# Patient Record
Sex: FEMALE | Race: BLACK OR AFRICAN AMERICAN
[De-identification: names, ages, dates, MRNs, and addresses within clinical notes are randomized per-mention and may not be internally consistent; named-entity substitution may affect disease eponyms.]

---

## 2014-12-24 VITALS — DIASTOLIC BLOOD PRESSURE: 91 MMHG | SYSTOLIC BLOOD PRESSURE: 185 MMHG

## 2017-04-14 ENCOUNTER — HOSPITAL ENCOUNTER (OUTPATIENT)
Dept: HOSPITAL 61 - PNCL | Age: 72
Discharge: HOME | End: 2017-04-14
Attending: ANESTHESIOLOGY
Payer: MEDICARE

## 2017-04-14 DIAGNOSIS — I25.10: ICD-10-CM

## 2017-04-14 DIAGNOSIS — M50.123: Primary | ICD-10-CM

## 2017-04-14 DIAGNOSIS — I10: ICD-10-CM

## 2017-04-14 DIAGNOSIS — M19.90: ICD-10-CM

## 2017-04-14 DIAGNOSIS — M96.1: ICD-10-CM

## 2017-04-14 PROCEDURE — 62321 NJX INTERLAMINAR CRV/THRC: CPT
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
## 2017-04-20 ENCOUNTER — HOSPITAL ENCOUNTER (EMERGENCY)
Dept: HOSPITAL 61 - ER | Age: 72
Discharge: HOME | End: 2017-04-20
Payer: MEDICARE

## 2017-04-20 VITALS — DIASTOLIC BLOOD PRESSURE: 90 MMHG | SYSTOLIC BLOOD PRESSURE: 145 MMHG

## 2017-04-20 DIAGNOSIS — R10.31: ICD-10-CM

## 2017-04-20 DIAGNOSIS — I10: ICD-10-CM

## 2017-04-20 DIAGNOSIS — G89.29: ICD-10-CM

## 2017-04-20 DIAGNOSIS — Z88.6: ICD-10-CM

## 2017-04-20 DIAGNOSIS — R10.9: Primary | ICD-10-CM

## 2017-04-20 DIAGNOSIS — Z88.5: ICD-10-CM

## 2017-04-20 DIAGNOSIS — Z88.8: ICD-10-CM

## 2017-04-20 DIAGNOSIS — Z90.49: ICD-10-CM

## 2017-04-20 LAB
ANION GAP SERPL CALC-SCNC: 13 MMOL/L (ref 6–14)
ANISOCYTOSIS BLD QL SMEAR: (no result)
BACTERIA #/AREA URNS HPF: 0 /HPF
BASOPHILS # BLD AUTO: 0.1 X10^3/UL (ref 0–0.2)
BASOPHILS NFR BLD: 1 % (ref 0–3)
BILIRUB UR QL STRIP: NEGATIVE
BUN SERPL-MCNC: 14 MG/DL (ref 7–20)
CALCIUM SERPL-MCNC: 9.2 MG/DL (ref 8.5–10.1)
CHLORIDE SERPL-SCNC: 104 MMOL/L (ref 98–107)
CO2 SERPL-SCNC: 26 MMOL/L (ref 21–32)
CREAT SERPL-MCNC: 1.2 MG/DL (ref 0.6–1)
EOSINOPHIL NFR BLD: 1 % (ref 0–3)
ERYTHROCYTE [DISTWIDTH] IN BLOOD BY AUTOMATED COUNT: 27.1 % (ref 11.5–14.5)
GFR SERPLBLD BASED ON 1.73 SQ M-ARVRAT: 53.9 ML/MIN
GLUCOSE SERPL-MCNC: 104 MG/DL (ref 70–99)
GLUCOSE UR STRIP-MCNC: NEGATIVE MG/DL
HCT VFR BLD CALC: 41.7 % (ref 36–47)
HGB BLD-MCNC: 13.2 G/DL (ref 12–15.5)
LYMPHOCYTES # BLD: 2.4 X10^3/UL (ref 1–4.8)
LYMPHOCYTES NFR BLD AUTO: 28 % (ref 24–48)
MCH RBC QN AUTO: 26 PG (ref 25–35)
MCHC RBC AUTO-ENTMCNC: 32 G/DL (ref 31–37)
MCV RBC AUTO: 83 FL (ref 79–100)
MONOCYTES NFR BLD: 9 % (ref 0–9)
NEUTROPHILS NFR BLD AUTO: 61 % (ref 31–73)
NITRITE UR QL STRIP: NEGATIVE
OVALOCYTES BLD QL SMEAR: (no result)
PH UR STRIP: 6 [PH]
PLATELET # BLD AUTO: 396 X10^3/UL (ref 140–400)
PLATELET # BLD EST: ADEQUATE 10*3/UL
POIKILOCYTOSIS BLD QL SMEAR: SLIGHT
POTASSIUM SERPL-SCNC: 4.1 MMOL/L (ref 3.5–5.1)
PROT UR STRIP-MCNC: NEGATIVE MG/DL
RBC # BLD AUTO: 5.05 X10^6/UL (ref 3.5–5.4)
RBC #/AREA URNS HPF: (no result) /HPF (ref 0–2)
SODIUM SERPL-SCNC: 143 MMOL/L (ref 136–145)
SP GR UR STRIP: 1.01
SQUAMOUS #/AREA URNS LPF: (no result) /LPF
TARGETS BLD QL SMEAR: PRESENT
UROBILINOGEN UR-MCNC: 0.2 MG/DL
WBC # BLD AUTO: 8.6 X10^3/UL (ref 4–11)
WBC #/AREA URNS HPF: 0 /HPF (ref 0–4)

## 2017-04-20 PROCEDURE — 93005 ELECTROCARDIOGRAM TRACING: CPT

## 2017-04-20 PROCEDURE — 80048 BASIC METABOLIC PNL TOTAL CA: CPT

## 2017-04-20 PROCEDURE — 85027 COMPLETE CBC AUTOMATED: CPT

## 2017-04-20 PROCEDURE — 96374 THER/PROPH/DIAG INJ IV PUSH: CPT

## 2017-04-20 PROCEDURE — 36415 COLL VENOUS BLD VENIPUNCTURE: CPT

## 2017-04-20 PROCEDURE — 85007 BL SMEAR W/DIFF WBC COUNT: CPT

## 2017-04-20 PROCEDURE — 99285 EMERGENCY DEPT VISIT HI MDM: CPT

## 2017-04-20 PROCEDURE — 81001 URINALYSIS AUTO W/SCOPE: CPT

## 2017-04-20 PROCEDURE — 84484 ASSAY OF TROPONIN QUANT: CPT

## 2017-04-20 PROCEDURE — 74176 CT ABD & PELVIS W/O CONTRAST: CPT

## 2017-04-20 NOTE — PHYS DOC
Past Medical History


Past Medical History:  Hypertension, Other


Additional Past Medical Histor:  chronic lower back pain


Past Surgical History:  Cholecystectomy, Other


Additional Past Surgical Histo:  right mastectomy; cervical neck; left 

bunionectomy,CARDIAC STENT


Alcohol Use:  None


Drug Use:  None





Adult General


Chief Complaint


Chief Complaint:  FLANK PAIN





[facility]


HPI


This is a 71-year-old female who is presenting with some right-sided abdominal 

pain that does radiate somewhat into her flank area as well. She denies any 

dysuria or hematuria. She states it developed rather suddenly several hours 

ago. She's never had symptoms like this before. 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 

laparatomy and still has her appendix and gallbladder. She denies any history 

of stones. She has one cardiac stent and hypertension. She rates her pain a 7/

10 on the pain scale. She denies any fever or chills. She denies any nausea or 

vomiting. She denies any chest pain or shortness of breath. States she's had 

normal bowel movements.





Review of Systems


Review of Systems





Constitutional: Denies fever or chills []


Eyes: Denies change in visual acuity, redness, or eye pain []


HENT: Denies nasal congestion or sore throat []


Respiratory: Denies cough or shortness of breath []


Cardiovascular: No additional information not addressed in HPI []


GI: Has abdominal pain, denies nausea, denies vomiting, denies bloody stools or 

diarrhea []


: Denies dysuria or hematuria []


Musculoskeletal: Denies back pain or joint pain []


Integument: Denies rash or skin lesions []


Neurologic: Denies headache, focal weakness or sensory changes []


Endocrine: Denies polyuria or polydipsia []





Current Medications


Current Medications








 Current Medications








 Medications


  (Trade)  Dose


 Ordered  [facility]  Start Time


 Stop Time Status Last Admin


Dose Admin


 


 Fentanyl Citrate


  (Fentanyl 2ml


 Vial)  50 mcg  1X  ONCE  4/20/17 21:45


 4/20/17 21:46 DC 4/20/17 22:02


50 MCG














Allergies


Allergies





 Allergies








Coded Allergies Type Severity Reaction Last Updated Verified


 


  aspirin Allergy Unknown  5/5/14 Yes


 


  codeine Allergy Unknown  5/5/14 Yes


 


  diazepam Allergy Unknown  5/5/14 Yes


 


  hydrocodone Allergy Unknown  5/5/14 Yes


 


  ibuprofen Allergy Unknown  5/5/14 Yes


 


  morphine Allergy Unknown  5/5/14 Yes


 


  promethazine Allergy Unknown  5/5/14 Yes


 


Uncoded Allergies Type Severity Reaction Last Updated Verified


 


  IM SHOTS Allergy Unknown  5/5/14 











Physical Exam


Physical Exam





Constitutional: Well developed, well nourished, no acute distress, non-toxic 

appearance. []


HENT: Normocephalic, atraumatic, bilateral external ears normal, oropharynx 

moist, no oral exudates, nose normal. []


Eyes: PERRLA, EOMI, conjunctiva normal, no discharge. [] 


Neck: Normal range of motion, no tenderness, supple, no stridor. [] 


Cardiovascular:Heart rate regular rhythm, no murmur []


Lungs & Thorax:  Bilateral breath sounds clear to auscultation []


Abdomen: Bowel sounds normal, soft, mild RLQ tenderness, no masses, no 

pulsatile masses. [] 


Skin: Warm, dry, no erythema, no rash. [] 


Back: No tenderness, right CVA tenderness. [] 


Extremities: No tenderness, no cyanosis, no clubbing, ROM intact, no edema. [] 


Neurologic: Alert and oriented X 3, normal motor function, normal sensory 

function, no focal deficits noted. []


Psychologic: Affect normal, judgement normal, mood normal. []





Current Patient Data


Vital Signs





 Vital Signs








  Date Time  Temp Pulse Resp B/P Pulse Ox O2 Delivery O2 Flow Rate FiO2


 


4/20/17 22:02   16   Nasal Cannula  


 


4/20/17 20:58 97.7 101  199/94 97   





 97.7       








Lab Values





 Laboratory Tests








Test


  4/20/17


21:18


 


White Blood Count


  8.6x10^3/uL


(4.0-11.0)


 


Red Blood Count


  5.05x10^6/uL


(3.50-5.40)


 


Hemoglobin


  13.2g/dL


(12.0-15.5)


 


Hematocrit


  41.7%


(36.0-47.0)


 


Mean Corpuscular Volume 83fL ()  


 


Mean Corpuscular Hemoglobin 26pg (25-35)  


 


Mean Corpuscular Hemoglobin


Concent 32g/dL (31-37)


 


 


Red Cell Distribution Width


  27.1%


(11.5-14.5)  H


 


Platelet Count


  396x10^3/uL


(140-400)


 


Neutrophils (%) (Auto) 61% (31-73)  


 


Lymphocytes (%) (Auto) 28% (24-48)  


 


Monocytes (%) (Auto) 9% (0-9)  


 


Eosinophils (%) (Auto) 1% (0-3)  


 


Basophils (%) (Auto) 1% (0-3)  


 


Neutrophils # (Auto)


  5.3x10^3uL


(1.8-7.7)


 


Lymphocytes # (Auto)


  2.4x10^3/uL


(1.0-4.8)


 


Monocytes # (Auto)


  0.8x10^3/uL


(0.0-1.1)


 


Eosinophils # (Auto)


  0.1x10^3/uL


(0.0-0.7)


 


Basophils # (Auto)


  0.1x10^3/uL


(0.0-0.2)


 


Platelet Estimate Pending  


 


Urine Collection Type Unknown  


 


Urine Color Yellow  


 


Urine Clarity Clear  


 


Urine pH 6.0  


 


Urine Specific Gravity 1.015  


 


Urine Protein


  Negativemg/dL


(NEG-TRACE)


 


Urine Glucose (UA)


  Negativemg/dL


(NEG)


 


Urine Ketones (Stick)


  Negativemg/dL


(NEG)


 


Urine Blood


  Negative (NEG)


 


 


Urine Nitrite


  Negative (NEG)


 


 


Urine Bilirubin


  Negative (NEG)


 


 


Urine Urobilinogen Dipstick


  0.2mg/dL (0.2


mg/dL)


 


Urine Leukocyte Esterase


  Negative (NEG)


 


 


Urine RBC Occ/HPF (0-2)  


 


Urine WBC 0/HPF (0-4)  


 


Urine Squamous Epithelial


Cells Occ/LPF  


 


 


Urine Bacteria 0/HPF (0-FEW)  


 


Urine Mucus Slight/LPF  


 


Sodium Level


  143mmol/L


(136-145)


 


Potassium Level


  4.1mmol/L


(3.5-5.1)


 


Chloride Level


  104mmol/L


()


 


Carbon Dioxide Level


  26mmol/L


(21-32)


 


Anion Gap 13 (6-14)  


 


Blood Urea Nitrogen


  14mg/dL (7-20)


 


 


Creatinine


  1.2mg/dL


(0.6-1.0)  H


 


Estimated GFR


(Cockcroft-Gault) 53.9  


 


 


Glucose Level


  104mg/dL


(70-99)  H


 


Calcium Level


  9.2mg/dL


(8.5-10.1)


 


Troponin I Quantitative


  < 0.017ng/mL


(0.000-0.055)





 Laboratory Tests


4/20/17 21:18








 Laboratory Tests


4/20/17 21:18














EKG


EKG


EKG as interpreted by me shows a sinus rhythm with a rate of 96 bpm. There are 

some lateral ST findings in V5 and V6 that are borderline but there is no STEMI 

criteria on this EKG. Intervals are normal.





Radiology/Procedures


Radiology/Procedures


IMPRESSION 


1. 2 mm left renal stone and possible punctate bilateral renal stones. 


There is no evidence of obstructive uropathy. 


2. 2.0 cm lesion within the right kidney, the stability of which compared 


to the prior studies favors a benign cyst. 


3. 2 mm and 3 mm right middle and lower lobe nodules, the larger of which 


is new compared to the prior study. Followup can be performed according to


Fleischner society criteria. 


4. Chronic mild compression deformities of T11 and L2, possibly pathologic


given lytic and sclerotic changes in these vertebral segments. There is 


also mixed lytic and sclerotic change within the sacrum. The long-term 


stability favors in etiology such as Paget's disease rather than 


metastatic disease. Correlate with clinical history.





Course & Med Decision Making


Course & Med Decision Making


Pertinent Labs and Imaging studies reviewed. (See chart for details)





This 71-year-old female has some right-sided lateral abdominal pain and right-

sided flank pain and will have full laboratory workup and a CT of her abdomen/

pelvis. At this time her CBC is unremarkable. Her urine does not demonstrate 

any signs of infection or blood. She does not appear to be in any acute 

distress.





My reassessment, the patient feels improved after pain medications. Her 

laboratory workup is unremarkable. There are no signs of infection. Her CT 

demonstrates some chronic findings in her T11 and L2 vertebrae that are likely 

lytic and sclerotic changes that are chronic. She also has some right middle 

and right lower lobe nodules which are new compared to a prior study. I 

indicated these findings to the patient and she will follow up with her primary 

care doctor. I see no indication at this time to admit the patient a hospital 

in light of her nonacute findings and normal laboratory workup. She will follow 

up closely with Dr. Harkins in the next 2-3 days and I will provide her pain 

medication until that time.





Dragon Disclaimer


Dragon Disclaimer


This electronic medical record was generated, in whole or in part, using a 

voice recognition dictation system.





Departure


Departure


Impression:  


 Primary Impression:  


 Right flank pain


Disposition:  01 HOME, SELF-CARE


Admitting Physician:  Other


Condition:  STABLE


Referrals:  


MIN HARKINS MD (PCP)


Patient Instructions:  Flank Pain, Easy-to-Read





Additional Instructions:


Please take your pain medication as prescribed. Return to the ER if you develop 

any worsening of your symptoms. Follow up with Dr. Harkins in the next 2-3 days 

for your lung findings and your flank pain.


Scripts


Oxycodone/Apap 5-325 (Percocet 5-325 Mg Tablet)1 Each Tablet1 Tab PO PRN Q6HRS 

PRN PAIN #14 TAB  Ref 0


   Prov:RAFAEL FUENTES DO         4/20/17








RAFAEL FUENTES DO Apr 20, 2017 21:46

## 2017-04-20 NOTE — RAD
PROCEDURE 

Abdomen and pelvis CT without intravenous contrast. 

 

HISTORY 

Right flank pain. 

 

TECHNIQUE 

Computed tomographic images the and pelvis were obtained without contrast.

One or more of the following individualized dose reduction techniques were

utilized for this examination: 1. Automated exposure control; 2. 

Adjustment of the mA and/or kV according to patient size; 3. Use of 

iterative reconstruction technique. 

 

COMPARISON 

None. 

 

FINDINGS 

Evaluation of the lower thorax demonstrates a 2 mm nodule within the right

middle lobe and 3 mm nodule within the right lower lobe. There is lower 

lobe atelectasis or scarring. There is a moderate hiatal hernia. There is 

coronary artery atherosclerosis. No hepatic lesion is seen. The 

gallbladder is surgically absent. The pancreas, spleen and adrenal glands 

are unremarkable. There is a 1-2 mm nonobstructing stone within the lower 

midzone of the left kidney. There may be punctate stones within the upper 

poles of both kidneys. There is no evidence of obstructive uropathy. There

is a 2.0 cm hypodense lesion within the anterior lower midzone of the 

right kidney, likely a cyst. No abnormally thickened or dilated loop of 

bowel is seen. There is no pathologically enlarged lymph node. The uterus 

and ovaries are unremarkable. There are chronic appearing mild compression

fractures of T11 and L2, both of which appear to be pathologic fractures 

associated with mixed lytic and sclerotic lesions. There are also mixed 

lytic and sclerotic changes involving the sacrum. 

 

IMPRESSION 

1. 2 mm left renal stone and possible punctate bilateral renal stones. 

There is no evidence of obstructive uropathy. 

2. 2.0 cm lesion within the right kidney, the stability of which compared 

to the prior studies favors a benign cyst. 

3. 2 mm and 3 mm right middle and lower lobe nodules, the larger of which 

is new compared to the prior study. Followup can be performed according to

Fleischner society criteria. 

4. Chronic mild compression deformities of T11 and L2, possibly pathologic

given lytic and sclerotic changes in these vertebral segments. There is 

also mixed lytic and sclerotic change within the sacrum. The long-term 

stability favors in etiology such as Paget's disease rather than 

metastatic disease. Correlate with clinical history. 

 

Electronically signed by: Jazmín Buck (Apr 20, 2017 22:01:27)

## 2017-04-21 NOTE — EKG
[facility]

               [address] [place] [address]

Test Date:    2017               Test Time:    21:14:34

Pat Name:     BRANDON GUERRERO            Department:   

Patient ID:   [id number]           Room:          

Gender:       F                        Technician:   

:          [id number]               Requested By: RAFAEL FUENTES

Order Number: [id number]            Reading MD:   Yuliet White

                                 Measurements

Intervals                              Axis          

Rate:         96                       P:            33

TX:           160                      QRS:          21

QRSD:         82                       T:            95

QT:           330                                    

QTc:          418                                    

                           Interpretive Statements

SINUS RHYTHM

LEFT ATRIAL ABNORMALITY

LVH WITH REPOLARIZATION ABNORMALITY

ABNORMAL ECG



Electronically Signed On 2017 17:12:03 CDT by Yuliet White

## 2017-04-24 ENCOUNTER — HOSPITAL ENCOUNTER (INPATIENT)
Dept: HOSPITAL 61 - ER | Age: 72
LOS: 2 days | Discharge: HOME | DRG: 552 | End: 2017-04-26
Attending: FAMILY MEDICINE | Admitting: FAMILY MEDICINE
Payer: MEDICARE

## 2017-04-24 VITALS — BODY MASS INDEX: 30.66 KG/M2 | WEIGHT: 184 LBS | HEIGHT: 65 IN

## 2017-04-24 VITALS — DIASTOLIC BLOOD PRESSURE: 95 MMHG | SYSTOLIC BLOOD PRESSURE: 172 MMHG

## 2017-04-24 DIAGNOSIS — Z95.5: ICD-10-CM

## 2017-04-24 DIAGNOSIS — Z85.3: ICD-10-CM

## 2017-04-24 DIAGNOSIS — N20.0: ICD-10-CM

## 2017-04-24 DIAGNOSIS — M47.26: ICD-10-CM

## 2017-04-24 DIAGNOSIS — M51.16: ICD-10-CM

## 2017-04-24 DIAGNOSIS — M77.9: ICD-10-CM

## 2017-04-24 DIAGNOSIS — M54.9: ICD-10-CM

## 2017-04-24 DIAGNOSIS — Z88.8: ICD-10-CM

## 2017-04-24 DIAGNOSIS — G89.29: ICD-10-CM

## 2017-04-24 DIAGNOSIS — Z88.5: ICD-10-CM

## 2017-04-24 DIAGNOSIS — M88.9: ICD-10-CM

## 2017-04-24 DIAGNOSIS — Z90.11: ICD-10-CM

## 2017-04-24 DIAGNOSIS — Z90.49: ICD-10-CM

## 2017-04-24 DIAGNOSIS — M48.06: ICD-10-CM

## 2017-04-24 DIAGNOSIS — Z88.6: ICD-10-CM

## 2017-04-24 DIAGNOSIS — M48.02: ICD-10-CM

## 2017-04-24 DIAGNOSIS — M43.16: Primary | ICD-10-CM

## 2017-04-24 DIAGNOSIS — I25.10: ICD-10-CM

## 2017-04-24 DIAGNOSIS — M17.0: ICD-10-CM

## 2017-04-24 DIAGNOSIS — I10: ICD-10-CM

## 2017-04-24 LAB
ALBUMIN SERPL-MCNC: 4.3 G/DL (ref 3.4–5)
ALBUMIN/GLOB SERPL: 1.2 {RATIO} (ref 1–1.7)
ALP SERPL-CCNC: 343 U/L (ref 46–116)
ALT SERPL-CCNC: 21 U/L (ref 14–59)
ANION GAP SERPL CALC-SCNC: 12 MMOL/L (ref 6–14)
ANISOCYTOSIS BLD QL SMEAR: (no result)
AST SERPL-CCNC: 15 U/L (ref 15–37)
BASOPHILS # BLD AUTO: 0 X10^3/UL (ref 0–0.2)
BASOPHILS NFR BLD: 1 % (ref 0–3)
BILIRUB SERPL-MCNC: 0.3 MG/DL (ref 0.2–1)
BUN SERPL-MCNC: 23 MG/DL (ref 7–20)
BUN/CREAT SERPL: 23 (ref 6–20)
CALCIUM SERPL-MCNC: 9.6 MG/DL (ref 8.5–10.1)
CHLORIDE SERPL-SCNC: 102 MMOL/L (ref 98–107)
CO2 SERPL-SCNC: 25 MMOL/L (ref 21–32)
CREAT SERPL-MCNC: 1 MG/DL (ref 0.6–1)
EOSINOPHIL NFR BLD: 1 % (ref 0–3)
ERYTHROCYTE [DISTWIDTH] IN BLOOD BY AUTOMATED COUNT: 26.3 % (ref 11.5–14.5)
GFR SERPLBLD BASED ON 1.73 SQ M-ARVRAT: 66.5 ML/MIN
GLOBULIN SER-MCNC: 3.7 G/DL (ref 2.2–3.8)
GLUCOSE SERPL-MCNC: 115 MG/DL (ref 70–99)
HCT VFR BLD CALC: 40.5 % (ref 36–47)
HGB BLD-MCNC: 13.1 G/DL (ref 12–15.5)
LYMPHOCYTES # BLD: 2.1 X10^3/UL (ref 1–4.8)
LYMPHOCYTES NFR BLD AUTO: 24 % (ref 24–48)
MCH RBC QN AUTO: 27 PG (ref 25–35)
MCHC RBC AUTO-ENTMCNC: 32 G/DL (ref 31–37)
MCV RBC AUTO: 82 FL (ref 79–100)
MONOCYTES NFR BLD: 6 % (ref 0–9)
NEUTROPHILS NFR BLD AUTO: 69 % (ref 31–73)
PLATELET # BLD AUTO: 367 X10^3/UL (ref 140–400)
PLATELET # BLD EST: ADEQUATE 10*3/UL
POIKILOCYTOSIS BLD QL SMEAR: SLIGHT
POTASSIUM SERPL-SCNC: 3.9 MMOL/L (ref 3.5–5.1)
PROT SERPL-MCNC: 8 G/DL (ref 6.4–8.2)
RBC # BLD AUTO: 4.93 X10^6/UL (ref 3.5–5.4)
SODIUM SERPL-SCNC: 139 MMOL/L (ref 136–145)
WBC # BLD AUTO: 8.7 X10^3/UL (ref 4–11)

## 2017-04-24 PROCEDURE — 85007 BL SMEAR W/DIFF WBC COUNT: CPT

## 2017-04-24 PROCEDURE — 36415 COLL VENOUS BLD VENIPUNCTURE: CPT

## 2017-04-24 PROCEDURE — 72141 MRI NECK SPINE W/O DYE: CPT

## 2017-04-24 PROCEDURE — 80053 COMPREHEN METABOLIC PANEL: CPT

## 2017-04-24 PROCEDURE — 96372 THER/PROPH/DIAG INJ SC/IM: CPT

## 2017-04-24 PROCEDURE — 85027 COMPLETE CBC AUTOMATED: CPT

## 2017-04-24 PROCEDURE — 80048 BASIC METABOLIC PNL TOTAL CA: CPT

## 2017-04-24 PROCEDURE — 72148 MRI LUMBAR SPINE W/O DYE: CPT

## 2017-04-24 PROCEDURE — 62323 NJX INTERLAMINAR LMBR/SAC: CPT

## 2017-04-24 PROCEDURE — 73723 MRI JOINT LWR EXTR W/O&W/DYE: CPT

## 2017-04-24 PROCEDURE — A9585 GADOBUTROL INJECTION: HCPCS

## 2017-04-24 RX ADMIN — HYDROMORPHONE HYDROCHLORIDE PRN MG: 2 INJECTION INTRAMUSCULAR; INTRAVENOUS; SUBCUTANEOUS at 22:38

## 2017-04-24 NOTE — ACF
Admission Forms Criteria


                                                                               

   


                             BACK PAIN





Clinical Indications for Admission to Inpatient Care





                                                            (Place 'X' for any 

and all applicable criteria):





Admission is indicated for ANY ONE of the following (1)(2)(3)(4)(5)(6):


[X]I.    Inpatient admission required rather than observation care (Also use 

Back Pain: Observation Care as appropriate) because 


         of ANY ONE of the following


         [ ]a)       Severe pain requiring acute inpatient management


         [ ]b)       Immediate inpatient surgery


         [X]c)      Other condition, treatment or monitoring requiring 

inpatient admission


[ ]II.    Spine fracture with significant damage or threat of damage to 

vertebral column or spinal cord 


[ ]III.   Progressive or severe neurologic deficit


[ ]IV.  Suspected spinal infection (e.g., epidural abscess, vertebral 

osteomyelitis)(10)


[ ]V.   Suspected cause requires inpatient treatment (eg, aortic dissection)


[ ]VI.  Cauda equina syndrome as indicated by ANY ONE of the following (9):


         [ ]a)    Bowel dysfunction                 


         [ ]b)    Bladder dysfunction 


         [ ]c)    Saddle anesthesia


         [ ]d)    Neurologic abnormality suggesting cauda equina impingement











Extended stay beyond goal length of stay may be needed for (3)(25):           


[ ]a)   Spinal cord compression from stenosis, disk, or tumor (8)(9) 


[ ]b)   Traumatic or pathologic vertebral fracture (33)


[ ]c)   Vertebral infection(10)


[ ]d)   Severe pain that is difficult to control   


[ ]e)   Older patients(65 years or older)











The original [employer] content created by [employer] has been revised. 


The portions of the content which have been revised are identified through the 

use of italic text or in bold, and [facility] 


has neither reviewed nor approved the modified material. All other unmodified 

content is copyright  [employer].





Please see references footnoted in the original [email or web address] edition 

2016





Admission Criteria Met?:  Yes








DEBBIE SPRING Apr 24, 2017 22:00

## 2017-04-24 NOTE — PHYS DOC
Past Medical History


Past Medical History:  Hypertension, Other


Additional Past Medical Histor:  chronic lower back pain


Past Surgical History:  Cholecystectomy, Other


Additional Past Surgical Histo:  right mastectomy; cervical neck; left 

bunionectomy,CARDIAC STENT


Alcohol Use:  None


Drug Use:  None





Adult General


Chief Complaint


Chief Complaint:  HIP PAIN





HPI


HPI


Patient is a 71  year old female presents emergency department stating that she 

has having right lower back pain down into her right hip. Patient states that 

she was seen here a few days ago for the same pain and discomfort. Plenty 

looking into the chart she was seen here for right flank pain with a kidney 

stone noted. She was discharged that time with Percocet. Patient states that 

she has been taken Flexeril and Percocet for the pain and discomfort with no 

relief. Patient denies any numbness or tingling down to the lower extremity. 

She states that she has called her primary care physician Friday and again 

today without any return call. She denies any new injury. She denies any loss 

of bowel or bladder. Patient at this time does not have any complaints of cough 

and shortness of breath or difficulty breathing.





Review of Systems


Review of Systems





Constitutional: Denies fever or chills []


Eyes: Denies change in visual acuity, redness, or eye pain []


HENT: Denies nasal congestion or sore throat []


Respiratory: Denies cough or shortness of breath []


Cardiovascular: No additional information not addressed in HPI []


GI: Denies abdominal pain, nausea, vomiting, bloody stools or diarrhea []


: Denies dysuria or hematuria []


Musculoskeletal: chronic low back pain denies joint pain []


Integument: Denies rash or skin lesions []


Neurologic: Denies headache, focal weakness or sensory changes []





Current Medications


Current Medications








 Current Medications








 Medications


  (Trade)  Dose


 Ordered  Sig/Mayte  Start Time


 Stop Time Status Last Admin


Dose Admin


 


 Fentanyl Citrate


  (Fentanyl 2ml


 Vial)  50 mcg  1X  ONCE  4/24/17 20:15


 4/24/17 20:16 DC 4/24/17 20:31


50 MCG


 


 Methylprednisolone


 Sodium Succinate


  (Solu-Medrol


 125mg Vial)  125 mg  1X  ONCE  4/24/17 20:15


 4/24/17 20:16 DC 4/24/17 20:31


125 MG














Allergies


Allergies





 Allergies








Coded Allergies Type Severity Reaction Last Updated Verified


 


  aspirin Allergy Unknown  5/5/14 Yes


 


  codeine Allergy Unknown  5/5/14 Yes


 


  diazepam Allergy Unknown  5/5/14 Yes


 


  hydrocodone Allergy Unknown  5/5/14 Yes


 


  ibuprofen Allergy Unknown  5/5/14 Yes


 


  morphine Allergy Unknown  5/5/14 Yes


 


  promethazine Allergy Unknown  5/5/14 Yes


 


Uncoded Allergies Type Severity Reaction Last Updated Verified


 


  IM SHOTS Allergy Unknown  5/5/14 











Physical Exam


Physical Exam





Constitutional: Well developed, well nourished, no acute distress, non-toxic 

appearance. []


HENT: Normocephalic, atraumatic, bilateral external ears normal, oropharynx 

moist, no oral exudates, nose normal. []


Eyes: PERRLA, EOMI, conjunctiva normal, no discharge. [] 


Neck: Normal range of motion, no tenderness, supple, no stridor. [] 


Cardiovascular:Heart rate regular rhythm, no murmur []


Lungs & Thorax:  Bilateral breath sounds clear to auscultation []


Skin: Warm, dry, no erythema, no rash. [] 


Back: right lower back tenderness


Extremities: No tenderness, no cyanosis, no clubbing, ROM intact, no edema. 

Peripheral pulses 2+ cap refill brisk less than 2 seconds. Patient with good 

sensation noted to the lower extremities.


Neurologic: Alert and oriented X 3, normal motor function, normal sensory 

function, no focal deficits noted. []


Psychologic: Affect normal, judgement normal, mood normal. []





Current Patient Data


Vital Signs





 Vital Signs








  Date Time  Temp Pulse Resp B/P Pulse Ox O2 Delivery O2 Flow Rate FiO2


 


4/24/17 20:31      Room Air  


 


4/24/17 19:18 98.1 92 24 214/100 100   





 98.1       











EKG


EKG


[]





Radiology/Procedures


Radiology/Procedures


[]





Course & Med Decision Making


Course & Med Decision Making


Pertinent Labs and Imaging studies reviewed. (See chart for details)


2115 spoke with Dr. Hastings in regards to patient's inability to get back pain 

under control. Patient had used Percocet and Flexeril at home. She was provided 

with 50 mics of fentanyl here in the emergency department as well as Solu-

Medrol with no relief. He didn't into the hospital. Patient did have a CT scan 

done on the 20th of abdomen and pelvis. Questionable Paget's disease of the 

lower back versus metastatic issues. I suspect further workup will need to be 

completed with admission. Patient agrees with admission at this time. Orders 

written.


[]





Dragon Disclaimer


Dragon Disclaimer


This electronic medical record was generated, in whole or in part, using a 

voice recognition dictation system.





Departure


Departure


Impression:  


 Primary Impression:  


 Back pain


Disposition:  09 ADMITTED AS INPATIENT


Admitting Physician:  KYLER Hastings


Condition:  STABLE


Referrals:  


MIN DUMONT MD (PCP)








TOMMIE LIZ Apr 24, 2017 20:11

## 2017-04-25 VITALS — DIASTOLIC BLOOD PRESSURE: 77 MMHG | SYSTOLIC BLOOD PRESSURE: 151 MMHG

## 2017-04-25 VITALS — SYSTOLIC BLOOD PRESSURE: 153 MMHG | DIASTOLIC BLOOD PRESSURE: 77 MMHG

## 2017-04-25 VITALS — DIASTOLIC BLOOD PRESSURE: 78 MMHG | SYSTOLIC BLOOD PRESSURE: 146 MMHG

## 2017-04-25 VITALS — DIASTOLIC BLOOD PRESSURE: 60 MMHG | SYSTOLIC BLOOD PRESSURE: 117 MMHG

## 2017-04-25 VITALS — SYSTOLIC BLOOD PRESSURE: 120 MMHG | DIASTOLIC BLOOD PRESSURE: 59 MMHG

## 2017-04-25 VITALS — SYSTOLIC BLOOD PRESSURE: 137 MMHG | DIASTOLIC BLOOD PRESSURE: 74 MMHG

## 2017-04-25 LAB
ANION GAP SERPL CALC-SCNC: 13 MMOL/L (ref 6–14)
ANISOCYTOSIS BLD QL SMEAR: (no result)
BASOPHILS # BLD AUTO: 0 X10^3/UL (ref 0–0.2)
BASOPHILS NFR BLD: 0 % (ref 0–3)
BUN SERPL-MCNC: 21 MG/DL (ref 7–20)
CALCIUM SERPL-MCNC: 9.5 MG/DL (ref 8.5–10.1)
CHLORIDE SERPL-SCNC: 101 MMOL/L (ref 98–107)
CO2 SERPL-SCNC: 23 MMOL/L (ref 21–32)
CREAT SERPL-MCNC: 1.1 MG/DL (ref 0.6–1)
EOSINOPHIL NFR BLD: 0 % (ref 0–3)
ERYTHROCYTE [DISTWIDTH] IN BLOOD BY AUTOMATED COUNT: 26.2 % (ref 11.5–14.5)
GFR SERPLBLD BASED ON 1.73 SQ M-ARVRAT: 59.6 ML/MIN
GLUCOSE SERPL-MCNC: 152 MG/DL (ref 70–99)
HCT VFR BLD CALC: 38.9 % (ref 36–47)
HGB BLD-MCNC: 12.3 G/DL (ref 12–15.5)
LYMPHOCYTES # BLD: 0.6 X10^3/UL (ref 1–4.8)
LYMPHOCYTES NFR BLD AUTO: 8 % (ref 24–48)
MCH RBC QN AUTO: 26 PG (ref 25–35)
MCHC RBC AUTO-ENTMCNC: 32 G/DL (ref 31–37)
MCV RBC AUTO: 83 FL (ref 79–100)
MONOCYTES NFR BLD: 1 % (ref 0–9)
NEUTROPHILS NFR BLD AUTO: 91 % (ref 31–73)
PLATELET # BLD AUTO: 352 X10^3/UL (ref 140–400)
PLATELET # BLD EST: ADEQUATE 10*3/UL
POTASSIUM SERPL-SCNC: 4.6 MMOL/L (ref 3.5–5.1)
RBC # BLD AUTO: 4.68 X10^6/UL (ref 3.5–5.4)
SODIUM SERPL-SCNC: 137 MMOL/L (ref 136–145)
WBC # BLD AUTO: 7.4 X10^3/UL (ref 4–11)

## 2017-04-25 RX ADMIN — OXYCODONE HYDROCHLORIDE AND ACETAMINOPHEN PRN TAB: 5; 325 TABLET ORAL at 12:22

## 2017-04-25 RX ADMIN — OXYCODONE HYDROCHLORIDE AND ACETAMINOPHEN PRN TAB: 5; 325 TABLET ORAL at 20:51

## 2017-04-25 RX ADMIN — PANTOPRAZOLE SODIUM SCH MG: 40 TABLET, DELAYED RELEASE ORAL at 08:32

## 2017-04-25 RX ADMIN — LISINOPRIL SCH MG: 10 TABLET ORAL at 08:31

## 2017-04-25 RX ADMIN — Medication SCH MG: at 08:31

## 2017-04-25 RX ADMIN — HYDROMORPHONE HYDROCHLORIDE PRN MG: 2 INJECTION INTRAMUSCULAR; INTRAVENOUS; SUBCUTANEOUS at 06:40

## 2017-04-25 RX ADMIN — HYDROMORPHONE HYDROCHLORIDE PRN MG: 2 INJECTION INTRAMUSCULAR; INTRAVENOUS; SUBCUTANEOUS at 04:11

## 2017-04-25 RX ADMIN — TRAMADOL HYDROCHLORIDE PRN MG: 50 TABLET, COATED ORAL at 08:32

## 2017-04-25 RX ADMIN — TRAMADOL HYDROCHLORIDE PRN MG: 50 TABLET, COATED ORAL at 17:31

## 2017-04-25 RX ADMIN — ASPIRIN SCH MG: 81 TABLET, COATED ORAL at 08:31

## 2017-04-25 RX ADMIN — HYDROMORPHONE HYDROCHLORIDE PRN MG: 2 INJECTION INTRAMUSCULAR; INTRAVENOUS; SUBCUTANEOUS at 01:57

## 2017-04-25 NOTE — RAD
PROCEDURE 

Lumbar spine MRI without contrast. 

 

HISTORY 

Pain. 

 

TECHNIQUE 

Multiplanar and multi sequence magnetic resonance imaging of the lumbar 

spine was performed without contrast. 

 

COMPARISON 

None. 

 

FINDINGS 

There is grade 1 anterolisthesis of L4 on L5, measuring 5 mm. There is 

minimal grade 1 anterolisthesis of L5 on S1, measuring 2 mm. There is 

grade 1 anterolisthesis of L1 on L2 measuring 4 mm, a component of which 

is due to a mild chronic L2 compression fracture with slight retropulsion 

of the L2 cortex into the central canal. There is heterogeneous signal 

within T11 with associated mild decreased T1 vertebral body height, also 

likely due to a chronic fracture. There is also diffusely heterogeneous 

signal throughout the remainder of the vertebral and sacral marrow, likely

due to fatty marrow placement. The conus terminates at L1. There is a 

filum lipoma, measuring 2 mm in caliber. There is no evidence of a 

tethered cord. There are small simple appearing renal cysts. There is 

suspected slight congenital narrowing of the central canal at the lumbar 

levels. There is slight epidural lipomatosis. 

 

At L1-L2, there is a disc bulge. There is mild facet arthropathy. There is

slight hypertrophy of the ligamentum flavum. There is mild left foraminal 

stenosis. 

At L2-L3, there is a disc bulge and endplate remodeling. There is mild 

facet arthropathy. There is hypertrophy of the ligamentum flavum. There is

mild left greater than right foraminal stenosis. There is minimal central 

canal stenosis. 

At L3-L4, there is a disc bulge and endplate remodeling. There is mild 

facet arthropathy. There is hypertrophy of the ligamentum flavum. There is

no stenosis. 

At L4-L5, there is a disc bulge and endplate remodeling. There is severe 

facet arthropathy. There is hypertrophy of the ligamentum flavum. There is

grade 1 anterolisthesis. There is mild bilateral foraminal stenosis. There

is severe central canal stenosis. 

At L5-S1, there is a diffuse disc bulge with posterior lateral endplate 

osteophytosis. There is mild facet arthropathy. There is mild to moderate 

bilateral foraminal stenosis. There is mild central canal stenosis. 

 

IMPRESSION 

1. Multilevel degenerative change throughout the lumbar spine, resulting 

in stenosis as described in detail above. Findings are most significant at

L4-L5, resulting in severe central canal stenosis. These findings are 

similar compared to the prior study. 

2. Stable mild chronic compression deformities of T11 and L2. There is 

heterogeneous marrow signal at these levels which may be due to 

heterogeneous marrow replacement, Paget's disease or pathologic fractures.

The stability compared to the prior study favors benignity. 

3. Suspected slight congenital narrowing of the central canal and mild 

epidural lipomatosis. 

4. Small filum lipoma. 

5. Grade 1 anterolisthesis of L4 on L5. 

 

Electronically signed by: Jazmín Buck (Apr 25, 2017 12:34:02)

## 2017-04-25 NOTE — PDOC
SUBJECTIVE


Subjective


Pt seen examined. Full consult to follow. Exam suggests potential right hip 

pathology. Will check MRI right hip.





OBJECTIVE


Vital Signs





Vital Signs








  Date Time  Temp Pulse Resp B/P Pulse Ox O2 Delivery O2 Flow Rate FiO2


 


4/25/17 12:22   18  99 Room Air  


 


4/25/17 11:00 98.0 87 16 151/77 99 Room Air  





 98.0       


 


4/25/17 09:32   19  97 Room Air  


 


4/25/17 08:32   18  97 Room Air  


 


4/25/17 08:32  73  146/78    


 


4/25/17 08:31  73  146/78    


 


4/25/17 08:00      Room Air  


 


4/25/17 07:10   18  97 Room Air  


 


4/25/17 07:00 97.7 73 18 146/78 97 Room Air  





 97.7       


 


4/25/17 06:40     98 Room Air  


 


4/25/17 04:11   18  98 Room Air  


 


4/25/17 03:10 97.7 72 20 153/77 98 Room Air  





 97.7       


 


4/25/17 01:57     96 Room Air  


 


4/24/17 23:08 97.7 78 16 172/95 96 Room Air  





 97.7       


 


4/24/17 22:38   18  97   


 


4/24/17 22:30      Room Air  


 


4/24/17 21:55  89 18 169/81 97 Room Air  


 


4/24/17 20:31      Room Air  


 


4/24/17 19:18 98.1 92 24 214/100 100 Room Air  





 98.1       








I & O











 Intake and Output 


 


 4/25/17





 06:59


 


Intake Total 600 ml


 


Balance 600 ml


 


 


 


Intake Oral 600 ml


 


# Voids 3











COMMENT


Lab





Laboratory Tests








Test


  4/24/17


21:50 4/25/17


04:27


 


White Blood Count


  8.7x10^3/uL


(4.0-11.0) 7.4x10^3/uL


(4.0-11.0)


 


Red Blood Count


  4.93x10^6/uL


(3.50-5.40) 4.68x10^6/uL


(3.50-5.40)


 


Hemoglobin


  13.1g/dL


(12.0-15.5) 12.3g/dL


(12.0-15.5)


 


Hematocrit


  40.5%


(36.0-47.0) 38.9%


(36.0-47.0)


 


Mean Corpuscular Volume 82fL ()  83fL () 


 


Mean Corpuscular Hemoglobin 27pg (25-35)  26pg (25-35) 


 


Mean Corpuscular Hemoglobin


Concent 32g/dL (31-37) 


  32g/dL (31-37) 


 


 


Red Cell Distribution Width


  26.3%


(11.5-14.5) 26.2%


(11.5-14.5)


 


Platelet Count


  367x10^3/uL


(140-400) 352x10^3/uL


(140-400)


 


Neutrophils (%) (Auto) 69% (31-73)  91% (31-73) 


 


Lymphocytes (%) (Auto) 24% (24-48)  8% (24-48) 


 


Monocytes (%) (Auto) 6% (0-9)  1% (0-9) 


 


Eosinophils (%) (Auto) 1% (0-3)  0% (0-3) 


 


Basophils (%) (Auto) 1% (0-3)  0% (0-3) 


 


Neutrophils # (Auto)


  6.0x10^3uL


(1.8-7.7) 6.7x10^3uL


(1.8-7.7)


 


Lymphocytes # (Auto)


  2.1x10^3/uL


(1.0-4.8) 0.6x10^3/uL


(1.0-4.8)


 


Monocytes # (Auto)


  0.5x10^3/uL


(0.0-1.1) 0.1x10^3/uL


(0.0-1.1)


 


Eosinophils # (Auto)


  0.1x10^3/uL


(0.0-0.7) 0.0x10^3/uL


(0.0-0.7)


 


Basophils # (Auto)


  0.0x10^3/uL


(0.0-0.2) 0.0x10^3/uL


(0.0-0.2)


 


Platelet Estimate


  Adequate


(ADEQUATE) Adequate


(ADEQUATE)


 


Poikilocytosis Slight  


 


Anisocytosis Mod  Marked 


 


Sodium Level


  139mmol/L


(136-145) 137mmol/L


(136-145)


 


Potassium Level


  3.9mmol/L


(3.5-5.1) 4.6mmol/L


(3.5-5.1)


 


Chloride Level


  102mmol/L


() 101mmol/L


()


 


Carbon Dioxide Level


  25mmol/L


(21-32) 23mmol/L


(21-32)


 


Anion Gap 12 (6-14)  13 (6-14) 


 


Blood Urea Nitrogen 23mg/dL (7-20)  21mg/dL (7-20) 


 


Creatinine


  1.0mg/dL


(0.6-1.0) 1.1mg/dL


(0.6-1.0)


 


Estimated GFR


(Cockcroft-Gault) 66.5 


  59.6 


 


 


BUN/Creatinine Ratio 23 (6-20)  


 


Glucose Level


  115mg/dL


(70-99) 152mg/dL


(70-99)


 


Calcium Level


  9.6mg/dL


(8.5-10.1) 9.5mg/dL


(8.5-10.1)


 


Total Bilirubin


  0.3mg/dL


(0.2-1.0) 


 


 


Aspartate Amino Transf


(AST/SGOT) 15U/L (15-37) 


  


 


 


Alanine Aminotransferase


(ALT/SGPT) 21U/L (14-59) 


  


 


 


Alkaline Phosphatase


  343U/L


() 


 


 


Total Protein


  8.0g/dL


(6.4-8.2) 


 


 


Albumin


  4.3g/dL


(3.4-5.0) 


 


 


Albumin/Globulin Ratio 1.2 (1.0-1.7)  


 


Segmented Neutrophils %  85% (35-66) 


 


Lymphocytes %  11% (24-48) 


 


Monocytes %  4% (0-10) 














CM HOLLINS MD Apr 25, 2017 14:11

## 2017-04-25 NOTE — RAD
PROCEDURE 

MRI study of the right hip with and without contrast 

 

HISTORY 

Right hip pain for 2 months. No known injury. 

 

TECHNIQUE 

Pre and post contrast enhanced MRI sequences of the right hip were 

performed. A total of 8 milliliters of Gadavist was given intravenously. 

 

COMPARISON 

None available. 

 

FINDINGS 

No bone marrow edema or fracture or marrow infiltrative process or 

avascular necrosis is seen. No hip joint effusion is seen. No abnormal 

enhancement of the hip joint is seen and therefore no synovitis is seen. 

The acetabular labrum is intact. No paralabral ganglion cyst is seen. The 

conjoined hamstring tendon is intact and no ischial tuberosity bursitis is

seen. The iliopsoas tendon is intact and no iliopsoas bursitis is seen. 

Mild tendinosis of the gluteus maria l tendon is seen at the level of the 

greater trochanter. No greater trochanteric bursitis is seen. No muscle 

edema is seen. No soft tissue mass or fluid collection or abscess is 

evident. No abnormal soft tissue enhancement is seen. 

 

IMPRESSION 

Mild tendinosis of the gluteus maria l tendon. Otherwise, unremarkable MRI

study of the right hip. 

 

Electronically signed by: Siddhartha Perry MD (Apr 25, 2017 16:59:38)

## 2017-04-25 NOTE — PDOC
Provider Note


Provider Note


dictated, back pain, acute decompensation, better now but was to have MRI and 

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








MARCO CHERY MD Apr 25, 2017 08:41

## 2017-04-26 VITALS — DIASTOLIC BLOOD PRESSURE: 68 MMHG | SYSTOLIC BLOOD PRESSURE: 155 MMHG

## 2017-04-26 VITALS — SYSTOLIC BLOOD PRESSURE: 121 MMHG | DIASTOLIC BLOOD PRESSURE: 68 MMHG

## 2017-04-26 VITALS — DIASTOLIC BLOOD PRESSURE: 73 MMHG | SYSTOLIC BLOOD PRESSURE: 123 MMHG

## 2017-04-26 RX ADMIN — TRAMADOL HYDROCHLORIDE PRN MG: 50 TABLET, COATED ORAL at 01:11

## 2017-04-26 RX ADMIN — OXYCODONE HYDROCHLORIDE AND ACETAMINOPHEN PRN TAB: 5; 325 TABLET ORAL at 11:23

## 2017-04-26 RX ADMIN — ASPIRIN SCH MG: 81 TABLET, COATED ORAL at 07:47

## 2017-04-26 RX ADMIN — PANTOPRAZOLE SODIUM SCH MG: 40 TABLET, DELAYED RELEASE ORAL at 07:46

## 2017-04-26 RX ADMIN — TRAMADOL HYDROCHLORIDE PRN MG: 50 TABLET, COATED ORAL at 07:47

## 2017-04-26 RX ADMIN — Medication SCH MG: at 07:47

## 2017-04-26 RX ADMIN — LISINOPRIL SCH MG: 10 TABLET ORAL at 07:46

## 2017-04-26 NOTE — PDOC
SUBJECTIVE


Subjective


Denies acute complaints at this time. Hip and back pain improved. Had injection 

with pain management today.





OBJECTIVE


Objective


MRI hip essentially unremarkable


Vital Signs





Vital Signs








  Date Time  Temp Pulse Resp B/P Pulse Ox O2 Delivery O2 Flow Rate FiO2


 


4/26/17 11:23   19  100 Room Air  


 


4/26/17 11:00 98.0 81 16 155/68 100 Room Air  





 98.0       


 


4/26/17 07:50      Room Air  


 


4/26/17 07:47   18  99 Room Air  


 


4/26/17 07:47  80  123/73    


 


4/26/17 07:46  80  123/73    


 


4/26/17 07:00 97.9 80 16 123/73 97 Room Air  





 97.9       


 


4/26/17 03:00 98.4 73 18 121/68 99 Room Air  





 98.4       


 


4/26/17 02:11     100 Room Air  


 


4/26/17 01:11     100 Room Air  


 


4/25/17 23:26 98.2 79 18 117/60 100 Room Air  





 98.2       


 


4/25/17 21:52   17  98 Room Air  


 


4/25/17 20:51   17     


 


4/25/17 20:00      Room Air  


 


4/25/17 19:25  105 18 137/74 98 Room Air  


 


4/25/17 17:31   18  96 Room Air  


 


4/25/17 15:00 98.5 91 16 120/59 96 Room Air  





 98.5       








I & O











 Intake and Output 


 


 4/26/17





 07:00


 


Intake Total 1220 ml


 


Output Total 1000 ml


 


Balance 220 ml


 


 


 


Intake Oral 1220 ml


 


Output Urine Total 1000 ml


 


# Voids 2











PHYSICAL EXAM


Physical Exam


AA, NAD, KIMBALL stable, sensation intact LT





ASSESSMENT/PLAN


Assessment/Plan


71F with low back and right hip pain with lumbar stenosis, spondylolisthesis, 

spondylosis


-reports significant improvement today


-continue therapy and non-surgical management at present time if remains 

neurologically stable


-monitor for changes otherwise


Problems:  








CM HOLLINS MD Apr 26, 2017 13:30

## 2017-04-26 NOTE — PDOC3
Discharge Summary


Visit Information


Date of Admission:  Apr 24, 2017


Date of Discharge:  Apr 26, 2017


Final Diagnosis


 Problems


Medical Problems:


(1) Back pain


Status: Acute  





(2) Intractable back pain


Status: Acute  





(3) Spinal stenosis in cervical region


Status: Acute  





(4) Spinal stenosis of lumbar region


Status: Acute  











Brief Hospital Course


Allergies





 Allergies








Coded Allergies Type Severity Reaction Last Updated Verified


 


  aspirin Allergy Intermediate  4/25/17 Yes


 


  codeine Allergy Intermediate  4/25/17 Yes


 


  diazepam Allergy Intermediate  4/25/17 Yes


 


  hydrocodone Allergy Intermediate  4/25/17 Yes


 


  ibuprofen Allergy Intermediate  4/25/17 Yes


 


  morphine Allergy Intermediate  4/25/17 Yes


 


  promethazine Allergy Intermediate  4/25/17 Yes


 


Uncoded Allergies Type Severity Reaction Last Updated Verified


 


  IM SHOTS Allergy Unknown  5/5/14 








Vital Signs





 Vital Signs








  Date Time  Temp Pulse Resp B/P Pulse Ox O2 Delivery O2 Flow Rate FiO2


 


4/26/17 12:23   18  100 Room Air  


 


4/26/17 11:00 98.0 81  155/68    





 98.0       








Lab Results





Laboratory Tests








Test


  4/24/17


21:50 4/25/17


04:27


 


White Blood Count


  8.7x10^3/uL


(4.0-11.0) 7.4x10^3/uL


(4.0-11.0)


 


Red Blood Count


  4.93x10^6/uL


(3.50-5.40) 4.68x10^6/uL


(3.50-5.40)


 


Hemoglobin


  13.1g/dL


(12.0-15.5) 12.3g/dL


(12.0-15.5)


 


Hematocrit


  40.5%


(36.0-47.0) 38.9%


(36.0-47.0)


 


Mean Corpuscular Volume 82fL ()  83fL () 


 


Mean Corpuscular Hemoglobin 27pg (25-35)  26pg (25-35) 


 


Mean Corpuscular Hemoglobin


Concent 32g/dL (31-37) 


  32g/dL (31-37) 


 


 


Red Cell Distribution Width


  26.3%


(11.5-14.5) 26.2%


(11.5-14.5)


 


Platelet Count


  367x10^3/uL


(140-400) 352x10^3/uL


(140-400)


 


Neutrophils (%) (Auto) 69% (31-73)  91% (31-73) 


 


Lymphocytes (%) (Auto) 24% (24-48)  8% (24-48) 


 


Monocytes (%) (Auto) 6% (0-9)  1% (0-9) 


 


Eosinophils (%) (Auto) 1% (0-3)  0% (0-3) 


 


Basophils (%) (Auto) 1% (0-3)  0% (0-3) 


 


Neutrophils # (Auto)


  6.0x10^3uL


(1.8-7.7) 6.7x10^3uL


(1.8-7.7)


 


Lymphocytes # (Auto)


  2.1x10^3/uL


(1.0-4.8) 0.6x10^3/uL


(1.0-4.8)


 


Monocytes # (Auto)


  0.5x10^3/uL


(0.0-1.1) 0.1x10^3/uL


(0.0-1.1)


 


Eosinophils # (Auto)


  0.1x10^3/uL


(0.0-0.7) 0.0x10^3/uL


(0.0-0.7)


 


Basophils # (Auto)


  0.0x10^3/uL


(0.0-0.2) 0.0x10^3/uL


(0.0-0.2)


 


Platelet Estimate


  Adequate


(ADEQUATE) Adequate


(ADEQUATE)


 


Poikilocytosis Slight  


 


Anisocytosis Mod  Marked 


 


Sodium Level


  139mmol/L


(136-145) 137mmol/L


(136-145)


 


Potassium Level


  3.9mmol/L


(3.5-5.1) 4.6mmol/L


(3.5-5.1)


 


Chloride Level


  102mmol/L


() 101mmol/L


()


 


Carbon Dioxide Level


  25mmol/L


(21-32) 23mmol/L


(21-32)


 


Anion Gap 12 (6-14)  13 (6-14) 


 


Blood Urea Nitrogen 23mg/dL (7-20)  21mg/dL (7-20) 


 


Creatinine


  1.0mg/dL


(0.6-1.0) 1.1mg/dL


(0.6-1.0)


 


Estimated GFR


(Cockcroft-Gault) 66.5 


  59.6 


 


 


BUN/Creatinine Ratio 23 (6-20)  


 


Glucose Level


  115mg/dL


(70-99) 152mg/dL


(70-99)


 


Calcium Level


  9.6mg/dL


(8.5-10.1) 9.5mg/dL


(8.5-10.1)


 


Total Bilirubin


  0.3mg/dL


(0.2-1.0) 


 


 


Aspartate Amino Transf


(AST/SGOT) 15U/L (15-37) 


  


 


 


Alanine Aminotransferase


(ALT/SGPT) 21U/L (14-59) 


  


 


 


Alkaline Phosphatase


  343U/L


() 


 


 


Total Protein


  8.0g/dL


(6.4-8.2) 


 


 


Albumin


  4.3g/dL


(3.4-5.0) 


 


 


Albumin/Globulin Ratio 1.2 (1.0-1.7)  


 


Segmented Neutrophils %  85% (35-66) 


 


Lymphocytes %  11% (24-48) 


 


Monocytes %  4% (0-10) 








Brief Hospital Course


Ms. Can  is a 71 old who presented with intractable back pain and admitted 

for pain control and further eval, she had an MRI of L-spine, C-spine and hip 

and consults with Neurosurgery, PM&R and anesthesia pain clinic and had an 

epidural steroid and is feeling much better and now capablwe of managing as an 

outpatient and wanting to be discharged.  She will not require surgery at this 

time and she has f/u arranged.  She had no medical problems while here and will 

continue her routine meds





Discharge Information


Condition at Discharge:  Improved, Stable


Follow Up:  Weeks (1-2)


Disposition/Orders:  D/C to Home


Scheduled


Amlodipine Besylate (Amlodipine Besylate) 5 MG PO DAILY (Reported) 


Aspirin (Aspir 81) 1 TAB PO DAILY (Reported) 


Ferrous Sulfate (Ferrous Sulfate) 1 TAB PO DAILY (Reported) 


Lisinopril (Lisinopril) 1 TAB PO DAILY (Reported) 


Oxycodone/Apap 5-325 (Percocet 5-325 Mg Tablet) 1-2 TAB PO Q4-6HRS (Reported) 


Pantoprazole Sodium (Pantoprazole Sodium) 40 MG PO DAILY (Reported) 





Scheduled PRN


Oxycodone/Apap 5-325 (Percocet 5-325 Mg Tablet) 1 TAB PO PRN Q6HRS PRN PRN PAIN 


Tramadol Hcl (Tramadol Hcl) 50 MG PO Q6H PRN PRN PAIN (Reported) 





Miscellaneous Medications


([Flexeril]) (Reported) 








MARCO CHERY MD Apr 26, 2017 14:20

## 2017-04-27 NOTE — CONS
DATE OF CONSULTATION:  



LOCATION:  She is in room 670.



ATTENDING PHYSICIAN:  Dr. Hastings.



The patient was seen at the request of Dr. Hastings for rehab evaluation.



HISTORY OF PRESENT ILLNESS:  This is a 71-year-old female admitted through the

Emergency Room on 04/25/2017 with intractable lower back pain with radiation to

her right hip with cramps in both lower extremities.  The patient has been using

Percocet, Flexeril and tramadol without much help.  She had history of Paget's

disease.  She had MRI scan of her lumbar vertebrae done which revealed

multilevel degenerative disk disease and degenerative joint disease with severe

central canal spinal stenosis at L4-L5 and stable mild chronic compression

fractures of T11 and L2 and grade 1 anterolisthesis of L4 and L5 and DrTish ____

problem at the hip and he asked for an MRI scan of her right hip, which revealed

mild tendinosis at the gluteus maria l tendon at the level of the greater

trochanter, no trochanteric bursitis was seen.  The patient lives with her

 and son in the [place] [place] [facility], had three steps to enter the

house with railing plus washer and dryer are in the basement.



PAST MEDICAL HISTORY:  Significant for hypertension, chronic lower back pain,

carcinoma of breast, coronary artery disease status post stenting, left

bunionectomy, surgery on her cervical neck, right mastectomy and

cholecystectomy.



ALLERGIES:  SHE IS KNOWN ALLERGIC TO ASPIRIN, CODEINE, DIAZEPAM, HYDROCODONE,

IBUPROFEN, MORPHINE AND PROMETHAZINE.  MOST OF THESE APPEAR TO BE INTOLERANCES

RATHER THAN TRUE ALLERGIES.



The patient also admits some neck pain with radiation to her right upper

extremity with tingling and numbness especially in the early morning.  The

patient also drops things out of her right hand.  The patient denies any recent

injury or fall.  The patient admits she has some difficulty to empty the bladder

on occasion.  Denies any dysuria.



PHYSICAL EXAMINATION:  Today revealed an elderly female.  She is alert, oriented

to time, place, person and circumstance and follows commands appropriately. 

Moves all 4 extremities voluntarily where she had 4+/5 grade muscle strength and

deep tendon reflexes are 2+ and symmetrical and maybe slightly exaggerated at

both knees with absent right ankle jerk.  She had equal perception of touch and

pinprick sensation bilaterally except over palmar aspect of right hand where she

has slightly decreased touch and pinprick sensation when compared to left side. 

Negative Tinel sign over median nerve at the wrist and ulnar nerve at the wrist

and elbow and negative Phalen sign at both wrists.  The patient has tenderness

to palpation over right cervical paraspinal and upper trapezius muscle and also

over right lumbar paraspinal muscles, sacroiliac joint area bilaterally and over

right gluteal muscles.  No significant tenderness to palpation over trochanteric

bursa area.  She also had tenderness to palpation over medial aspect of both

knees.  She had pain free range of motion of both hip joint.  Crepitus on range

of motion of both knee joints.  She is independent with rolling from side to

side.  Complains of pain.  She just had a lumbar epidural steroid injection done

by Dr. Anselmo Castellon, so I did not get her up and see how she is getting up and

walking around.  Other than epidural injection site her skin is intact.  She had

a Band-Aid to the epidural injection site in her lower back.  Straight leg

raising test is negative bilaterally.



ASSESSMENT:  An elderly female with chronic lower back pain with radiological

evidence of degenerative disk disease and degenerative joint disease of lumbar

vertebrae with some degree of lumbar spinal stenosis at L4-L5 with lumbar

radiculitis, also degenerative joint disease of both knees with some tendinitis

and patient is status post previous cervical spine surgery with neck pain with

right cervical radiculitis.  To also rule out associated compression neuropathy

involving right median nerve at the wrist.



RECOMMENDATIONS:  To try her with a lumbar corset, to get her up with physical

therapy.  She might need a roller walker to take home.  I have reviewed with her

proper body mechanics, to consider painful sacroiliac joint injection if her

pain persists in the next day or so.  I did not do any injections today as she

just had lumbar epidural steroid injections.



Dr. Hastings, I appreciate asking me to participate in the care of this interesting

patient.  I will be glad to follow her with you as needed for her

rehabilitation.

 



______________________________

TITO LAGUERRE MD



DR:  ROBERTA/dedrick  JOB#:  [id number] / [id number]

DD:  04/26/2017 10:37  DT:  04/27/2017 01:39

## 2017-04-27 NOTE — PAIN
DATE OF SERVICE:  



DIAGNOSES:

1.  Cervical radiculopathy with cervical degenerative disk disease.

2.  Lumbar radiculopathy with lumbar degenerative disk disease, lumbar spinal

stenosis.



HISTORY OF PRESENT ILLNESS:  This is a 71-year-old female who presents as an

inpatient, seen about 12 days ago for her cervical epidural steroid injection,

doing very well, about 80% improvement with the cervical injection, reports now

that she is having some significant pain in her low back and right hip and lower

extremity.  The patient reports this started about 3 days ago.  It has been

there for about 6 months, but much worse over the last 3 days.  She presented to

the Hospital Emergency Department because of the pain and was subsequently

admitted for further workup.  The patient reports it is hard to weightbear as

she is having pain into the posterior gluteus, lateral thigh, anterior thigh,

medial thigh into the medial knee and lower leg at times posteriorly as well, on

the right side only.  The patient reports no significant pain on the left side,

is beginning to decrease her ability to ambulate, so she went to the hospital

and was admitted.  The patient did have an MRI scan yesterday showing severe

central stenosis at the L4-L5 level with degenerative disk disease throughout

the lumbar spine without any acute findings, also had MRI scan of the right hip

showing unremarkable MRI study of the right hip with some mild tendinosis of the

gluteus maria l tendon on that side.  The patient reports otherwise no new motor

or sensory deficits or other complaints.  She has significant pain, becoming

more difficult to weightbear on the right side with her low back.



PHYSICAL EXAMINATION:

VITAL SIGNS:  The patient's blood culture shows 137/68, pulse 72, respirations

are 18, and temperature is 98.0 degrees Fahrenheit.  Height is 5 feet 5 inches.

GENERAL:  The patient is awake, alert, oriented, appropriate, very pleasant

demeanor.

HEENT:  Head shows normocephalic and atraumatic.  Extraocular movements are

intact and symmetrical.  Oral cavity shows mucous membranes moist and pink. 

Dentition is intact.

NECK:  Shows anterior throat supple without palpable lymphadenopathy noted. 

Swallow reflex is symmetrical.

CHEST:  Shows normal on inspection.  Breath sounds are clear to auscultation

bilaterally.

HEART:  Shows S1 and S2 clear.

ABDOMEN:  Soft, nontender, and nondistended.  No palpable organomegaly is noted.

 No rebound or guarding demonstrated.

BACK:  Shows spine grossly midline.  The patient's neck shows good rotational

motion of cervical spine, both laterally as well as extension and flexion

without difficulty.  Normal appearing thoracic kyphosis and lumbar lordotic

curvature.  No previous bruises, lesions or rashes are noted.  The patient's

lumbar paraspinous musculature is symmetrical on inspection with palpation,

shows some moderate tenderness bilaterally, but without significant radiation. 

The patient has good rotation and motion of lumbar spine, both laterally greater

than 10 degrees right and left as well as extension greater than 10 degrees,

forward flexion of 45 degrees without difficulty.

EXTREMITIES:  The patient's lower extremities showed deep tendon reflexes at 1+

in the biceps and the patellar and tendo calcaneus tendons.  Motor exam is

approximately 4 on a scale of 5 with right dorsiflexion, extension, quadriceps

and hamstring flexion 5/5 on the left.



Options were discussed with the patient.  The patient's old chart was reviewed

and her current medication regimen was reviewed well and her review of systems

updated today.  We will proceed with a lumbar epidural steroid injection with

fluoroscopic guidance.  Risks were again discussed including, but not limited to

bleeding, infection, possibility of epidural hematoma, subsequent neurologic

compromise, dural puncture, headaches, spinal cord and/or nerve damage, side

effects of steroid medication and poor results regarding pain control.  The

patient understands and wishes to proceed.  The patient will return to clinic in

approximately 2 weeks for followup, was counseled on return appointment,

activity level and side effects to be aware of.



DIAGNOSES:  Lumbar radiculopathy with lumbar degenerative disk disease, lumbar

spinal stenosis.



PROCEDURE:  Lumbar epidural steroid injection in translaminar approach at the

L4-L5 level using C-arm fluoroscopic guidance under sterile prep and drape of

local anesthetic.



INDICATIONS:  This is 120 mg Depo-Medrol plus disease preservative-free normal

saline and 2 mL of Isovue for contrast.



CONDITION AT DISCHARGE:  Stable.  The patient tolerated procedure well, had no

complications.

 



______________________________

JUSTIN STILL MD



DR:  CHRISTIANO/dedrick  JOB#:  [id number] / [id number]

DD:  04/26/2017 09:11  DT:  04/27/2017 01:05

## 2017-05-14 ENCOUNTER — HOSPITAL ENCOUNTER (EMERGENCY)
Dept: HOSPITAL 61 - ER | Age: 72
Discharge: HOME | End: 2017-05-14
Payer: MEDICARE

## 2017-05-14 VITALS — BODY MASS INDEX: 31.16 KG/M2 | WEIGHT: 187 LBS | HEIGHT: 65 IN

## 2017-05-14 VITALS — SYSTOLIC BLOOD PRESSURE: 152 MMHG | DIASTOLIC BLOOD PRESSURE: 71 MMHG

## 2017-05-14 DIAGNOSIS — Z90.49: ICD-10-CM

## 2017-05-14 DIAGNOSIS — Z88.8: ICD-10-CM

## 2017-05-14 DIAGNOSIS — Z88.5: ICD-10-CM

## 2017-05-14 DIAGNOSIS — Z88.6: ICD-10-CM

## 2017-05-14 DIAGNOSIS — M54.5: ICD-10-CM

## 2017-05-14 DIAGNOSIS — I10: ICD-10-CM

## 2017-05-14 DIAGNOSIS — G89.29: Primary | ICD-10-CM

## 2017-05-14 LAB
BACTERIA #/AREA URNS HPF: 0 /HPF
BILIRUB UR QL STRIP: NEGATIVE
GLUCOSE UR STRIP-MCNC: NEGATIVE MG/DL
NITRITE UR QL STRIP: NEGATIVE
PH UR STRIP: 7 [PH]
PROT UR STRIP-MCNC: NEGATIVE MG/DL
RBC #/AREA URNS HPF: 0 /HPF (ref 0–2)
SP GR UR STRIP: 1.01
SQUAMOUS #/AREA URNS LPF: (no result) /LPF
UROBILINOGEN UR-MCNC: 0.2 MG/DL
WBC #/AREA URNS HPF: (no result) /HPF (ref 0–4)

## 2017-05-14 PROCEDURE — 81001 URINALYSIS AUTO W/SCOPE: CPT

## 2017-05-14 PROCEDURE — 99285 EMERGENCY DEPT VISIT HI MDM: CPT

## 2017-05-14 PROCEDURE — 96372 THER/PROPH/DIAG INJ SC/IM: CPT

## 2017-05-14 PROCEDURE — 72100 X-RAY EXAM L-S SPINE 2/3 VWS: CPT

## 2017-05-14 NOTE — RAD
Examination: 2 views of the lumbar spine



History: History of fall, pain



Comparison: MRI from 4/25/2017



Findings:



Moderate multilevel degenerative changes identified in the visualized

thoracolumbar spine. There is mild compression change of T12, L2 vertebral

bodies grossly similar to prior exam. There is minimal anterolisthesis of L4

on L5. Moderate multilevel degenerative disease identified in the lumbar

spine. Tubing projects over the left mid abdomen probably external to the

patient. Correlate clinically.



Impression:



1. Moderate degenerative changes lumbar spine.



2. Mild compression changes of T12, L2 vertebral bodies are similar to prior

exam.

## 2017-05-14 NOTE — ED.ADGEN
Past Medical History


Past Medical History:  Hypertension, Other


Additional Past Medical Histor:  chronic lower back pain


Past Surgical History:  Cholecystectomy, Other


Additional Past Surgical Histo:  right mastectomy; cervical neck; left 

bunionectomy,CARDIAC STENT


Alcohol Use:  None


Drug Use:  None





Adult General


Chief Complaint


Chief Complaint:  MECHANICAL FALL





HPI


HPI





Patient is a 71  year old and, with a history of hypertension, CAD, chronic 

back pain for which she uses a brace, and follows with Dr. Ravinder ibrahim pain 

management, who presents emergency department complaining of low back pain 

after a mechanical fall. Patient states that she was in the restroom of her 

[employer], she states that she slipped on the wet floor and fell, striking her 

lower back against the porcelain toilet bowl. She states she landed on her 

buttocks. She denies striking her head or neck. She states that she was able to 

get back up and ambulated was experiencing worsening pain in the right side of 

her lower back. She does that she has several "fractures" there which were 

diagnosed during a previous hospitalization for back pain several months ago. 

Patient states she wears a back brace for support, doesn't this time, states it 

has not been helping. She states she took a Flexeril and 10:00 this morning 

after the incident occurred but is not helped with her pain. She denies any 

weakness numbness or tingling, any chest pain or shortness of breath, any 

nausea or vomiting, any preceding symptoms before her fall, states it was 

purely mechanical due to the wet floor. She does take a daily baby aspirin, no 

other blood thinners. She states she is compliant with her medications for 

blood pressure, she does have a history of a cardiac stent.





Review of Systems


Review of Systems


Constitutional:  Denies fever or chills. []


Eyes:  Denies change in visual acuity. []


HENT:  Denies nasal congestion or sore throat. [] 


Respiratory:  Denies cough or shortness of breath. [] 


Cardiovascular:  Denies chest pain or edema. [] 


GI:  Denies abdominal pain, nausea, vomiting, bloody stools or diarrhea. [] 


:  Denies dysuria. [] 


Musculoskeletal:  Denies back pain or joint pain. [] 


Integument:  Denies rash. [] 


Neurologic:  Denies headache, focal weakness or sensory changes. [] 


Endocrine:  Denies polyuria or polydipsia. [] 


Lymphatic:  Denies swollen glands. [] 


Psychiatric:  Denies depression or anxiety. []





Current Medications


Current Medications





Current Medications








 Medications


  (Trade)  Dose


 Ordered  Sig/Mayte  Start Time


 Stop Time Status Last Admin


Dose Admin


 


 Cyclobenzaprine


 HCl


  (Flexeril)  5 mg  1X  ONCE  17 16:15


 17 16:16 DC 17 16:25


5 MG


 


 Fentanyl Citrate


  (Fentanyl 2ml


 Vial)  50 mcg  1X  ONCE  17 17:00


 17 17:01 DC 17 16:59


50 MCG


 


 Oxycodone HCl


  (Roxicodone)  5 mg  1X  ONCE  17 16:15


 17 16:16 DC 17 16:25


5 MG











Allergies


Allergies





Allergies








Coded Allergies Type Severity Reaction Last Updated Verified


 


  aspirin Allergy Intermediate  17 Yes


 


  codeine Allergy Intermediate  17 Yes


 


  diazepam Allergy Intermediate  17 Yes


 


  hydrocodone Allergy Intermediate  17 Yes


 


  ibuprofen Allergy Intermediate  17 Yes


 


  morphine Allergy Intermediate  17 Yes


 


  promethazine Allergy Intermediate  17 Yes


 


Uncoded Allergies Type Severity Reaction Last Updated Verified


 


  IM SHOTS Allergy Unknown  14 











Physical Exam


Physical Exam





Constitutional: Well developed, well nourished, no acute distress, non-toxic 

appearance. []


HENT: Normocephalic, atraumatic, bilateral external ears normal, oropharynx 

moist, no oral exudates, nose normal. []


Eyes: PERRLA, EOMI, conjunctiva normal, no discharge. [] 


Neck: Normal range of motion, no tenderness, supple, no stridor. [] 


Cardiovascular:Heart rate regular rhythm, 3-5 systolic murmur, S1, S2, no rubs 

or gallops. []


Lungs & Thorax:  Bilateral breath sounds clear to auscultation, no wheezing, 

rhonchi, rales. No chest wall crepitus or tenderness.


Abdomen: Bowel sounds normal, soft, no tenderness, no rebound, rigidity, no 

guarding, no masses, no pulsatile masses. [] 


Skin: Warm, dry, no erythema, no rash. [] 


Back: Patient with tenderness palpation along the right paraspinal muscles of 

the lumbar spine, no step-offs or deformities appreciated, no CVA tenderness. [

] 


Extremities: No tenderness, no cyanosis, no clubbing, ROM intact, no edema. [] 


Neurologic: Alert and oriented X 3, normal motor function, normal sensory 

function, no focal deficits noted. []


Psychologic: Affect normal, judgement normal, mood normal. []





Current Patient Data


Vital Signs





 Vital Signs








  Date Time  Temp Pulse Resp B/P (MAP) Pulse Ox O2 Delivery O2 Flow Rate FiO2


 


17 17:25  93 20 152/71 (98) 99 Room Air  


 


17 16:07 99.7       





 99.7       








Lab Values





 Laboratory Tests








Test


  17


16:05


 


Urine Collection Type Unknown  


 


Urine Color Yellow  


 


Urine Clarity Clear  


 


Urine pH 7.0  


 


Urine Specific Gravity 1.015  


 


Urine Protein


  Negative mg/dL


(NEG-TRACE)


 


Urine Glucose (UA)


  Negative mg/dL


(NEG)


 


Urine Ketones (Stick)


  Negative mg/dL


(NEG)


 


Urine Blood


  Negative (NEG)


 


 


Urine Nitrite


  Negative (NEG)


 


 


Urine Bilirubin


  Negative (NEG)


 


 


Urine Urobilinogen Dipstick


  0.2 mg/dL (0.2


mg/dL)


 


Urine Leukocyte Esterase


  Moderate (NEG)


 


 


Urine RBC 0 /HPF (0-2)  


 


Urine WBC


  5-10 /HPF


(0-4)


 


Urine Squamous Epithelial


Cells Few /LPF  


 


 


Urine Bacteria


  0 /HPF (0-FEW)


 


 


Urine Mucus Slight /LPF  











EKG


EKG


Not indicated. []





Radiology/Procedures


Radiology/Procedures


[] [facility]


 [address] [address]


 [phone]


 


 IMAGING REPORT





 Signed





PATIENT: BRANDON GUERRERO ACCOUNT: [id number] MRN#: [id number]


: 1945 LOCATION: ER AGE: 71


SEX: F EXAM DT: 17 ACCESSION#: [id number]


STATUS: PRE ER ORD. PHYSICIAN: GASPER REYNA DO 


REASON: fall/pain


PROCEDURE: LUMBAR SPINE 2-3V





Examination: 2 views of the lumbar spine





History: History of fall, pain





Comparison: MRI from 2017





Findings:





Moderate multilevel degenerative changes identified in the visualized


thoracolumbar spine. There is mild compression change of T12, L2 vertebral


bodies grossly similar to prior exam. There is minimal anterolisthesis of L4


on L5. Moderate multilevel degenerative disease identified in the lumbar


spine. Tubing projects over the left mid abdomen probably external to the


patient. Correlate clinically.





Impression:





1. Moderate degenerative changes lumbar spine.





2. Mild compression changes of T12, L2 vertebral bodies are similar to prior


exam.




















DICTATED and SIGNED BY:     CORA MUHAMMAD MD


DATE:     17 1630





CC: GASPER REYNA DO; MIN DUMONT MD ~





Course & Med Decision Making


Course & Med Decision Making


Pertinent Labs and Imaging studies reviewed. (See chart for details)





Patient with normal neurologic examination, did ambulate into the ED without 

issue. Initially declined IM injection, received hydrocodone and Flexeril 

emergency department, 5 mg each, and reevaluation states that she is still 

having some pain, she is pacing the room pressing her hands answer back. At 

that time for discussion she was agreeable to receiving an injection, did 

receive an IM 50 mg injection of fentanyl. On reevaluation, patient states she 

is feeling much better, is now seated comfortably in the bed, and is able to 

ambulate without pain. She states that she is ready to be discharged home at 

this time, states that she will contact Dr. Castellon, her pain management 

physician tomorrow to schedule an appointment, she states that about every 3 

months or so for injections. We did discuss concerning symptoms that prompt 

return to the emergency department, patient voiced understanding and agreement. 

She does have her Flexeril home, therefore I did give her 12 tablets of Toradol 

50 mg, be taken once every 6 hours as needed for pain which she has used for 

pain exacerbation in the past with good effect. Patient discharged home 

ambulated without difficulty in stable condition, with family with plan as 

above.





Dragon Disclaimer


Dragon Disclaimer


This electronic medical record was generated, in whole or in part, using a 

voice recognition dictation system.





Departure


Impression:  


 Primary Impression:  


 Back pain


Disposition:   HOME, SELF-CARE


Condition:  IMPROVED


Scripts


Tramadol Hcl (TRAMADOL HCL) 50 Mg Tablet


1 TAB PO PRN Q6HRS, #12 TAB


   Prov: GASPER REYNA DO         17











GASPER REYNA DO May 14, 2017 16:14

## 2017-05-18 ENCOUNTER — HOSPITAL ENCOUNTER (EMERGENCY)
Dept: HOSPITAL 61 - ER | Age: 72
Discharge: HOME | End: 2017-05-18
Payer: MEDICARE

## 2017-05-18 VITALS
SYSTOLIC BLOOD PRESSURE: 147 MMHG | SYSTOLIC BLOOD PRESSURE: 147 MMHG | DIASTOLIC BLOOD PRESSURE: 75 MMHG | DIASTOLIC BLOOD PRESSURE: 75 MMHG

## 2017-05-18 VITALS — WEIGHT: 187 LBS | BODY MASS INDEX: 31.16 KG/M2 | HEIGHT: 65 IN

## 2017-05-18 DIAGNOSIS — Z88.6: ICD-10-CM

## 2017-05-18 DIAGNOSIS — Z88.8: ICD-10-CM

## 2017-05-18 DIAGNOSIS — I10: ICD-10-CM

## 2017-05-18 DIAGNOSIS — M54.5: ICD-10-CM

## 2017-05-18 DIAGNOSIS — G89.29: Primary | ICD-10-CM

## 2017-05-18 DIAGNOSIS — Z88.5: ICD-10-CM

## 2017-05-18 PROCEDURE — 96372 THER/PROPH/DIAG INJ SC/IM: CPT

## 2017-05-18 PROCEDURE — 99284 EMERGENCY DEPT VISIT MOD MDM: CPT

## 2017-05-18 NOTE — PHYS DOC
Past Medical History


Past Medical History:  Hypertension, Other


Additional Past Medical Histor:  chronic lower back pain


Past Surgical History:  Cholecystectomy, Other


Additional Past Surgical Histo:  cervical neck; left bunionectomy,CARDIAC STENT


Alcohol Use:  None


Drug Use:  None





Adult General


Chief Complaint


Chief Complaint:  BACK PAIN - NO INJURY





HPI


HPI





Patient is a 71  year old female who presents with complaint of low back pain. 

Patient states that she has history of chronic low back pain and has had 

frequent flareups recently. Patient was seen last 4 days ago in the emergency 

department and treated for an acute exacerbation. Patient states initially her 

symptoms had improved, however starting tonight at approximately 8:00 she 

started having worsening symptoms. Patient states that the pain radiates from 

her low back down into her right hip which she states is typical. Patient 

denies any loss of bowel or bladder control, foot drop, or saddle anesthesia 

associated with her symptoms. Patient took Flexeril orally approximately 2 

hours prior to arrival which she states has not provided any relief. Patient 

also took oral pain medication 5 hours ago. Patient states that she has an 

appointment later today at 01:15 p.m. with Dr. Lawler of neurosurgery. Patient 

rates her pain as 10 out of 10 and states that she could not wait until her 

appointment to be seen.





Review of Systems


Review of Systems





Constitutional: Denies fever or chills []


Eyes: Denies change in visual acuity, redness, or eye pain []


HENT: Denies nasal congestion or sore throat []


Respiratory: Denies cough or shortness of breath []


Cardiovascular: Denies chest pain or edema []


GI: Denies abdominal pain, nausea, vomiting, bloody stools or diarrhea []


: Denies dysuria or hematuria []


Musculoskeletal: Back pain []


Integument: Denies rash or skin lesions []


Neurologic: Denies headache, focal weakness or sensory changes []





Current Medications


Current Medications





Current Medications








 Medications


  (Trade)  Dose


 Ordered  Sig/Mayte  Start Time


 Stop Time Status Last Admin


Dose Admin


 


 Fentanyl Citrate


  (Fentanyl 2ml


 Vial)  50 mcg  1X  ONCE  5/18/17 03:15


 5/18/17 03:16 DC 5/18/17 03:14


50 MCG


 


 Ondansetron HCl


  (Zofran Odt)  4 mg  1X  ONCE  5/18/17 03:15


 5/18/17 03:16 DC 5/18/17 03:11


4 MG


 


 Prednisone


  (Prednisone)  40 mg  1X  ONCE  5/18/17 03:15


 5/18/17 03:16 DC 5/18/17 03:11


40 MG


 


 Tramadol HCl


  (Ultram)  50 mg  1X  ONCE  5/18/17 04:30


 5/18/17 04:31 UNV  


 











Allergies


Allergies





Allergies








Coded Allergies Type Severity Reaction Last Updated Verified


 


  aspirin Allergy Intermediate  4/25/17 Yes


 


  codeine Allergy Intermediate  4/25/17 Yes


 


  diazepam Allergy Intermediate  4/25/17 Yes


 


  hydrocodone Allergy Intermediate  4/25/17 Yes


 


  ibuprofen Allergy Intermediate  4/25/17 Yes


 


  morphine Allergy Intermediate  4/25/17 Yes


 


  promethazine Allergy Intermediate  4/25/17 Yes


 


Uncoded Allergies Type Severity Reaction Last Updated Verified


 


  IM SHOTS Allergy Unknown  5/5/14 











Physical Exam


Physical Exam





Constitutional: Alert, afebrile, appears in mild to moderate discomfort. []


HENT: Normocephalic, atraumatic, bilateral external ears normal, oropharynx 

moist, no oral exudates, nose normal. []


Eyes: PERRLA, EOMI, conjunctiva normal, no discharge. [] 


Neck: Normal range of motion, no tenderness, supple, no stridor. [] 


Cardiovascular:Heart rate regular rhythm, no murmur []


Lungs & Thorax:  Bilateral breath sounds clear to auscultation []


Abdomen: Bowel sounds normal, soft, no tenderness, no masses, no pulsatile 

masses. [] 


Skin: Warm, dry, no erythema, no rash. [] 


Back: No midline tenderness, bilateral lower lumbar paraspinous muscle 

tenderness to palpation, straight leg test positive in right lower extremity [] 


Extremities: No tenderness, no cyanosis, no clubbing, ROM intact, no edema. [] 


Neurologic: Alert and oriented X 3, normal motor function, normal sensory 

function, no focal deficits noted. []





Current Patient Data


Vital Signs





 Vital Signs








  Date Time  Temp Pulse Resp B/P (MAP) Pulse Ox O2 Delivery O2 Flow Rate FiO2


 


5/18/17 03:14   16  97   


 


5/18/17 02:44 98.7 97  175/86 (115)  Room Air  





 98.7       











EKG


EKG


Not performed []





Radiology/Procedures


Radiology/Procedures


Not performed []





Course & Med Decision Making


Course & Med Decision Making


Pertinent Labs and Imaging studies reviewed. (See chart for details)





Patient was given IM fentanyl,  oral prednisone, and oral Zofran in the 

emergency department. Patient noted improvement in her pain after treatment. 

Patient was given oral tramadol for continued treatment of acute on chronic 

back pain. Advised patient to follow-up with Dr. Gan as scheduled later 

today. Advised return emergency department for any worsening symptoms. Patient 

voiced understanding and in agreement with treatment plan.





Dragon Disclaimer


Dragon Disclaimer


This electronic medical record was generated, in whole or in part, using a 

voice recognition dictation system.





Departure


Departure


Impression:  


 Primary Impression:  


 Acute exacerbation of chronic low back pain


Disposition:  01 HOME, SELF-CARE


Condition:  IMPROVED


Referrals:  


MIN DUMONT MD (PCP)


Patient Instructions:  Chronic Back Pain





Additional Instructions:  


Follow-up with Dr. Lawler later today as scheduled. Return to the emergency 

department for any worsening symptoms.


Scripts


Tramadol Hcl (TRAMADOL HCL) 50 Mg Tablet


50 MG PO Q6H Y for PAIN, #20 TAB


   Prov: CORONA DE LA TORRE MD         5/18/17 


Prednisone (PREDNISONE) 20 Mg Tablet


1 TAB PO BID, #10 TAB


   Prov: CORONA DE LA TORRE MD         5/18/17











CORONA DE LA TORRE MD May 18, 2017 03:06

## 2017-06-06 ENCOUNTER — HOSPITAL ENCOUNTER (OUTPATIENT)
Dept: HOSPITAL 61 - PNCL | Age: 72
Discharge: HOME | End: 2017-06-06
Attending: ANESTHESIOLOGY
Payer: MEDICARE

## 2017-06-06 DIAGNOSIS — I10: ICD-10-CM

## 2017-06-06 DIAGNOSIS — Z90.49: ICD-10-CM

## 2017-06-06 DIAGNOSIS — Z87.39: ICD-10-CM

## 2017-06-06 DIAGNOSIS — M51.16: Primary | ICD-10-CM

## 2017-06-06 DIAGNOSIS — I25.10: ICD-10-CM

## 2017-06-06 DIAGNOSIS — M48.06: ICD-10-CM

## 2017-06-06 DIAGNOSIS — Z87.442: ICD-10-CM

## 2017-06-06 PROCEDURE — 62323 NJX INTERLAMINAR LMBR/SAC: CPT

## 2017-06-07 NOTE — PAIN
DATE OF SERVICE:  06/06/2017



DIAGNOSES:

1.  Lumbar radiculopathy with lumbar degenerative disk disease, lumbar spinal

stenosis.

2.  Cervical radiculopathy with cervical degenerative disk disease.



HISTORY OF PRESENT ILLNESS:  The patient is a 71-year-old female who returns for

followup status post cervical epidural steroid injection x 1 and a lumbar

epidural steroid injection x 1.  The patient reports still having significant

pain, though she is better, but only by about 50% overall of the neck and low

back pain.  Low back is still significantly painful.  The patient reports no new

motor or sensory deficits, but still significant pain across the low back and

the right lower extremity.  It is a constant aching pain rated as an 8 on a

scale of 10.  The patient reports that shot helped, but only for a short period

of time about 1 week or so.  The patient reports no new motor or sensory

deficits, no new bowel or bladder incontinence.



PHYSICAL EXAMINATION:

VITAL SIGNS:  The patient's blood pressure is 146/84, pulse 84, respirations 18,

temperature 98.3 degrees Fahrenheit.  Weight is 188 pounds.

GENERAL:  The patient is awake, alert, oriented, appropriate, very pleasant

demeanor.

HEENT:  Head shows normocephalic, atraumatic.  Extraocular movements are intact

and symmetrical.  Oral cavity, mucous membranes are moist and pink.  Dentition

is intact.

NECK:  Shows anterior throat supple without palpable lymphadenopathy noted. 

Swallow reflex is symmetrical.

CHEST:  Shows normal on inspection.  Breath sounds clear to auscultation

bilaterally.

HEART:  Shows S1 and S2 clear.  No murmurs auscultated.

ABDOMEN:  Soft, nontender, nondistended.  No palpable organomegaly is noted.  No

rebound or guarding demonstrated.

BACK:  Shows spine grossly midline.  Some cervical paraspinous musculature shows

tenderness with palpation only in the base of the cervical paraspinous muscles

and in the superior medial trapezius, but is symmetrical with firm without

trigger points, without radiation.  Low back shows a normal appearing lordotic

curvature.  The paraspinous musculature is symmetrical on inspection with

palpation shows some moderate tenderness throughout the upper, middle and lower

distribution of paraspinous muscles only diffusely without radiation.  No

tenderness over the spinous processes, the sacrum or sacroiliac regions.  The

patient does show good rotational motion of lumbar spine, both laterally as well

as extension and flexion without exacerbation of pain.  Neck shows full

rotational motion as well without significant exacerbation except with extension

past about 20 degrees.

EXTREMITIES:  Lower extremities showed deep tendon reflexes 1+ in the patellar

and tendo calcaneus tendons.  Motor exam is strong with dorsiflexion, extension,

quadriceps and hamstring flexion ____ 4/5 on the right and 5/5 on left.



Options were discussed with the patient at this time.  The patient's old chart

was reviewed as her current medication regimen updated.  Current review of

systems updated today as well.  We will proceed with a second lumbar epidural

steroid injection today with fluoroscopic guidance.  Risks were again discussed

including, but not limited to bleeding, infection, possibility of epidural

hematoma and subsequent neurologic compromise, dural puncture, headaches, spinal

cord and/or nerve damage, side effects of steroid medication and poor results

regarding pain control.  The patient understands and wishes to proceed.  The

patient will return to clinic in approximately 2 weeks for followup.  She was

counseled on return appointment, activity level and side effects to be aware of.

 Also, possibility of following up with the patient's neurosurgeon as she

reports that the surgery was recommended for her back and her neck, although she

is wishing to try more conservative measures.  We discussed physical therapy as

well.  We will initiate physical therapy with pool therapy for her low back, as

well as physical therapy for her neck and low back also.  The patient will

follow up once this is completed as well.



DIAGNOSES:  Lumbar radiculopathy with lumbar degenerative disk disease, lumbar

spinal stenosis.



PROCEDURE:  Lumbar epidural steroid injection in translaminar approach at the

L5-S1 level using C-arm fluoroscopic guidance under sterile prep and drape using

local anesthetic.



MEDICATIONS INJECTED:  120 mg Depo-Medrol plus 10 mL of preservative-free normal

saline and 2 mL of Isovue contrast.



CONDITION AT DISCHARGE:  Stable.  The patient tolerated procedure well and had

no complications.

 



______________________________

JUSTIN STILL MD



DR:  CHRISTIANO/dedrick  JOB#:  [id number] / [id number]

DD:  06/06/2017 11:01  DT:  06/07/2017 01:56

## 2017-07-22 ENCOUNTER — HOSPITAL ENCOUNTER (EMERGENCY)
Dept: HOSPITAL 61 - ER | Age: 72
Discharge: HOME | End: 2017-07-22
Payer: MEDICARE

## 2017-07-22 VITALS — SYSTOLIC BLOOD PRESSURE: 136 MMHG | DIASTOLIC BLOOD PRESSURE: 82 MMHG

## 2017-07-22 VITALS — BODY MASS INDEX: 31.16 KG/M2 | WEIGHT: 187 LBS | HEIGHT: 65 IN

## 2017-07-22 DIAGNOSIS — Z88.5: ICD-10-CM
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
## 2017-07-22 DIAGNOSIS — Z95.5: ICD-10-CM

## 2017-07-22 DIAGNOSIS — Z88.8: ICD-10-CM
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
## 2017-07-22 DIAGNOSIS — I10: ICD-10-CM

## 2017-07-22 DIAGNOSIS — Z90.49: ICD-10-CM

## 2017-07-22 DIAGNOSIS — G89.29: ICD-10-CM

## 2017-07-22 DIAGNOSIS — Z88.6: ICD-10-CM

## 2017-07-22 DIAGNOSIS — S86.912A: Primary | ICD-10-CM
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
## 2017-07-22 PROCEDURE — 93971 EXTREMITY STUDY: CPT

## 2017-07-22 NOTE — RAD
Indication left leg pain and swelling.



Grayscale color Doppler and spectral analysis was performed. The examination

was targeted to the veins of the left lower extremity.



The common femoral, femoral and popliteal vessels demonstrate normal flow

compressibility and augmentation. No thrombus is seen. Visualized calf veins

appeared unremarkable. The right common femoral vein also appeared normal.



IMPRESSION: Negative left lower extremity venous analysis for DVT

## 2017-07-22 NOTE — PHYS DOC
Past Medical History


Past Medical History:  Hypertension, Other


Additional Past Medical Histor:  chronic lower back pain


Past Surgical History:  Cholecystectomy, Other


Additional Past Surgical Histo:  cervical neck; left bunionectomy,CARDIAC STENT


Alcohol Use:  None


Drug Use:  None





Adult General


Chief Complaint


Chief Complaint:  LOWER EXT PAIN





[facility]


HPI





Patient is a 72  year old female presents to the emergency department with a two

-day history of left lower extremity pain. Patient states that she was walking 

down the steps, when she stepped down she developed anterior left lower 

extremity pain. Patient reports she has not fall. States the pain has 

persisted. She is seeking further evaluation. Patient is concerned she may have 

a blood clot because "I had them before".





Review of Systems


Review of Systems





Constitutional: Denies fever or chills []


Eyes: Denies change in visual acuity, redness, or eye pain []


HENT: Denies nasal congestion or sore throat []


Respiratory: Denies cough or shortness of breath []


Cardiovascular: No additional information not addressed in HPI []


GI: Denies abdominal pain, nausea, vomiting, bloody stools or diarrhea []


: Denies dysuria or hematuria []


Musculoskeletal: left lower extremity pain


Integument: Denies rash or skin lesions []


Neurologic: Denies headache, focal weakness or sensory changes []


Endocrine: Denies polyuria or polydipsia []





Allergies


Allergies





Allergies








Coded Allergies Type Severity Reaction Last Updated Verified


 


  aspirin Allergy Intermediate  4/25/17 Yes


 


  codeine Allergy Intermediate  4/25/17 Yes


 


  diazepam Allergy Intermediate  4/25/17 Yes


 


  hydrocodone Allergy Intermediate  4/25/17 Yes


 


  ibuprofen Allergy Intermediate  4/25/17 Yes


 


  morphine Allergy Intermediate  4/25/17 Yes


 


  promethazine Allergy Intermediate  4/25/17 Yes


 


Uncoded Allergies Type Severity Reaction Last Updated Verified


 


  IM SHOTS Allergy Unknown  5/5/14 











Physical Exam


Physical Exam





Constitutional: Well developed, well nourished, no acute distress, non-toxic 

appearance. []


Cardiovascular:Heart rate regular rhythm, no murmur []


Lungs & Thorax:  Bilateral breath sounds clear to auscultation []


Abdomen: Bowel sounds normal, soft, no tenderness, no masses, no pulsatile 

masses. [] 


Skin: Warm, dry, no erythema, no rash. [] 


Back: No tenderness, no CVA tenderness. [] 


Extremities: Bilateral lower extremity exam, no swelling, no erythema. Patient 

is mildly tender to palpate over the tibialis anterior without bony tenderness 

on the left. Bilateral calves are supple. Negative Homans sign bilaterally. 

Neurovascular is intact distally.


Neurologic: Alert and oriented X 3, normal motor function, normal sensory 

function, no focal deficits noted. []


Psychologic: Affect normal, judgement normal, mood normal. []





Current Patient Data


Vital Signs





 Vital Signs








  Date Time  Temp Pulse Resp B/P (MAP) Pulse Ox O2 Delivery O2 Flow Rate FiO2


 


7/22/17 13:15 98.1 76 16  98 Room Air  





 98.1       











EKG


EKG


[]





Radiology/Procedures


Radiology/Procedures


Left lower extremity venous Doppler exam reviewed by the radiologist, negative 

for DVT. []





Course & Med Decision Making


Course & Med Decision Making


Patient's well score -1. I believe an alternative diagnosis to be at least as 

likely as DVT. Therefore, following the well's criteria for DVT, mechanically 

score should be reduced by 2 points. This is reflected in the Wells DVT score I'

ve assigned. 





Pertinent Labs and Imaging studies reviewed. (See chart for details)





[]





Dragon Disclaimer


Dragon Disclaimer


This electronic medical record was generated, in whole or in part, using a 

voice recognition dictation system.





Departure


Departure


Impression:  


 Primary Impression:  


 Muscle strain of left lower extremity


Disposition:  01 HOME, SELF-CARE


Condition:  STABLE


Referrals:  


MIN DUMONT MD (PCP)


Patient Instructions:  Muscle Strain





Additional Instructions:  


Moist heat to affected area. Ibuprofen over-the-counter as labeled and is 

indicated for symptom management





Problem Qualifiers








 Primary Impression:  


 Muscle strain of left lower extremity


 Encounter type:  initial encounter  Qualified Codes:  S86.912A - Strain of 

unspecified muscle(s) and tendon(s) at lower leg level, left leg, initial 

encounter








UTE GARCIAS APRN Jul 22, 2017 13:37

## 2017-08-07 ENCOUNTER — HOSPITAL ENCOUNTER (EMERGENCY)
Dept: HOSPITAL 61 - ER | Age: 72
Discharge: HOME | End: 2017-08-07
Payer: MEDICARE

## 2017-08-07 VITALS — BODY MASS INDEX: 31.16 KG/M2 | WEIGHT: 187 LBS | HEIGHT: 65 IN

## 2017-08-07 VITALS — SYSTOLIC BLOOD PRESSURE: 177 MMHG | DIASTOLIC BLOOD PRESSURE: 84 MMHG

## 2017-08-07 DIAGNOSIS — Z88.5: ICD-10-CM

## 2017-08-07 DIAGNOSIS — Z95.5: ICD-10-CM

## 2017-08-07 DIAGNOSIS — I10: ICD-10-CM

## 2017-08-07 DIAGNOSIS — G89.29: Primary | ICD-10-CM

## 2017-08-07 DIAGNOSIS — M54.5: ICD-10-CM

## 2017-08-07 DIAGNOSIS — Z90.49: ICD-10-CM

## 2017-08-07 DIAGNOSIS — Z88.8: ICD-10-CM

## 2017-08-07 DIAGNOSIS — Z86.718: ICD-10-CM

## 2017-08-07 DIAGNOSIS — Z88.6: ICD-10-CM

## 2017-08-07 PROCEDURE — 96372 THER/PROPH/DIAG INJ SC/IM: CPT

## 2017-08-07 PROCEDURE — 99284 EMERGENCY DEPT VISIT MOD MDM: CPT

## 2017-08-07 NOTE — PHYS DOC
Past Medical History


Past Medical History:  DVT, Hypertension, Other


Additional Past Medical Histor:  chronic lower back pain


Past Surgical History:  Cholecystectomy, Other


Additional Past Surgical Histo:  cervical neck; left bunionectomy,CARDIAC STENT


Alcohol Use:  None


Drug Use:  None





Adult General


Chief Complaint


Chief Complaint:  BACK PAIN - NO INJURY





HPI


HPI





Patient is a 72  year old female who presents with complaint of low back pain. 

Patient states that she awoke this morning around 3:00 AM due to worsening 

symptoms. Patient states that her pain started last night. Patient has history 

of chronic pain in her low back due to degenerative disc disease. Patient 

states that she has been following with Dr. Justin Castellon of the pain management 

clinic. Patient states that her pain currently is radiating into her left 

thigh. Patient rates pain as 10 out of 10. Patient denies any recent trauma or 

injury. Patient denies any loss of bowel or bladder control, foot drop, or 

saddle anesthesia. Patient took gabapentin and Flexeril at 3:00 AM but states 

that this has not helped her symptoms. Patient denies any other symptoms 

including dysuria, fever, nausea, or vomiting.





Review of Systems


Review of Systems





Constitutional: Denies fever or chills []


Eyes: Denies change in visual acuity, redness, or eye pain []


HENT: Denies nasal congestion or sore throat []


Respiratory: Denies cough or shortness of breath []


Cardiovascular: Denies chest pain or edema []


GI: Denies abdominal pain, nausea, vomiting, bloody stools or diarrhea []


: Denies dysuria or hematuria []


Musculoskeletal: Back pain radiating into left leg []


Integument: Denies rash or skin lesions []


Neurologic: Denies headache, focal weakness or sensory changes []


Endocrine: Denies polyuria or polydipsia []





Current Medications


Current Medications





Current Medications








 Medications


  (Trade)  Dose


 Ordered  Sig/Mayte  Start Time


 Stop Time Status Last Admin


Dose Admin


 


 Dexamethasone


 Sodium Phosphate


  (Decadron)  12 mg  1X  ONCE  8/7/17 07:15


 8/7/17 07:16 DC 8/7/17 07:15


12 MG


 


 Hydromorphone HCl


  (Dilaudid)  1 mg  1X  ONCE  8/7/17 07:15


 8/7/17 07:16 DC 8/7/17 07:17


1 MG











Allergies


Allergies





Allergies








Coded Allergies Type Severity Reaction Last Updated Verified


 


  aspirin Allergy Intermediate  4/25/17 Yes


 


  codeine Allergy Intermediate  4/25/17 Yes


 


  diazepam Allergy Intermediate  4/25/17 Yes


 


  hydrocodone Allergy Intermediate takes PERCOCET at home 8/7/17 Yes


 


  ibuprofen Allergy Intermediate  4/25/17 Yes


 


  morphine Allergy Intermediate takes ULTRAM at home 8/7/17 Yes


 


  promethazine Allergy Intermediate  4/25/17 Yes


 


Uncoded Allergies Type Severity Reaction Last Updated Verified


 


  IM SHOTS Allergy Unknown  5/5/14 











Physical Exam


Physical Exam





Constitutional: Alert, afebrile, writhing in pain. []


HENT: Normocephalic, atraumatic, bilateral external ears normal, oropharynx 

moist, no oral exudates, nose normal. []


Eyes: PERRLA, EOMI, conjunctiva normal, no discharge. [] 


Neck: Normal range of motion, no tenderness, supple, no stridor. [] 


Cardiovascular:Heart rate regular rhythm, no murmur []


Lungs & Thorax:  Bilateral breath sounds clear to auscultation []


Abdomen: Bowel sounds normal, soft, no tenderness, no masses, no pulsatile 

masses. [] 


Skin: Warm, dry, no erythema, no rash. [] 


Back: Left paraspinous muscle tenderness to palpation in the lower lumbar region

, positive straight leg test in left lower extremity, no CVA tenderness. [] 


Extremities: No tenderness, no cyanosis, no clubbing, ROM intact, no edema. [] 


Neurologic: Alert and oriented X 3, normal motor function, normal sensory 

function, no focal deficits noted. []





Current Patient Data


Vital Signs





 Vital Signs








  Date Time  Temp Pulse Resp B/P (MAP) Pulse Ox O2 Delivery O2 Flow Rate FiO2


 


8/7/17 07:17   19  97 Room Air  


 


8/7/17 06:43 99.1 79      





 99.1       











EKG


EKG


Not performed []





Radiology/Procedures


Radiology/Procedures


Not performed []





Course & Med Decision Making


Course & Med Decision Making


Pertinent Labs and Imaging studies reviewed. (See chart for details)





Patient was given IM Dilaudid and Decadron in the emergency department with 

significant control of symptoms. Patient states that she feels better like to 

go home. The patient was written for Medrol Dosepak and tramadol for continued 

outpatient treatment. Advised patient follow-up with Dr. Castellon in the next 1-2 

days for reevaluation. Advised return emergency department for any worsening 

symptoms. Patient voiced understanding and in agreement with treatment plan.





Dragon Disclaimer


Dragon Disclaimer


This electronic medical record was generated, in whole or in part, using a 

voice recognition dictation system.





Departure


Departure


Impression:  


 Primary Impression:  


 Acute exacerbation of chronic low back pain


Disposition:  01 HOME, SELF-CARE


Condition:  IMPROVED


Referrals:  


MIN DUMONT MD (PCP)








JUSTIN CASTELLON MD


Patient Instructions:  Chronic Back Pain





Additional Instructions:  


Follow-up with Dr. Castellon of the pain management clinic in the next 2 days for 

reevaluation. Return to the emergency department for any worsening symptoms.


Scripts


Methylprednisolone (MEDROL) 4 Mg Tab.ds.pk


1 PKG PO UD, #1 PKG


   Prov: CORONA DE LA TORRE MD         8/7/17 


Tramadol Hcl (TRAMADOL HCL) 50 Mg Tablet


1 TAB PO PRN Q6HRS Y for PAIN, #30 TAB


   Prov: CORONA DE LA TORRE MD         8/7/17











CORONA DE LA TORRE MD Aug 7, 2017 07:08

## 2017-08-12 ENCOUNTER — HOSPITAL ENCOUNTER (EMERGENCY)
Dept: HOSPITAL 61 - ER | Age: 72
Discharge: HOME | End: 2017-08-12
Payer: MEDICARE

## 2017-08-12 VITALS — WEIGHT: 186 LBS | HEIGHT: 65 IN | BODY MASS INDEX: 30.99 KG/M2

## 2017-08-12 VITALS — DIASTOLIC BLOOD PRESSURE: 82 MMHG | SYSTOLIC BLOOD PRESSURE: 172 MMHG

## 2017-08-12 DIAGNOSIS — G89.29: Primary | ICD-10-CM

## 2017-08-12 DIAGNOSIS — Z87.442: ICD-10-CM

## 2017-08-12 DIAGNOSIS — Z88.8: ICD-10-CM

## 2017-08-12 DIAGNOSIS — Z88.5: ICD-10-CM

## 2017-08-12 DIAGNOSIS — Z88.6: ICD-10-CM

## 2017-08-12 DIAGNOSIS — M54.5: ICD-10-CM

## 2017-08-12 DIAGNOSIS — E66.9: ICD-10-CM

## 2017-08-12 DIAGNOSIS — I10: ICD-10-CM

## 2017-08-12 DIAGNOSIS — I25.10: ICD-10-CM

## 2017-08-12 DIAGNOSIS — I25.2: ICD-10-CM

## 2017-08-12 DIAGNOSIS — Z90.49: ICD-10-CM

## 2017-08-12 PROCEDURE — 99283 EMERGENCY DEPT VISIT LOW MDM: CPT

## 2017-08-12 PROCEDURE — 96372 THER/PROPH/DIAG INJ SC/IM: CPT

## 2017-08-12 NOTE — PHYS DOC
Past Medical History


Past Medical History:  CAD, Hypertension, Kidney Infection, Kidney Stone, [place]


Additional Past Medical Histor:  chronic lower back pain


Past Surgical History:  Appendectomy


Additional Past Surgical Histo:  disk in neck,abd surgery


Alcohol Use:  None


Drug Use:  None





Adult General


Chief Complaint


Chief Complaint:  BACK PAIN - NO INJURY





HPI


HPI





Patient is a 72  year old female who presents with acute on chronic lower back 

pain.  Patient states she has had 2 day history of lower back pain which is 

worse with movement, pain radiates down left leg.  She denies any trauma or fall

, no recollection of specific event causing onset of pain.  She denies fevers/

chills, abdominal pain, extremity numbness/weakness, bowel/bladder incontinence/

retention, saddle anesthesia.  Denies dysuria or hematuria.  She states this is 

similar to previous exacerbation of pain.  She has previous MRI documented 

below showing degenerative changes, spinal stenosis, compression fractures.  

She took tramadol & flexeril at home without relief of symptoms.





Review of Systems


Review of Systems


Constitutional: Denies fever or chills 


HENT: Denies nasal congestion or sore throat 


Respiratory: Denies cough or shortness of breath 


Cardiovascular:  Denies chest pain or edema


GI: Denies abdominal pain, nausea, vomiting, or diarrhea


: Denies dysuria or hematuria 


Musculoskeletal: Reports back pain


Integument: Denies rash or skin lesions 


Neurologic: Denies headache, focal weakness or sensory changes





Current Medications


Current Medications





Current Medications








 Medications


  (Trade)  Dose


 Ordered  Sig/[facility]  Start Time


 Stop Time Status Last Admin


Dose Admin


 


 Fentanyl Citrate


  (Fentanyl 2ml


 Vial)  50 mcg  1X  ONCE  8/12/17 22:30


 8/12/17 22:31 DC 8/12/17 22:48


50 MCG


 


 Oxycodone/


 Acetaminophen


  (Percocet 5/325)  1 tab  1X  ONCE  8/12/17 22:30


 8/12/17 22:30 DC  


 











Allergies


Allergies





Allergies








Coded Allergies Type Severity Reaction Last Updated Verified


 


  aspirin Allergy Intermediate  4/25/17 Yes


 


  codeine Allergy Intermediate  4/25/17 Yes


 


  diazepam Allergy Intermediate  4/25/17 Yes


 


  hydrocodone Allergy Intermediate takes PERCOCET at home 8/7/17 Yes


 


  ibuprofen Allergy Intermediate  4/25/17 Yes


 


  morphine Allergy Intermediate takes ULTRAM at home 8/7/17 Yes


 


  promethazine Allergy Intermediate  4/25/17 Yes


 


Uncoded Allergies Type Severity Reaction Last Updated Verified


 


  IM SHOTS Allergy Unknown  5/5/14 











Physical Exam


Physical Exam


Constitutional: obese, no acute distress, non-toxic appearance. 


HENT: Normocephalic, atraumatic, bilateral external ears normal, oropharynx 

moist, nose normal.


Eyes: conjunctiva normal, no discharge.


Neck: supple, no stridor.


Cardiovascular:  RRR, no murmurs, no edema. 


Lungs & Thorax:  LCTAB, no wheezing, no respiratory distress.


Abdomen: soft, nontender, nondistended.  no pulsatile abdominal mass.


Skin: Warm, dry, no erythema, no rash.


Back: diffuse lumbar spine tenderness without focal spinal tenderness, no step 

offs, mild bilateral paraspinous muscle tenderness.


Extremities: No tenderness, no edema. distal pulses palpable to bilateral LE, 

symmetric strength/sensation to LE.


Neurologic: Alert and oriented X 3, no focal deficits noted. 


Psychologic: Affect normal, judgement normal, mood normal.





Current Patient Data


Vital Signs





 Vital Signs








  Date Time  Temp Pulse Resp B/P (MAP) Pulse Ox O2 Delivery O2 Flow Rate FiO2


 


8/12/17 21:24 98.1 68 20  99 Room Air  





 98.1       











EKG


EKG


[]





Radiology/Procedures


Radiology/Procedures


MRI 4/2017





PROCEDURE: LUMBAR SPINE WO CONTRAST











PROCEDURE 


Lumbar spine MRI without contrast. 


 


HISTORY 


Pain. 


 


TECHNIQUE 


Multiplanar and multi sequence magnetic resonance imaging of the lumbar 


spine was performed without contrast. 


 


COMPARISON 


None. 


 


FINDINGS 


There is grade 1 anterolisthesis of L4 on L5, measuring 5 mm. There is 


minimal grade 1 anterolisthesis of L5 on S1, measuring 2 mm. There is 


grade 1 anterolisthesis of L1 on L2 measuring 4 mm, a component of which 


is due to a mild chronic L2 compression fracture with slight retropulsion 


of the L2 cortex into the central canal. There is heterogeneous signal 


within T11 with associated mild decreased T1 vertebral body height, also 


likely due to a chronic fracture. There is also diffusely heterogeneous 


signal throughout the remainder of the vertebral and sacral marrow, likely


due to fatty marrow placement. The conus terminates at L1. There is a 


filum lipoma, measuring 2 mm in caliber. There is no evidence of a 


tethered cord. There are small simple appearing renal cysts. There is 


suspected slight congenital narrowing of the central canal at the lumbar 


levels. There is slight epidural lipomatosis. 


 


At L1-L2, there is a disc bulge. There is mild facet arthropathy. There is


slight hypertrophy of the ligamentum flavum. There is mild left foraminal 


stenosis. 


At L2-L3, there is a disc bulge and endplate remodeling. There is mild 


facet arthropathy. There is hypertrophy of the ligamentum flavum. There is


mild left greater than right foraminal stenosis. There is minimal central 


canal stenosis. 


At L3-L4, there is a disc bulge and endplate remodeling. There is mild 


facet arthropathy. There is hypertrophy of the ligamentum flavum. There is


no stenosis. 


At L4-L5, there is a disc bulge and endplate remodeling. There is severe 


facet arthropathy. There is hypertrophy of the ligamentum flavum. There is


grade 1 anterolisthesis. There is mild bilateral foraminal stenosis. There


is severe central canal stenosis. 


At L5-S1, there is a diffuse disc bulge with posterior lateral endplate 


osteophytosis. There is mild facet arthropathy. There is mild to moderate 


bilateral foraminal stenosis. There is mild central canal stenosis. 


 


IMPRESSION 


1. Multilevel degenerative change throughout the lumbar spine, resulting 


in stenosis as described in detail above. Findings are most significant at


L4-L5, resulting in severe central canal stenosis. These findings are 


similar compared to the prior study. 


2. Stable mild chronic compression deformities of T11 and L2. There is 


heterogeneous marrow signal at these levels which may be due to 


heterogeneous marrow replacement, Paget's disease or pathologic fractures.


The stability compared to the prior study favors benignity. 


3. Suspected slight congenital narrowing of the central canal and mild 


epidural lipomatosis. 


4. Small filum lipoma. 


5. Grade 1 anterolisthesis of L4 on L5. 


 


Electronically signed by: Jazmín Buck (Apr 25, 2017 12:34:02)














DICTATED and SIGNED BY:     JAZMÍN BUCK MD


DATE:     04/25/17 1234[]





Course & Med Decision Making


Course & Med Decision Making


Pertinent Labs and Imaging studies reviewed. (See chart for details)


The patient presents with acute on chronic back pain.  She states similar to 

previous pain, declines imaging which is acceptable as no trauma & no acute 

neuro deficits or other concerning new symptoms.  Gave IM morphine in the ED, 

gave prescription for percocet for pain.  Recommend rest, ice/heat, continue 

tramadol & flexeril, gentle stretches, follow up with primary care in 2-3 days 

if not improving.  Return to the emergency department for symptoms of cauda 

equina syndrome or otherwise worsening condition.  Discharged home in stable 

condition.


[]





Dragon Disclaimer


Dragon Disclaimer


This electronic medical record was generated, in whole or in part, using a 

voice recognition dictation system.





Departure


Departure


Impression:  


 Primary Impression:  


 Acute exacerbation of chronic low back pain


Disposition:  01 HOME, SELF-CARE


Condition:  STABLE


Referrals:  


MIN DUMONT MD (PCP)


Patient Instructions:  Chronic Back Pain





Additional Instructions:  


You were seen in the emergency department today for back pain. Please rest, 

apply ice or heat, continue taking tramadol and Flexeril. If needed take 

Percocet for breakthrough pain. No drinking alcohol or driving while taking 

this medication. Follow-up as soon as possible with primary care physician 

ideally within about 2 days. Return to the emergency department for numbness or 

weakness in legs, loss of control of bowels or bladder him a numbness in your 

groin, any otherwise worsening condition.


Scripts


Oxycodone/Apap 5-325 (PERCOCET 5-325 MG TABLET) 1 Each Tablet


1-2 TAB PO Q4-6HRS, #10 TAB


   Prov: SATHYA SCHROEDER MD         8/12/17











SATHYA SCHROEDER MD Aug 12, 2017 22:58

## 2017-09-15 ENCOUNTER — HOSPITAL ENCOUNTER (EMERGENCY)
Dept: HOSPITAL 61 - ER | Age: 72
Discharge: HOME | End: 2017-09-15
Payer: MEDICARE

## 2017-09-15 VITALS — DIASTOLIC BLOOD PRESSURE: 86 MMHG | SYSTOLIC BLOOD PRESSURE: 181 MMHG

## 2017-09-15 VITALS — HEIGHT: 65 IN | WEIGHT: 186 LBS | BODY MASS INDEX: 30.99 KG/M2

## 2017-09-15 DIAGNOSIS — Z87.442: ICD-10-CM

## 2017-09-15 DIAGNOSIS — I10: ICD-10-CM

## 2017-09-15 DIAGNOSIS — I25.10: ICD-10-CM

## 2017-09-15 DIAGNOSIS — G89.29: Primary | ICD-10-CM

## 2017-09-15 DIAGNOSIS — Z88.8: ICD-10-CM

## 2017-09-15 DIAGNOSIS — Z90.49: ICD-10-CM

## 2017-09-15 DIAGNOSIS — Z88.5: ICD-10-CM

## 2017-09-15 DIAGNOSIS — I25.2: ICD-10-CM

## 2017-09-15 DIAGNOSIS — M54.42: ICD-10-CM

## 2017-09-15 DIAGNOSIS — Z88.6: ICD-10-CM

## 2017-09-15 PROCEDURE — 99283 EMERGENCY DEPT VISIT LOW MDM: CPT

## 2017-09-15 PROCEDURE — 96372 THER/PROPH/DIAG INJ SC/IM: CPT

## 2017-09-15 NOTE — PHYS DOC
Past Medical History


Past Medical History:  CAD, Hypertension, Kidney Infection, Kidney Stone, [place]


Additional Past Medical Histor:  chronic lower back pain


Past Surgical History:  Appendectomy


Additional Past Surgical Histo:  disk in neck,abd surgery


Alcohol Use:  None


Drug Use:  None





Adult General


Chief Complaint


Chief Complaint:  BACK PAIN - NO INJURY





HPI


HPI





Patient is a 72  year old female with history of chronic low back pain with 

sciatica to the left, hypertension,who presents with complaints of chronic low 

back pain. Patient denies any trauma. She states pain radiates to the left 

lower extremity which is chronic. Patient appears lethargic. She can barely 

keep her eyes open. She states she took Ultram oxycodone and muscle relaxer 

prior to coming to the ED. Patient denies any loss of bowel/ bladder function. 

She states she follows up with her PCP Dr. Bossman Harkins who has referred her 

to a new doctor at [place].





Review of Systems


Review of Systems





Constitutional: Denies fever or chills []


GI: Denies abdominal pain, nausea, vomiting, bloody stools or diarrhea []


: Denies dysuria or hematuria []


Musculoskeletal:  low back pain radiating to the left lower extremity


Integument: Denies rash or skin lesions []


Neurologic: Denies headache, focal weakness or sensory changes []





Current Medications


Current Medications





Current Medications








 Medications


  (Trade)  Dose


 Ordered  Sig/[facility]  Start Time


 Stop Time Status Last Admin


Dose Admin


 


 Dexamethasone


 Sodium Phosphate


  (Decadron)  10 mg  1X  ONCE  9/15/17 22:00


 9/15/17 22:01   


 











Allergies


Allergies





Allergies








Coded Allergies Type Severity Reaction Last Updated Verified


 


  aspirin Allergy Intermediate  4/25/17 Yes


 


  codeine Allergy Intermediate  4/25/17 Yes


 


  diazepam Allergy Intermediate  4/25/17 Yes


 


  hydrocodone Allergy Intermediate takes PERCOCET at home 8/7/17 Yes


 


  ibuprofen Allergy Intermediate  4/25/17 Yes


 


  morphine Allergy Intermediate takes ULTRAM at home 8/7/17 Yes


 


  promethazine Allergy Intermediate  4/25/17 Yes


 


Uncoded Allergies Type Severity Reaction Last Updated Verified


 


  IM SHOTS Allergy Unknown  5/5/14 











Physical Exam


Physical Exam





Constitutional: Well developed, well nourished, no acute distress, non-toxic 

appearance. []


HENT: Normocephalic, atraumatic, bilateral external ears normal, oropharynx 

moist, no oral exudates, nose normal. []


Eyes: PERRLA, EOMI, conjunctiva normal, no discharge. [] 


Neck: Normal range of motion, no tenderness, supple, no stridor. [] 


Cardiovascular:Heart rate regular rhythm, no murmur []


Lungs & Thorax:  Bilateral breath sounds clear to auscultation []


Abdomen: Bowel sounds normal, soft, no tenderness, no masses, no pulsatile 

masses. [] 


Skin: Warm, dry, no erythema, no rash. [] 


Back: Diffuse paraspinal muscle tenderness to the left SI joint, no midline 

lumbar spine tenderness, no CVA tenderness. [] 


Extremities: No tenderness, no cyanosis, no clubbing, ROM intact, no edema. [] 


Neurologic: Alert and oriented X 3, normal motor function, normal sensory 

function, no focal deficits noted. []


Psychologic: Patient appears lethargic.





Current Patient Data


Vital Signs





 Vital Signs








  Date Time  Temp Pulse Resp B/P (MAP) Pulse Ox O2 Delivery O2 Flow Rate FiO2


 


9/15/17 21:24 98.2 88 20  99 Room Air  





 98.2       











EKG


EKG


[]





Radiology/Procedures


Radiology/Procedures


[]





Course & Med Decision Making


Course & Med Decision Making


Pertinent Labs and Imaging studies reviewed. (See chart for details)





This is a 72-year-old female patient with history of chronic back pain 

presenting to the ED today with chronic back pain. Patient is requesting 

something for pain. She appears lethargic. She states she read he took 

hydrocodone cyclobenzaprine and Ultram prior to coming to the ED and would like 

more pain medicine. Informed patient I will give her Decadron in the ED and 

discharged with Medrol Dosepak but she will not be given any narcotics during 

today's visit. I requested she contacts her doctor on Monday and follows up as 

soon as possible.





Dragon Disclaimer


Dragon Disclaimer


This electronic medical record was generated, in whole or in part, using a 

voice recognition dictation system.





Departure


Departure


Impression:  


 Primary Impression:  


 Chronic back pain


 Additional Impression:  


 Chronic sciatica of left side


Disposition:  01 HOME, SELF-CARE


Condition:  STABLE


Referrals:  


BOSSMAN HARKINS MD (PCP)


Follow-up with your doctor on Monday


Patient Instructions:  Back Pain, Adult, Sciatica





Additional Instructions:  


You seen for chronic low back pain. Follow-up with your doctor on Monday. Come 

back to the emergency room if symptoms worsen.


Scripts


Methylprednisolone (MEDROL) 4 Mg Tab.ds.pk


1 PKG PO UD, #1 PKG


   Prov: JAREN CUNHA APRN         9/15/17





Problem Qualifiers








 Primary Impression:  


 Chronic back pain


 Back pain location:  low back pain  Back pain laterality:  left  Sciatica 

presence:  with sciatica  Sciatica laterality:  sciatica of left side  

Qualified Codes:  M54.42 - Lumbago with sciatica, left side; G89.29 - Other 

chronic pain








JAHAIRAJAREN MOYA APRN Sep 15, 2017 21:40

## 2017-10-29 ENCOUNTER — HOSPITAL ENCOUNTER (EMERGENCY)
Dept: HOSPITAL 61 - ER | Age: 72
Discharge: HOME | End: 2017-10-29
Payer: MEDICARE

## 2017-10-29 VITALS — DIASTOLIC BLOOD PRESSURE: 77 MMHG | SYSTOLIC BLOOD PRESSURE: 172 MMHG

## 2017-10-29 VITALS — HEIGHT: 65 IN | WEIGHT: 194 LBS | BODY MASS INDEX: 32.32 KG/M2

## 2017-10-29 DIAGNOSIS — Z95.5: ICD-10-CM

## 2017-10-29 DIAGNOSIS — I25.10: ICD-10-CM

## 2017-10-29 DIAGNOSIS — Z88.8: ICD-10-CM

## 2017-10-29 DIAGNOSIS — Z88.5: ICD-10-CM

## 2017-10-29 DIAGNOSIS — Z88.6: ICD-10-CM

## 2017-10-29 DIAGNOSIS — I10: ICD-10-CM

## 2017-10-29 DIAGNOSIS — R05: ICD-10-CM

## 2017-10-29 DIAGNOSIS — R53.83: ICD-10-CM

## 2017-10-29 DIAGNOSIS — M79.1: Primary | ICD-10-CM

## 2017-10-29 LAB
ALBUMIN SERPL-MCNC: 3.5 G/DL (ref 3.4–5)
ALP SERPL-CCNC: 272 U/L (ref 46–116)
ALT SERPL-CCNC: 17 U/L (ref 14–59)
ANION GAP SERPL CALC-SCNC: 7 MMOL/L (ref 6–14)
AST SERPL-CCNC: 18 U/L (ref 15–37)
BACTERIA #/AREA URNS HPF: 0 /HPF
BASOPHILS # BLD AUTO: 0 X10^3/UL (ref 0–0.2)
BASOPHILS NFR BLD: 1 % (ref 0–3)
BILIRUB DIRECT SERPL-MCNC: < 0.1 MG/DL (ref 0–0.2)
BILIRUB SERPL-MCNC: 0.1 MG/DL (ref 0.2–1)
BILIRUB UR QL STRIP: NEGATIVE
BUN SERPL-MCNC: 16 MG/DL (ref 7–20)
CALCIUM SERPL-MCNC: 8.9 MG/DL (ref 8.5–10.1)
CHLORIDE SERPL-SCNC: 109 MMOL/L (ref 98–107)
CO2 SERPL-SCNC: 28 MMOL/L (ref 21–32)
CREAT SERPL-MCNC: 1.3 MG/DL (ref 0.6–1)
EOSINOPHIL NFR BLD: 3 % (ref 0–3)
ERYTHROCYTE [DISTWIDTH] IN BLOOD BY AUTOMATED COUNT: 14.3 % (ref 11.5–14.5)
GFR SERPLBLD BASED ON 1.73 SQ M-ARVRAT: 49 ML/MIN
GLUCOSE SERPL-MCNC: 96 MG/DL (ref 70–99)
GLUCOSE UR STRIP-MCNC: NEGATIVE MG/DL
HCT VFR BLD CALC: 32.3 % (ref 36–47)
HGB BLD-MCNC: 10.3 G/DL (ref 12–15.5)
LYMPHOCYTES # BLD: 1.8 X10^3/UL (ref 1–4.8)
LYMPHOCYTES NFR BLD AUTO: 33 % (ref 24–48)
MCH RBC QN AUTO: 30 PG (ref 25–35)
MCHC RBC AUTO-ENTMCNC: 32 G/DL (ref 31–37)
MCV RBC AUTO: 92 FL (ref 79–100)
MONOCYTES NFR BLD: 10 % (ref 0–9)
NEUTROPHILS NFR BLD AUTO: 53 % (ref 31–73)
NITRITE UR QL STRIP: NEGATIVE
OBC FLU: (no result)
PH UR STRIP: 8 [PH]
PLATELET # BLD AUTO: 329 X10^3/UL (ref 140–400)
POTASSIUM SERPL-SCNC: 4.4 MMOL/L (ref 3.5–5.1)
PROT SERPL-MCNC: 7.3 G/DL (ref 6.4–8.2)
PROT UR STRIP-MCNC: NEGATIVE MG/DL
RBC # BLD AUTO: 3.5 X10^6/UL (ref 3.5–5.4)
RBC #/AREA URNS HPF: (no result) /HPF (ref 0–2)
SODIUM SERPL-SCNC: 144 MMOL/L (ref 136–145)
SP GR UR STRIP: 1.01
SQUAMOUS #/AREA URNS LPF: (no result) /LPF
UROBILINOGEN UR-MCNC: 1 MG/DL
WBC # BLD AUTO: 5.4 X10^3/UL (ref 4–11)
WBC #/AREA URNS HPF: (no result) /HPF (ref 0–4)

## 2017-10-29 PROCEDURE — 71010: CPT

## 2017-10-29 PROCEDURE — 99285 EMERGENCY DEPT VISIT HI MDM: CPT

## 2017-10-29 PROCEDURE — 87804 INFLUENZA ASSAY W/OPTIC: CPT

## 2017-10-29 PROCEDURE — 96374 THER/PROPH/DIAG INJ IV PUSH: CPT

## 2017-10-29 PROCEDURE — 83690 ASSAY OF LIPASE: CPT

## 2017-10-29 PROCEDURE — 83880 ASSAY OF NATRIURETIC PEPTIDE: CPT

## 2017-10-29 PROCEDURE — 93005 ELECTROCARDIOGRAM TRACING: CPT

## 2017-10-29 PROCEDURE — 83605 ASSAY OF LACTIC ACID: CPT

## 2017-10-29 PROCEDURE — 80076 HEPATIC FUNCTION PANEL: CPT

## 2017-10-29 PROCEDURE — 96361 HYDRATE IV INFUSION ADD-ON: CPT

## 2017-10-29 PROCEDURE — 80048 BASIC METABOLIC PNL TOTAL CA: CPT

## 2017-10-29 PROCEDURE — 96376 TX/PRO/DX INJ SAME DRUG ADON: CPT

## 2017-10-29 PROCEDURE — 85025 COMPLETE CBC W/AUTO DIFF WBC: CPT

## 2017-10-29 PROCEDURE — 81001 URINALYSIS AUTO W/SCOPE: CPT

## 2017-10-29 PROCEDURE — 36415 COLL VENOUS BLD VENIPUNCTURE: CPT

## 2017-10-29 PROCEDURE — 84484 ASSAY OF TROPONIN QUANT: CPT

## 2017-10-29 RX ADMIN — LABETALOL HYDROCHLORIDE ONE MG: 5 INJECTION, SOLUTION INTRAVENOUS at 07:39

## 2017-10-29 RX ADMIN — LABETALOL HYDROCHLORIDE ONE MG: 5 INJECTION, SOLUTION INTRAVENOUS at 08:22

## 2017-10-29 RX ADMIN — ACETAMINOPHEN ONE MG: 325 TABLET, FILM COATED ORAL at 08:13

## 2017-10-29 RX ADMIN — ACETAMINOPHEN ONE MG: 325 TABLET, FILM COATED ORAL at 08:16

## 2017-10-29 NOTE — RAD
CHEST AP ONLY



Clinical Indication: generalized aches



Comparison: Acute abdomen series dated 12/24/2014, chest radiograph dated

7/12/2012



Findings: 

Normal lung volume. No focal consolidation. Normal pulmonary vasculature. No

pleural effusion or pneumothorax. Stable cardiomegaly. The great vessels of

the thorax are stable. No acute osseous abnormality.



IMPRESSION:



1. No focal consolidation.

2. Stable cardiomegaly.

## 2017-10-29 NOTE — PHYS DOC
Past Medical History


Past Medical History:  CAD, Hypertension, Other


Additional Past Medical Histor:  PEPCID ULCER, BACK PROBLEMS


Past Surgical History:  Cholecystectomy, Tonsillectomy, Other


Additional Past Surgical Histo:  RIGHT WRIST, ABD EXPLORATORY,CARDIA STENT, 

NECK FUSION


Alcohol Use:  None


Drug Use:  None





Adult General


Chief Complaint


Chief Complaint:  GENERALIZED BODY ACHES





HPI


HPI





This is a pleasant 72-year-old female presenting to the emergency department 

today with generalized body aches from "head to toe." She reports since 

starting within the last 24 hours. She has had a cough that is dry and 

nonproductive over the past week that is associated with her symptoms. She 

denies any fevers chills nausea vomiting. She describes it as a dull achy pain. 

It is nonradiating and constant. Otherwise she denies any other ailments.





Review of systems was negative for specific chest pain or specific abdominal 

pain or shortness of breath. She denies diarrhea or constipation. All other 

review of systems is negative unless otherwise noted in history of present 

illness.





ED course: 72-year-old female presenting to the emergency Department 

generalized fatigue and body aches. Patient notably elevated in the emergency 

department which was brought down with 20 mg of labetalol. Otherwise afebrile 

with a normal heart rate. EKG obtained and compared to previous on April 20, 2017 which is similar to previous. Of note lead V3 has varying isoelectric 

baseline is difficult to interpret. Likely secondary to machine artifact. Chest 

x-ray obtained along with influenza testing and blood work. Patient's blood 

pressure responded to the labetalol. Blood work unremarkable. Otherwise testing 

unremarkable. I offered the patient acetaminophen for her pain was she was here 

which she declined. The patient was then discharged home in stable condition to 

follow up with their primary care physician over the next 2-3 days. They were 

to return if their symptoms worsened or if they were concerned for any reason. 

Face-to-face discharge instructions and return precautions were given. Patient'

s questions were answered to their satisfaction. Patient is comfortable plan.





Review of Systems


Review of Systems


SEE ABOVE.





Current Medications


Current Medications





Current Medications








 Medications


  (Trade)  Dose


 Ordered  Sig/[facility]  Start Time


 Stop Time Status Last Admin


Dose Admin


 


 Acetaminophen


  (Tylenol)  650 mg  1X  ONCE  10/29/17 08:45


 10/29/17 08:45 DC  


 


 


 Labetalol HCl


  (Normodyne)  20 mg  1X  ONCE  10/29/17 07:45


 10/29/17 07:46 DC 10/29/17 08:22


20 MG


 


 Sodium Chloride  500 ml @ 


 500 mls/hr  1X  ONCE  10/29/17 07:00


 10/29/17 07:59 DC 10/29/17 07:27


500 MLS/HR











Allergies


Allergies





Allergies








Coded Allergies Type Severity Reaction Last Updated Verified


 


  aspirin Allergy Intermediate  4/25/17 Yes


 


  codeine Allergy Intermediate  4/25/17 Yes


 


  diazepam Allergy Intermediate  4/25/17 Yes


 


  hydrocodone Allergy Intermediate takes PERCOCET at home 8/7/17 Yes


 


  ibuprofen Allergy Intermediate  4/25/17 Yes


 


  morphine Allergy Intermediate takes ULTRAM at home 8/7/17 Yes


 


  promethazine Allergy Intermediate  4/25/17 Yes


 


Uncoded Allergies Type Severity Reaction Last Updated Verified


 


  IM SHOTS Allergy Unknown  5/5/14 











Physical Exam


Physical Exam


SEE ABOVE





Constitutional: Well developed, well nourished, no acute distress, non-toxic 

appearance. 


HENT: Normocephalic, atraumatic, bilateral external ears normal, oropharynx 

moist, no oral exudates, nose normal. []


Eyes: PERRLA, EOMI, conjunctiva normal, no discharge.  


Neck: Normal range of motion, no tenderness, supple, no stridor.  


Cardiovascular:Heart rate regular rhythm, no murmur []


Lungs & Thorax:  Bilateral breath sounds clear to auscultation 


Abdomen: Bowel sounds normal, soft, no tenderness, no masses, no pulsatile 

masses.  


Skin: Warm, dry, no erythema, no rash. [] 


Back: No tenderness, no CVA tenderness.  


Extremities: No tenderness, no cyanosis, no clubbing, ROM intact, no edema.  


Neurologic: Alert and oriented X 3, normal motor function, normal sensory 

function, no focal deficits noted. []


Psychologic: Affect normal, judgement normal, mood normal. []





Current Patient Data


Vital Signs





 Vital Signs








  Date Time  Temp Pulse Resp B/P (MAP) Pulse Ox O2 Delivery O2 Flow Rate FiO2


 


10/29/17 08:31  72 17 172/77 (108) 99 Room Air  


 


10/29/17 06:42 97.8       





 97.8       








Lab Values





 Laboratory Tests








Test


  10/29/17


06:44 10/29/17


07:10 10/29/17


07:30


 


Influenza Type A Antigen


  Negative


(NEGATIVE) 


  


 


 


Influenza Type B Antigen


  Negative


(NEGATIVE) 


  


 


 


Urine Collection Type  Unknown   


 


Urine Color  Yellow   


 


Urine Clarity  Cloudy   


 


Urine pH  8.0   


 


Urine Specific Gravity  1.015   


 


Urine Protein


  


  Negative mg/dL


(NEG-TRACE) 


 


 


Urine Glucose (UA)


  


  Negative mg/dL


(NEG) 


 


 


Urine Ketones (Stick)


  


  Negative mg/dL


(NEG) 


 


 


Urine Blood


  


  Negative (NEG)


  


 


 


Urine Nitrite


  


  Negative (NEG)


  


 


 


Urine Bilirubin


  


  Negative (NEG)


  


 


 


Urine Urobilinogen Dipstick


  


  1.0 mg/dL (0.2


mg/dL) 


 


 


Urine Leukocyte Esterase


  


  Negative (NEG)


  


 


 


Urine RBC


  


  Occ /HPF (0-2)


  


 


 


Urine WBC


  


  1-4 /HPF (0-4)


  


 


 


Urine Squamous Epithelial


Cells 


  Mod /LPF  


  


 


 


Urine Bacteria


  


  0 /HPF (0-FEW)


  


 


 


White Blood Count


  


  


  5.4 x10^3/uL


(4.0-11.0)


 


Red Blood Count


  


  


  3.50 x10^6/uL


(3.50-5.40)


 


Hemoglobin


  


  


  10.3 g/dL


(12.0-15.5)  L


 


Hematocrit


  


  


  32.3 %


(36.0-47.0)  L


 


Mean Corpuscular Volume


  


  


  92 fL ()


 


 


Mean Corpuscular Hemoglobin   30 pg (25-35)  


 


Mean Corpuscular Hemoglobin


Concent 


  


  32 g/dL


(31-37)


 


Red Cell Distribution Width


  


  


  14.3 %


(11.5-14.5)


 


Platelet Count


  


  


  329 x10^3/uL


(140-400)


 


Neutrophils (%) (Auto)   53 % (31-73)  


 


Lymphocytes (%) (Auto)   33 % (24-48)  


 


Monocytes (%) (Auto)   10 % (0-9)  H


 


Eosinophils (%) (Auto)   3 % (0-3)  


 


Basophils (%) (Auto)   1 % (0-3)  


 


Neutrophils # (Auto)


  


  


  2.9 x10^3uL


(1.8-7.7)


 


Lymphocytes # (Auto)


  


  


  1.8 x10^3/uL


(1.0-4.8)


 


Monocytes # (Auto)


  


  


  0.6 x10^3/uL


(0.0-1.1)


 


Eosinophils # (Auto)


  


  


  0.2 x10^3/uL


(0.0-0.7)


 


Basophils # (Auto)


  


  


  0.0 x10^3/uL


(0.0-0.2)


 


Sodium Level


  


  


  144 mmol/L


(136-145)


 


Potassium Level


  


  


  4.4 mmol/L


(3.5-5.1)


 


Chloride Level


  


  


  109 mmol/L


()  H


 


Carbon Dioxide Level


  


  


  28 mmol/L


(21-32)


 


Anion Gap   7 (6-14)  


 


Blood Urea Nitrogen


  


  


  16 mg/dL


(7-20)


 


Creatinine


  


  


  1.3 mg/dL


(0.6-1.0)  H


 


Estimated GFR


(Cockcroft-Gault) 


  


  49.0  


 


 


Glucose Level


  


  


  96 mg/dL


(70-99)


 


Lactic Acid Level


  


  


  1.1 mmol/L


(0.4-2.0)


 


Calcium Level


  


  


  8.9 mg/dL


(8.5-10.1)


 


Total Bilirubin


  


  


  0.1 mg/dL


(0.2-1.0)  L


 


Direct Bilirubin


  


  


  < 0.1 mg/dL


(0.0-0.2)


 


Aspartate Amino Transferase


(AST) 


  


  18 U/L (15-37)


 


 


Alanine Aminotransferase (ALT)


  


  


  17 U/L (14-59)


 


 


Alkaline Phosphatase


  


  


  272 U/L


()  H


 


Troponin I Quantitative


  


  


  < 0.017 ng/mL


(0.000-0.055)


 


NT-Pro-B-Type Natriuretic


Peptide 


  


  232 pg/mL


(0-124)  H


 


Total Protein


  


  


  7.3 g/dL


(6.4-8.2)


 


Albumin


  


  


  3.5 g/dL


(3.4-5.0)


 


Lipase


  


  


  166 U/L


()





 Laboratory Tests


10/29/17 07:30








 Laboratory Tests


10/29/17 07:30














EKG


EKG


[]





Radiology/Procedures


Radiology/Procedures


[]





Course & Med Decision Making


Course & Med Decision Making


Pertinent Labs and Imaging studies reviewed. (See chart for details)





[]





Dragon Disclaimer


Dragon Disclaimer


This electronic medical record was generated, in whole or in part, using a 

voice recognition dictation system.





Departure


Departure


Impression:  


 Primary Impression:  


 Body aches


 Additional Impression:  


 Fatigue


Disposition:  01 HOME, SELF-CARE


Condition:  STABLE


Referrals:  


MIN DUMONT MD (PCP)


Patient Instructions:  Myalgia, Adult





Additional Instructions:  


Thank you for allowing us to participate in your care today.





Followup with your primary care physician in 3 days if your symptoms do not 

improve.  Call your Primary Doctor tomorrow and inform them of your visit 

today.  If you do not have a primary care provider you can ask for a list of 

our primary care providers. Return to the emergency department you have any new 

or concerning findings.





This should be evaluated by the primary care physician and any necessary 

consulting services for continued management within a few days after discharge. 

Return to emergency room if you have any  new or concerning symptoms including 

but not limited to fever, chills, nausea, vomiting, intractable pain, any new 

rashes, chest pain, shortness of air, uncontrolled bleeding, difficulty 

breathing, and/or vision loss.





Problem Qualifiers











MERLENE WAYNE MD Oct 29, 2017 07:45

## 2017-10-29 NOTE — EKG
[facility]

               [address] [place] [address]

Test Date:    2017-10-29               Test Time:    07:36:19

Pat Name:     BRANDON GUERRERO            Department:   

Patient ID:   [id number]           Room:          

Gender:       F                        Technician:   

:          1945               Requested By: MERLENE WAYNE

Order Number: [id number]            Reading MD:   Yuliet White

                                 Measurements

Intervals                              Axis          

Rate:         70                       P:            47

AL:           178                      QRS:          21

QRSD:         90                       T:            49

QT:           394                                    

QTc:          428                                    

                           Interpretive Statements

SINUS RHYTHM

NORMAL ECG



Electronically Signed On 10- 17:12:20 CDT by Yuliet White

## 2017-12-20 ENCOUNTER — HOSPITAL ENCOUNTER (OUTPATIENT)
Dept: HOSPITAL 63 - SURG | Age: 72
Discharge: HOME | End: 2017-12-20
Attending: ANESTHESIOLOGY
Payer: MEDICARE

## 2017-12-20 VITALS — BODY MASS INDEX: 34.02 KG/M2 | HEIGHT: 63 IN | WEIGHT: 192 LBS

## 2017-12-20 DIAGNOSIS — Z88.8: ICD-10-CM

## 2017-12-20 DIAGNOSIS — K21.9: ICD-10-CM

## 2017-12-20 DIAGNOSIS — E78.5: ICD-10-CM

## 2017-12-20 DIAGNOSIS — Z88.2: ICD-10-CM

## 2017-12-20 DIAGNOSIS — M19.90: ICD-10-CM

## 2017-12-20 DIAGNOSIS — Z88.6: ICD-10-CM

## 2017-12-20 DIAGNOSIS — I10: ICD-10-CM

## 2017-12-20 DIAGNOSIS — M48.062: Primary | ICD-10-CM

## 2017-12-20 LAB
APTT PPP: YELLOW S
BACTERIA #/AREA URNS HPF: 0 /HPF
BASOPHILS # BLD AUTO: 0 X10^3/UL (ref 0–0.2)
BASOPHILS NFR BLD: 1 % (ref 0–3)
BILIRUB UR QL STRIP: (no result)
EOSINOPHIL NFR BLD: 0.2 X10^3/UL (ref 0–0.7)
EOSINOPHIL NFR BLD: 4 % (ref 0–3)
ERYTHROCYTE [DISTWIDTH] IN BLOOD BY AUTOMATED COUNT: 14.9 % (ref 11.5–14.5)
FIBRINOGEN PPP-MCNC: (no result) MG/DL
GLUCOSE UR STRIP-MCNC: (no result) MG/DL
HCT VFR BLD CALC: 32.9 % (ref 36–47)
HGB BLD-MCNC: 10.7 G/DL (ref 12–15.5)
LYMPHOCYTES # BLD: 1.4 X10^3/UL (ref 1–4.8)
LYMPHOCYTES NFR BLD AUTO: 28 % (ref 24–48)
MCH RBC QN AUTO: 29 PG (ref 25–35)
MCHC RBC AUTO-ENTMCNC: 33 G/DL (ref 31–37)
MCV RBC AUTO: 89 FL (ref 79–100)
MONO #: 0.4 X10^3/UL (ref 0–1.1)
MONOCYTES NFR BLD: 9 % (ref 0–9)
NEUT #: 3 X10^3UL (ref 1.8–7.7)
NEUTROPHILS NFR BLD AUTO: 59 % (ref 31–73)
NITRITE UR QL STRIP: (no result)
PLATELET # BLD AUTO: 339 X10^3/UL (ref 140–400)
RBC # BLD AUTO: 3.71 X10^6/UL (ref 3.5–5.4)
RBC #/AREA URNS HPF: 0 /HPF (ref 0–2)
SP GR UR STRIP: 1.01
SQUAMOUS #/AREA URNS LPF: (no result) /LPF
UROBILINOGEN UR-MCNC: 0.2 MG/DL
WBC # BLD AUTO: 5.1 X10^3/UL (ref 4–11)
WBC #/AREA URNS HPF: (no result) /HPF (ref 0–4)

## 2017-12-20 PROCEDURE — 81001 URINALYSIS AUTO W/SCOPE: CPT

## 2017-12-20 PROCEDURE — 85025 COMPLETE CBC W/AUTO DIFF WBC: CPT

## 2017-12-20 PROCEDURE — 22867 INSJ STABLJ DEV W/DCMPRN: CPT

## 2017-12-20 PROCEDURE — 36415 COLL VENOUS BLD VENIPUNCTURE: CPT

## 2017-12-20 PROCEDURE — 0275T: CPT

## 2018-02-03 ENCOUNTER — HOSPITAL ENCOUNTER (EMERGENCY)
Dept: HOSPITAL 61 - ER | Age: 73
Discharge: HOME | End: 2018-02-03
Payer: MEDICARE

## 2018-02-03 DIAGNOSIS — I10: ICD-10-CM

## 2018-02-03 DIAGNOSIS — I25.10: ICD-10-CM

## 2018-02-03 DIAGNOSIS — Z86.718: ICD-10-CM

## 2018-02-03 DIAGNOSIS — Z88.6: ICD-10-CM

## 2018-02-03 DIAGNOSIS — Z88.8: ICD-10-CM

## 2018-02-03 DIAGNOSIS — G89.29: Primary | ICD-10-CM

## 2018-02-03 DIAGNOSIS — Z88.5: ICD-10-CM

## 2018-02-03 DIAGNOSIS — M54.9: ICD-10-CM

## 2018-02-03 LAB
ADD MAN DIFF?: NO
ALBUMIN SERPL-MCNC: 3.7 G/DL (ref 3.4–5)
ALBUMIN/GLOB SERPL: 1.4 {RATIO} (ref 1–1.7)
ALP SERPL-CCNC: 295 U/L (ref 46–116)
ALT (SGPT): 18 U/L (ref 14–59)
ANION GAP SERPL CALC-SCNC: 11 MMOL/L (ref 6–14)
AST SERPL-CCNC: 11 U/L (ref 15–37)
BACTERIA,URINE: 0 /HPF
BASO #: 0 X10^3/UL (ref 0–0.2)
BASO %: 1 % (ref 0–3)
BILIRUBIN,URINE: NEGATIVE
BLOOD UREA NITROGEN: 15 MG/DL (ref 7–20)
BUN/CREAT SERPL: 19 (ref 6–20)
CALCIUM: 8.9 MG/DL (ref 8.5–10.1)
CHLORIDE: 110 MMOL/L (ref 98–107)
CLARITY,URINE: CLEAR
CO2 SERPL-SCNC: 23 MMOL/L (ref 21–32)
COLOR,URINE: YELLOW
CREAT SERPL-MCNC: 0.8 MG/DL (ref 0.6–1)
EOS #: 0.2 X10^3/UL (ref 0–0.7)
EOS %: 2 % (ref 0–3)
GFR SERPLBLD BASED ON 1.73 SQ M-ARVRAT: 85.8 ML/MIN
GLOBULIN SER-MCNC: 2.7 G/DL (ref 2.2–3.8)
GLUCOSE SERPL-MCNC: 97 MG/DL (ref 70–99)
GLUCOSE,URINE: NEGATIVE MG/DL
HCG SERPL-ACNC: 6.7 X10^3/UL (ref 4–11)
HEMATOCRIT: 32.6 % (ref 36–47)
HEMOGLOBIN: 10.5 G/DL (ref 12–15.5)
INFLUENZA A PATIENT: NEGATIVE
INFLUENZA B PATIENT: NEGATIVE
LYMPH #: 2.3 X10^3/UL (ref 1–4.8)
LYMPH %: 34 % (ref 24–48)
MEAN CORPUSCULAR HEMOGLOBIN: 28 PG (ref 25–35)
MEAN CORPUSCULAR HGB CONC: 32 G/DL (ref 31–37)
MEAN CORPUSCULAR VOLUME: 86 FL (ref 79–100)
MONO #: 0.6 X10^3/UL (ref 0–1.1)
MONO %: 10 % (ref 0–9)
NEUT #: 3.6 X10^3UL (ref 1.8–7.7)
NEUT %: 53 % (ref 31–73)
NITRITE,URINE: NEGATIVE
OBC FLU: (no result)
PH,URINE: 6.5
PLATELET COUNT: 401 X10^3/UL (ref 140–400)
POTASSIUM SERPL-SCNC: 3.5 MMOL/L (ref 3.5–5.1)
PROTEIN,URINE: NEGATIVE MG/DL
RBC,URINE: 0 /HPF (ref 0–2)
RED BLOOD COUNT: 3.78 X10^6/UL (ref 3.5–5.4)
RED CELL DISTRIBUTION WIDTH: 16.1 % (ref 11.5–14.5)
SODIUM: 144 MMOL/L (ref 136–145)
SPECIFIC GRAVITY,URINE: <=1.005
SQUAMOUS EPITHELIAL CELL,UR: (no result) /LPF
TOTAL BILIRUBIN: 0.2 MG/DL (ref 0.2–1)
TOTAL PROTEIN: 6.4 G/DL (ref 6.4–8.2)
TROPONINI: < 0.017 NG/ML (ref 0–0.06)
UROBILINOGEN,URINE: 0.2 MG/DL
WBC,URINE: 0 /HPF (ref 0–4)

## 2018-02-03 PROCEDURE — 84484 ASSAY OF TROPONIN QUANT: CPT

## 2018-02-03 PROCEDURE — 81001 URINALYSIS AUTO W/SCOPE: CPT

## 2018-02-03 PROCEDURE — 99285 EMERGENCY DEPT VISIT HI MDM: CPT

## 2018-02-03 PROCEDURE — 85025 COMPLETE CBC W/AUTO DIFF WBC: CPT

## 2018-02-03 PROCEDURE — 71046 X-RAY EXAM CHEST 2 VIEWS: CPT

## 2018-02-03 PROCEDURE — 36415 COLL VENOUS BLD VENIPUNCTURE: CPT

## 2018-02-03 PROCEDURE — 80053 COMPREHEN METABOLIC PANEL: CPT

## 2018-02-03 PROCEDURE — 87804 INFLUENZA ASSAY W/OPTIC: CPT

## 2018-02-03 PROCEDURE — 93005 ELECTROCARDIOGRAM TRACING: CPT

## 2018-03-15 ENCOUNTER — HOSPITAL ENCOUNTER (INPATIENT)
Dept: HOSPITAL 61 - ER | Age: 73
LOS: 2 days | Discharge: HOME | DRG: 384 | End: 2018-03-17
Attending: FAMILY MEDICINE | Admitting: FAMILY MEDICINE
Payer: MEDICARE

## 2018-03-15 DIAGNOSIS — G89.29: ICD-10-CM

## 2018-03-15 DIAGNOSIS — K29.70: ICD-10-CM

## 2018-03-15 DIAGNOSIS — Z90.49: ICD-10-CM

## 2018-03-15 DIAGNOSIS — Z88.4: ICD-10-CM

## 2018-03-15 DIAGNOSIS — Z88.5: ICD-10-CM

## 2018-03-15 DIAGNOSIS — D50.9: ICD-10-CM

## 2018-03-15 DIAGNOSIS — Z87.11: ICD-10-CM

## 2018-03-15 DIAGNOSIS — Z98.1: ICD-10-CM

## 2018-03-15 DIAGNOSIS — Z95.5: ICD-10-CM

## 2018-03-15 DIAGNOSIS — K25.9: Primary | ICD-10-CM

## 2018-03-15 DIAGNOSIS — K44.9: ICD-10-CM

## 2018-03-15 DIAGNOSIS — Z80.0: ICD-10-CM

## 2018-03-15 DIAGNOSIS — I10: ICD-10-CM

## 2018-03-15 DIAGNOSIS — N20.0: ICD-10-CM

## 2018-03-15 DIAGNOSIS — I25.10: ICD-10-CM

## 2018-03-15 DIAGNOSIS — Z88.1: ICD-10-CM

## 2018-03-15 DIAGNOSIS — Z88.8: ICD-10-CM

## 2018-03-15 LAB
ADD MAN DIFF?: NO
ALBUMIN SERPL-MCNC: 3.9 G/DL (ref 3.4–5)
ALBUMIN/GLOB SERPL: 1.1 {RATIO} (ref 1–1.7)
ALP SERPL-CCNC: 337 U/L (ref 46–116)
ALT (SGPT): 19 U/L (ref 14–59)
AMPHETAMINE/METHAMPHETAMINE: (no result)
ANION GAP SERPL CALC-SCNC: 5 MMOL/L (ref 6–14)
AST SERPL-CCNC: 14 U/L (ref 15–37)
BACTERIA #/AREA URNS HPF: 0 /HPF
BARBITURATES: (no result)
BASO #: 0 X10^3/UL (ref 0–0.2)
BASO %: 1 % (ref 0–3)
BENZODIAZEPINES: (no result)
BILIRUB DIRECT SERPL-MCNC: 0.1 MG/DL (ref 0–0.2)
BILIRUBIN,URINE: NEGATIVE
BLOOD UREA NITROGEN: 15 MG/DL (ref 7–20)
BUN/CREAT SERPL: 14 (ref 6–20)
CALCIUM: 9.1 MG/DL (ref 8.5–10.1)
CANNABINOIDS: (no result)
CHLORIDE: 104 MMOL/L (ref 98–107)
CK SERPL-CCNC: 57 U/L (ref 26–192)
CKMB INDEX: (no result) % (ref 0–4)
CKMB MASS: 0.8 NG/ML (ref 0–3.6)
CLARITY,URINE: CLEAR
CO2 SERPL-SCNC: 28 MMOL/L (ref 21–32)
COCAINE: (no result)
COLOR,URINE: YELLOW
CREAT SERPL-MCNC: 1.1 MG/DL (ref 0.6–1)
EOS #: 0.1 X10^3/UL (ref 0–0.7)
EOS %: 3 % (ref 0–3)
ETHANOL, URINE: (no result)
FECAL OB PT: NEGATIVE
GFR SERPLBLD BASED ON 1.73 SQ M-ARVRAT: 59.4 ML/MIN
GLOBULIN SER-MCNC: 3.5 G/DL (ref 2.2–3.8)
GLUCOSE SERPL-MCNC: 113 MG/DL (ref 70–99)
GLUCOSE,URINE: NEGATIVE MG/DL
HCG SERPL-ACNC: 5.8 X10^3/UL (ref 4–11)
HEMATOCRIT: 29.6 % (ref 36–47)
HEMOGLOBIN: 9.5 G/DL (ref 12–15.5)
INR: 1 (ref 0.8–1.1)
LACTIC ACID: 1.3 MMOL/L (ref 0.4–2)
LIPASE: 111 U/L (ref 73–393)
LYMPH #: 1.4 X10^3/UL (ref 1–4.8)
LYMPH %: 24 % (ref 24–48)
MAGNESIUM: 2.7 MG/DL (ref 1.8–2.4)
MEAN CORPUSCULAR HEMOGLOBIN: 26 PG (ref 25–35)
MEAN CORPUSCULAR HGB CONC: 32 G/DL (ref 31–37)
MEAN CORPUSCULAR VOLUME: 81 FL (ref 79–100)
METHADONE: (no result)
MONO #: 0.7 X10^3/UL (ref 0–1.1)
MONO %: 12 % (ref 0–9)
NEG OBC FOB: (no result)
NEUT #: 3.6 X10^3UL (ref 1.8–7.7)
NEUT %: 62 % (ref 31–73)
NITRITE UR QL STRIP: NEGATIVE
NT-PRO BNP: 62 PG/ML (ref 0–124)
OPIATES: (no result)
PH,URINE: 8
PHENCYCLIDINE: (no result)
PLATELET COUNT: 487 X10^3/UL (ref 140–400)
POS OBC FOB: (no result)
POTASSIUM SERPL-SCNC: 4.6 MMOL/L (ref 3.5–5.1)
PROTEIN,URINE: NEGATIVE MG/DL
PROTHROMBIN TIME PATIENT: 12.8 SEC (ref 11.7–14)
RBC,URINE: 0 /HPF (ref 0–2)
RED BLOOD COUNT: 3.68 X10^6/UL (ref 3.5–5.4)
RED CELL DISTRIBUTION WIDTH: 16.9 % (ref 11.5–14.5)
SODIUM: 137 MMOL/L (ref 136–145)
SPECIFIC GRAVITY,URINE: 1.01
SQUAMOUS EPITHELIAL CELL,UR: (no result) /LPF
TOTAL BILIRUBIN: 0.3 MG/DL (ref 0.2–1)
TOTAL PROTEIN: 7.4 G/DL (ref 6.4–8.2)
TROPONINI: < 0.017 NG/ML (ref 0–0.06)
UROBILINOGEN,URINE: 0.2 MG/DL
WBC #/AREA URNS HPF: 0 /HPF (ref 0–4)

## 2018-03-15 PROCEDURE — 80053 COMPREHEN METABOLIC PANEL: CPT

## 2018-03-15 PROCEDURE — 36415 COLL VENOUS BLD VENIPUNCTURE: CPT

## 2018-03-15 PROCEDURE — 83550 IRON BINDING TEST: CPT

## 2018-03-15 PROCEDURE — 82728 ASSAY OF FERRITIN: CPT

## 2018-03-15 PROCEDURE — 80076 HEPATIC FUNCTION PANEL: CPT

## 2018-03-15 PROCEDURE — 88305 TISSUE EXAM BY PATHOLOGIST: CPT

## 2018-03-15 PROCEDURE — 84484 ASSAY OF TROPONIN QUANT: CPT

## 2018-03-15 PROCEDURE — 83605 ASSAY OF LACTIC ACID: CPT

## 2018-03-15 PROCEDURE — 85025 COMPLETE CBC W/AUTO DIFF WBC: CPT

## 2018-03-15 PROCEDURE — 80307 DRUG TEST PRSMV CHEM ANLYZR: CPT

## 2018-03-15 PROCEDURE — 80048 BASIC METABOLIC PNL TOTAL CA: CPT

## 2018-03-15 PROCEDURE — 76705 ECHO EXAM OF ABDOMEN: CPT

## 2018-03-15 PROCEDURE — 83735 ASSAY OF MAGNESIUM: CPT

## 2018-03-15 PROCEDURE — 88342 IMHCHEM/IMCYTCHM 1ST ANTB: CPT

## 2018-03-15 PROCEDURE — 83010 ASSAY OF HAPTOGLOBIN QUANT: CPT

## 2018-03-15 PROCEDURE — 83540 ASSAY OF IRON: CPT

## 2018-03-15 PROCEDURE — 82746 ASSAY OF FOLIC ACID SERUM: CPT

## 2018-03-15 PROCEDURE — 82553 CREATINE MB FRACTION: CPT

## 2018-03-15 PROCEDURE — 82607 VITAMIN B-12: CPT

## 2018-03-15 PROCEDURE — 83880 ASSAY OF NATRIURETIC PEPTIDE: CPT

## 2018-03-15 PROCEDURE — 85610 PROTHROMBIN TIME: CPT

## 2018-03-15 PROCEDURE — 82274 ASSAY TEST FOR BLOOD FECAL: CPT

## 2018-03-15 PROCEDURE — 85045 AUTOMATED RETICULOCYTE COUNT: CPT

## 2018-03-15 PROCEDURE — 93005 ELECTROCARDIOGRAM TRACING: CPT

## 2018-03-15 PROCEDURE — 81001 URINALYSIS AUTO W/SCOPE: CPT

## 2018-03-15 PROCEDURE — 83690 ASSAY OF LIPASE: CPT

## 2018-03-15 PROCEDURE — 82150 ASSAY OF AMYLASE: CPT

## 2018-03-15 RX ADMIN — BACITRACIN 1 MLS/HR: 5000 INJECTION, POWDER, FOR SOLUTION INTRAMUSCULAR at 17:43

## 2018-03-16 LAB
% SAT IRON: 4 % (ref 15–34)
ADD MAN DIFF?: NO
AMYLASE: 33 U/L (ref 25–115)
ANION GAP SERPL CALC-SCNC: 8 MMOL/L (ref 6–14)
BASO #: 0 X10^3/UL (ref 0–0.2)
BASO %: 1 % (ref 0–3)
BLOOD UREA NITROGEN: 11 MG/DL (ref 7–20)
CALCIUM: 9.1 MG/DL (ref 8.5–10.1)
CHLORIDE: 106 MMOL/L (ref 98–107)
CO2 SERPL-SCNC: 26 MMOL/L (ref 21–32)
CREAT SERPL-MCNC: 1 MG/DL (ref 0.6–1)
EOS #: 0.2 X10^3/UL (ref 0–0.7)
EOS %: 3 % (ref 0–3)
FERRITIN: 10 NG/ML (ref 8–252)
FOLATE: 10.9 NG/ML (ref 3.2–20)
GFR SERPLBLD BASED ON 1.73 SQ M-ARVRAT: 66.3 ML/MIN
GLUCOSE SERPL-MCNC: 91 MG/DL (ref 70–99)
HCG SERPL-ACNC: 5.8 X10^3/UL (ref 4–11)
HEMATOCRIT: 29.5 % (ref 36–47)
HEMOGLOBIN: 9.2 G/DL (ref 12–15.5)
IRON,SERUM: 21 UG/DL (ref 50–170)
LIPASE: 100 U/L (ref 73–393)
LYMPH #: 1.9 X10^3/UL (ref 1–4.8)
LYMPH %: 32 % (ref 24–48)
MEAN CORPUSCULAR HEMOGLOBIN: 25 PG (ref 25–35)
MEAN CORPUSCULAR HGB CONC: 31 G/DL (ref 31–37)
MEAN CORPUSCULAR VOLUME: 81 FL (ref 79–100)
MONO #: 0.6 X10^3/UL (ref 0–1.1)
MONO %: 11 % (ref 0–9)
NEUT #: 3 X10^3UL (ref 1.8–7.7)
NEUT %: 53 % (ref 31–73)
PLATELET COUNT: 429 X10^3/UL (ref 140–400)
POTASSIUM SERPL-SCNC: 4.2 MMOL/L (ref 3.5–5.1)
RED BLOOD COUNT: 3.64 X10^6/UL (ref 3.5–5.4)
RED CELL DISTRIBUTION WIDTH: 16.5 % (ref 11.5–14.5)
RETIC COUNT: 1.5 % (ref 0.5–2.5)
SODIUM: 140 MMOL/L (ref 136–145)
TROPONINI: < 0.017 NG/ML (ref 0–0.06)
TROPONINI: < 0.017 NG/ML (ref 0–0.06)
VITAMIN-B12: 1477 PG/ML (ref 247–911)

## 2018-03-16 PROCEDURE — 0DB68ZX EXCISION OF STOMACH, VIA NATURAL OR ARTIFICIAL OPENING ENDOSCOPIC, DIAGNOSTIC: ICD-10-PCS

## 2018-03-16 RX ADMIN — HYDROCODONE BITARTRATE AND ACETAMINOPHEN 1 TAB: 5; 325 TABLET ORAL at 12:51

## 2018-03-16 RX ADMIN — CYCLOBENZAPRINE HYDROCHLORIDE 1 MG: 10 TABLET, FILM COATED ORAL at 14:00

## 2018-03-16 RX ADMIN — PANTOPRAZOLE SODIUM 1 MG: 40 TABLET, DELAYED RELEASE ORAL at 12:51

## 2018-03-16 RX ADMIN — SODIUM CHLORIDE, SODIUM LACTATE, POTASSIUM CHLORIDE, AND CALCIUM CHLORIDE 1 MLS/HR: .6; .31; .03; .02 INJECTION, SOLUTION INTRAVENOUS at 10:13

## 2018-03-16 RX ADMIN — CYCLOBENZAPRINE HYDROCHLORIDE 1 MG: 10 TABLET, FILM COATED ORAL at 09:00

## 2018-03-16 RX ADMIN — LISINOPRIL 1 MG: 10 TABLET ORAL at 12:51

## 2018-03-16 RX ADMIN — CYCLOBENZAPRINE HYDROCHLORIDE 1 MG: 10 TABLET, FILM COATED ORAL at 21:00

## 2018-03-17 LAB
ADD MAN DIFF?: NO
ALBUMIN SERPL-MCNC: 3.8 G/DL (ref 3.4–5)
ALBUMIN/GLOB SERPL: 1.1 {RATIO} (ref 1–1.7)
ALP SERPL-CCNC: 336 U/L (ref 46–116)
ALT (SGPT): 18 U/L (ref 14–59)
ANION GAP SERPL CALC-SCNC: 9 MMOL/L (ref 6–14)
AST SERPL-CCNC: 12 U/L (ref 15–37)
BASO #: 0 X10^3/UL (ref 0–0.2)
BASO %: 1 % (ref 0–3)
BLOOD UREA NITROGEN: 12 MG/DL (ref 7–20)
BUN/CREAT SERPL: 11 (ref 6–20)
CALCIUM: 9.4 MG/DL (ref 8.5–10.1)
CHLORIDE: 104 MMOL/L (ref 98–107)
CO2 SERPL-SCNC: 25 MMOL/L (ref 21–32)
CREAT SERPL-MCNC: 1.1 MG/DL (ref 0.6–1)
EOS #: 0.2 X10^3/UL (ref 0–0.7)
EOS %: 3 % (ref 0–3)
GFR SERPLBLD BASED ON 1.73 SQ M-ARVRAT: 59.4 ML/MIN
GLOBULIN SER-MCNC: 3.6 G/DL (ref 2.2–3.8)
GLUCOSE SERPL-MCNC: 117 MG/DL (ref 70–99)
HAPTOGLOBIN: 161 MG/DL (ref 34–200)
HCG SERPL-ACNC: 6.2 X10^3/UL (ref 4–11)
HEMATOCRIT: 30.9 % (ref 36–47)
HEMOGLOBIN: 9.8 G/DL (ref 12–15.5)
LYMPH #: 2.1 X10^3/UL (ref 1–4.8)
LYMPH %: 35 % (ref 24–48)
MEAN CORPUSCULAR HEMOGLOBIN: 26 PG (ref 25–35)
MEAN CORPUSCULAR HGB CONC: 32 G/DL (ref 31–37)
MEAN CORPUSCULAR VOLUME: 80 FL (ref 79–100)
MONO #: 0.7 X10^3/UL (ref 0–1.1)
MONO %: 12 % (ref 0–9)
NEUT #: 3.1 X10^3UL (ref 1.8–7.7)
NEUT %: 50 % (ref 31–73)
PLATELET COUNT: 523 X10^3/UL (ref 140–400)
POTASSIUM SERPL-SCNC: 4.2 MMOL/L (ref 3.5–5.1)
RED BLOOD COUNT: 3.85 X10^6/UL (ref 3.5–5.4)
RED CELL DISTRIBUTION WIDTH: 16.7 % (ref 11.5–14.5)
SODIUM: 138 MMOL/L (ref 136–145)
TOTAL BILIRUBIN: 0.2 MG/DL (ref 0.2–1)
TOTAL PROTEIN: 7.4 G/DL (ref 6.4–8.2)

## 2018-03-17 RX ADMIN — CYCLOBENZAPRINE HYDROCHLORIDE 1 MG: 10 TABLET, FILM COATED ORAL at 13:44

## 2018-03-17 RX ADMIN — SODIUM CHLORIDE, SODIUM LACTATE, POTASSIUM CHLORIDE, AND CALCIUM CHLORIDE 1 MLS/HR: .6; .31; .03; .02 INJECTION, SOLUTION INTRAVENOUS at 04:34

## 2018-03-17 RX ADMIN — LISINOPRIL 1 MG: 10 TABLET ORAL at 08:55

## 2018-03-17 RX ADMIN — SODIUM CHLORIDE, SODIUM LACTATE, POTASSIUM CHLORIDE, AND CALCIUM CHLORIDE 1 MLS/HR: .6; .31; .03; .02 INJECTION, SOLUTION INTRAVENOUS at 12:26

## 2018-03-17 RX ADMIN — PANTOPRAZOLE SODIUM 1 MG: 40 TABLET, DELAYED RELEASE ORAL at 08:09

## 2018-03-17 RX ADMIN — CYCLOBENZAPRINE HYDROCHLORIDE 1 MG: 10 TABLET, FILM COATED ORAL at 08:55

## 2018-03-29 ENCOUNTER — HOSPITAL ENCOUNTER (INPATIENT)
Dept: HOSPITAL 61 - ER | Age: 73
LOS: 2 days | Discharge: HOME | DRG: 384 | End: 2018-03-31
Attending: FAMILY MEDICINE | Admitting: FAMILY MEDICINE
Payer: MEDICARE

## 2018-03-29 DIAGNOSIS — Z95.5: ICD-10-CM

## 2018-03-29 DIAGNOSIS — I10: ICD-10-CM

## 2018-03-29 DIAGNOSIS — G89.29: ICD-10-CM

## 2018-03-29 DIAGNOSIS — E87.2: ICD-10-CM

## 2018-03-29 DIAGNOSIS — K57.30: ICD-10-CM

## 2018-03-29 DIAGNOSIS — Z87.11: ICD-10-CM

## 2018-03-29 DIAGNOSIS — K44.9: ICD-10-CM

## 2018-03-29 DIAGNOSIS — Z88.8: ICD-10-CM

## 2018-03-29 DIAGNOSIS — Z88.6: ICD-10-CM

## 2018-03-29 DIAGNOSIS — N20.0: ICD-10-CM

## 2018-03-29 DIAGNOSIS — K76.0: ICD-10-CM

## 2018-03-29 DIAGNOSIS — Z90.49: ICD-10-CM

## 2018-03-29 DIAGNOSIS — D64.9: ICD-10-CM

## 2018-03-29 DIAGNOSIS — Z98.1: ICD-10-CM

## 2018-03-29 DIAGNOSIS — M88.9: ICD-10-CM

## 2018-03-29 DIAGNOSIS — K25.9: Primary | ICD-10-CM

## 2018-03-29 DIAGNOSIS — Z88.1: ICD-10-CM

## 2018-03-29 DIAGNOSIS — I25.10: ICD-10-CM

## 2018-03-29 LAB
ADD MAN DIFF?: NO
ALBUMIN SERPL-MCNC: 4.2 G/DL (ref 3.4–5)
ALBUMIN/GLOB SERPL: 1.3 {RATIO} (ref 1–1.7)
ALP SERPL-CCNC: 375 U/L (ref 46–116)
ALT (SGPT): 21 U/L (ref 14–59)
ANION GAP SERPL CALC-SCNC: 10 MMOL/L (ref 6–14)
ANISOCYTOSIS: (no result)
AST SERPL-CCNC: 13 U/L (ref 15–37)
BACTERIA,URINE: 0 /HPF
BASO #: 0 X10^3/UL (ref 0–0.2)
BASO %: 0 % (ref 0–3)
BILIRUBIN,URINE: NEGATIVE
BLOOD UREA NITROGEN: 10 MG/DL (ref 7–20)
BUN/CREAT SERPL: 10 (ref 6–20)
CALCIUM: 9.5 MG/DL (ref 8.5–10.1)
CHLORIDE: 107 MMOL/L (ref 98–107)
CLARITY,URINE: CLEAR
CO2 SERPL-SCNC: 24 MMOL/L (ref 21–32)
COLOR,URINE: YELLOW
CREAT SERPL-MCNC: 1 MG/DL (ref 0.6–1)
EOS #: 0.1 X10^3/UL (ref 0–0.7)
EOS %: 1 % (ref 0–3)
GFR SERPLBLD BASED ON 1.73 SQ M-ARVRAT: 66.3 ML/MIN
GLOBULIN SER-MCNC: 3.3 G/DL (ref 2.2–3.8)
GLUCOSE SERPL-MCNC: 98 MG/DL (ref 70–99)
GLUCOSE,URINE: NEGATIVE MG/DL
HCG SERPL-ACNC: 6.9 X10^3/UL (ref 4–11)
HEMATOCRIT: 33.6 % (ref 36–47)
HEMOGLOBIN: 10.6 G/DL (ref 12–15.5)
INR: 1.1 (ref 0.8–1.1)
LACTIC ACID: 2.5 MMOL/L (ref 0.4–2)
LIPASE: 128 U/L (ref 73–393)
LYMPH #: 1.4 X10^3/UL (ref 1–4.8)
LYMPH %: 20 % (ref 24–48)
MEAN CORPUSCULAR HEMOGLOBIN: 26 PG (ref 25–35)
MEAN CORPUSCULAR HGB CONC: 31 G/DL (ref 31–37)
MEAN CORPUSCULAR VOLUME: 83 FL (ref 79–100)
MONO #: 0.4 X10^3/UL (ref 0–1.1)
MONO %: 6 % (ref 0–9)
NEUT #: 5 X10^3UL (ref 1.8–7.7)
NEUT %: 72 % (ref 31–73)
NITRITE,URINE: NEGATIVE
NT-PRO BNP: 170 PG/ML (ref 0–124)
PARTIAL THROMBOPLASTIN TIME: 31 SEC (ref 24–38)
PH,URINE: 6.5
PLATELET COUNT: 444 X10^3/UL (ref 140–400)
PLT ESTIMATE: (no result)
POIKILOCYTOSIS: SLIGHT
POTASSIUM SERPL-SCNC: 4.5 MMOL/L (ref 3.5–5.1)
PROTEIN,URINE: NEGATIVE MG/DL
PROTHROMBIN TIME PATIENT: 13.4 SEC (ref 11.7–14)
RBC,URINE: 0 /HPF (ref 0–2)
RED BLOOD COUNT: 4.05 X10^6/UL (ref 3.5–5.4)
RED CELL DISTRIBUTION WIDTH: 20.5 % (ref 11.5–14.5)
SODIUM: 141 MMOL/L (ref 136–145)
SPECIFIC GRAVITY,URINE: 1.01
SQUAMOUS EPITHELIAL CELL,UR: (no result) /LPF
TOTAL BILIRUBIN: 0.4 MG/DL (ref 0.2–1)
TOTAL PROTEIN: 7.5 G/DL (ref 6.4–8.2)
TROPONINI: < 0.017 NG/ML (ref 0–0.06)
UROBILINOGEN,URINE: 0.2 MG/DL
WBC,URINE: 0 /HPF (ref 0–4)

## 2018-03-29 PROCEDURE — 80053 COMPREHEN METABOLIC PANEL: CPT

## 2018-03-29 PROCEDURE — 80048 BASIC METABOLIC PNL TOTAL CA: CPT

## 2018-03-29 PROCEDURE — 83605 ASSAY OF LACTIC ACID: CPT

## 2018-03-29 PROCEDURE — 85730 THROMBOPLASTIN TIME PARTIAL: CPT

## 2018-03-29 PROCEDURE — 74174 CTA ABD&PLVS W/CONTRAST: CPT

## 2018-03-29 PROCEDURE — 87324 CLOSTRIDIUM AG IA: CPT

## 2018-03-29 PROCEDURE — 83880 ASSAY OF NATRIURETIC PEPTIDE: CPT

## 2018-03-29 PROCEDURE — 85025 COMPLETE CBC W/AUTO DIFF WBC: CPT

## 2018-03-29 PROCEDURE — 93005 ELECTROCARDIOGRAM TRACING: CPT

## 2018-03-29 PROCEDURE — 97161 PT EVAL LOW COMPLEX 20 MIN: CPT

## 2018-03-29 PROCEDURE — 74176 CT ABD & PELVIS W/O CONTRAST: CPT

## 2018-03-29 PROCEDURE — 83690 ASSAY OF LIPASE: CPT

## 2018-03-29 PROCEDURE — 81001 URINALYSIS AUTO W/SCOPE: CPT

## 2018-03-29 PROCEDURE — 96374 THER/PROPH/DIAG INJ IV PUSH: CPT

## 2018-03-29 PROCEDURE — 99285 EMERGENCY DEPT VISIT HI MDM: CPT

## 2018-03-29 PROCEDURE — 97165 OT EVAL LOW COMPLEX 30 MIN: CPT

## 2018-03-29 PROCEDURE — 84484 ASSAY OF TROPONIN QUANT: CPT

## 2018-03-29 PROCEDURE — 36415 COLL VENOUS BLD VENIPUNCTURE: CPT

## 2018-03-29 PROCEDURE — 85610 PROTHROMBIN TIME: CPT

## 2018-03-29 RX ADMIN — FENTANYL CITRATE 1 MCG: 50 INJECTION INTRAMUSCULAR; INTRAVENOUS at 22:12

## 2018-03-29 RX ADMIN — BACITRACIN 1 MLS/HR: 5000 INJECTION, POWDER, FOR SOLUTION INTRAMUSCULAR at 20:06

## 2018-03-29 RX ADMIN — ONDANSETRON 1 MG: 2 INJECTION INTRAMUSCULAR; INTRAVENOUS at 16:03

## 2018-03-29 RX ADMIN — FENTANYL CITRATE 1 MCG: 50 INJECTION INTRAMUSCULAR; INTRAVENOUS at 16:04

## 2018-03-29 RX ADMIN — IOHEXOL 1 ML: 300 INJECTION, SOLUTION INTRAVENOUS at 17:59

## 2018-03-29 RX ADMIN — BACITRACIN 1 MLS/HR: 5000 INJECTION, POWDER, FOR SOLUTION INTRAMUSCULAR at 16:03

## 2018-03-29 RX ADMIN — FENTANYL CITRATE 1 MCG: 50 INJECTION INTRAMUSCULAR; INTRAVENOUS at 20:06

## 2018-03-30 LAB
ADD MAN DIFF?: NO
ANION GAP SERPL CALC-SCNC: 10 MMOL/L (ref 6–14)
BASO #: 0 X10^3/UL (ref 0–0.2)
BASO %: 1 % (ref 0–3)
BLOOD UREA NITROGEN: 10 MG/DL (ref 7–20)
CALCIUM: 9.2 MG/DL (ref 8.5–10.1)
CHLORIDE: 110 MMOL/L (ref 98–107)
CO2 SERPL-SCNC: 23 MMOL/L (ref 21–32)
CREAT SERPL-MCNC: 1 MG/DL (ref 0.6–1)
EOS #: 0.2 X10^3/UL (ref 0–0.7)
EOS %: 3 % (ref 0–3)
GFR SERPLBLD BASED ON 1.73 SQ M-ARVRAT: 66.3 ML/MIN
GLUCOSE SERPL-MCNC: 92 MG/DL (ref 70–99)
HCG SERPL-ACNC: 6 X10^3/UL (ref 4–11)
HEMATOCRIT: 33 % (ref 36–47)
HEMOGLOBIN: 10.2 G/DL (ref 12–15.5)
LYMPH #: 1.9 X10^3/UL (ref 1–4.8)
LYMPH %: 31 % (ref 24–48)
MEAN CORPUSCULAR HEMOGLOBIN: 26 PG (ref 25–35)
MEAN CORPUSCULAR HGB CONC: 31 G/DL (ref 31–37)
MEAN CORPUSCULAR VOLUME: 83 FL (ref 79–100)
MONO #: 0.5 X10^3/UL (ref 0–1.1)
MONO %: 9 % (ref 0–9)
NEUT #: 3.4 X10^3UL (ref 1.8–7.7)
NEUT %: 57 % (ref 31–73)
PLATELET COUNT: 398 X10^3/UL (ref 140–400)
POTASSIUM SERPL-SCNC: 4 MMOL/L (ref 3.5–5.1)
RED BLOOD COUNT: 3.96 X10^6/UL (ref 3.5–5.4)
RED CELL DISTRIBUTION WIDTH: 21.2 % (ref 11.5–14.5)
SODIUM: 143 MMOL/L (ref 136–145)

## 2018-03-30 RX ADMIN — SUCRALFATE 1 GM: 1 TABLET ORAL at 16:46

## 2018-03-30 RX ADMIN — FENTANYL CITRATE 1 MCG: 50 INJECTION INTRAMUSCULAR; INTRAVENOUS at 02:08

## 2018-03-30 RX ADMIN — BACITRACIN 1 MLS/HR: 5000 INJECTION, POWDER, FOR SOLUTION INTRAMUSCULAR at 14:12

## 2018-03-30 RX ADMIN — FENTANYL CITRATE 1 MCG: 50 INJECTION INTRAMUSCULAR; INTRAVENOUS at 04:54

## 2018-03-30 RX ADMIN — PREGABALIN 1 MG: 75 CAPSULE ORAL at 21:21

## 2018-03-30 RX ADMIN — ATENOLOL 1 MG: 50 TABLET ORAL at 10:08

## 2018-03-30 RX ADMIN — PREGABALIN 1 MG: 75 CAPSULE ORAL at 09:00

## 2018-03-30 RX ADMIN — LISINOPRIL 1 MG: 10 TABLET ORAL at 10:08

## 2018-03-30 RX ADMIN — FENTANYL CITRATE 1 MCG: 50 INJECTION INTRAMUSCULAR; INTRAVENOUS at 08:23

## 2018-03-30 RX ADMIN — CYCLOBENZAPRINE HYDROCHLORIDE 1 MG: 10 TABLET, FILM COATED ORAL at 21:21

## 2018-03-30 RX ADMIN — PANTOPRAZOLE SODIUM 1 MG: 40 TABLET, DELAYED RELEASE ORAL at 16:46

## 2018-03-30 RX ADMIN — PANTOPRAZOLE SODIUM 1 MG: 40 TABLET, DELAYED RELEASE ORAL at 10:24

## 2018-03-30 RX ADMIN — CYCLOBENZAPRINE HYDROCHLORIDE 1 MG: 10 TABLET, FILM COATED ORAL at 14:12

## 2018-03-30 RX ADMIN — SUCRALFATE 1 GM: 1 TABLET ORAL at 21:21

## 2018-03-30 RX ADMIN — Medication 1 MG: at 10:05

## 2018-03-30 RX ADMIN — BACITRACIN 1 MLS/HR: 5000 INJECTION, POWDER, FOR SOLUTION INTRAMUSCULAR at 05:57

## 2018-03-30 RX ADMIN — SUCRALFATE 1 GM: 1 TABLET ORAL at 12:09

## 2018-03-30 RX ADMIN — PANTOPRAZOLE SODIUM 1 MG: 40 TABLET, DELAYED RELEASE ORAL at 10:05

## 2018-03-30 RX ADMIN — CYCLOBENZAPRINE HYDROCHLORIDE 1 MG: 10 TABLET, FILM COATED ORAL at 10:07

## 2018-03-31 RX ADMIN — PANTOPRAZOLE SODIUM 1 MG: 40 TABLET, DELAYED RELEASE ORAL at 08:03

## 2018-03-31 RX ADMIN — PREGABALIN 1 MG: 75 CAPSULE ORAL at 08:04

## 2018-03-31 RX ADMIN — SUCRALFATE 1 GM: 1 TABLET ORAL at 08:03

## 2018-03-31 RX ADMIN — SUCRALFATE 1 GM: 1 TABLET ORAL at 11:30

## 2018-03-31 RX ADMIN — LISINOPRIL 1 MG: 10 TABLET ORAL at 08:04

## 2018-03-31 RX ADMIN — Medication 1 MG: at 08:03

## 2018-03-31 RX ADMIN — CYCLOBENZAPRINE HYDROCHLORIDE 1 MG: 10 TABLET, FILM COATED ORAL at 08:03

## 2018-03-31 RX ADMIN — ATENOLOL 1 MG: 50 TABLET ORAL at 08:04

## 2018-05-04 ENCOUNTER — HOSPITAL ENCOUNTER (EMERGENCY)
Dept: HOSPITAL 61 - ER | Age: 73
Discharge: HOME | End: 2018-05-04
Payer: MEDICARE

## 2018-05-04 DIAGNOSIS — Z88.6: ICD-10-CM

## 2018-05-04 DIAGNOSIS — Z87.11: ICD-10-CM

## 2018-05-04 DIAGNOSIS — R06.00: Primary | ICD-10-CM

## 2018-05-04 DIAGNOSIS — I25.10: ICD-10-CM

## 2018-05-04 DIAGNOSIS — I10: ICD-10-CM

## 2018-05-04 DIAGNOSIS — Z88.8: ICD-10-CM

## 2018-05-04 DIAGNOSIS — Z88.5: ICD-10-CM

## 2018-05-04 LAB
% BANDS: 1 % (ref 0–9)
% LYMPHS: 13 % (ref 24–48)
% MONOS: 3 % (ref 0–10)
% SEGS: 83 % (ref 35–66)
ADD MAN DIFF?: YES
ALBUMIN SERPL-MCNC: 3.9 G/DL (ref 3.4–5)
ALBUMIN/GLOB SERPL: 1.2 {RATIO} (ref 1–1.7)
ALP SERPL-CCNC: 264 U/L (ref 46–116)
ALT (SGPT): 33 U/L (ref 14–59)
ANION GAP SERPL CALC-SCNC: 10 MMOL/L (ref 6–14)
ANISOCYTOSIS: (no result)
AST SERPL-CCNC: 18 U/L (ref 15–37)
BASO #: 0 X10^3/UL (ref 0–0.2)
BASO %: 0 % (ref 0–3)
BLOOD UREA NITROGEN: 21 MG/DL (ref 7–20)
BUN/CREAT SERPL: 18 (ref 6–20)
CALCIUM: 8.4 MG/DL (ref 8.5–10.1)
CHLORIDE: 108 MMOL/L (ref 98–107)
CO2 SERPL-SCNC: 25 MMOL/L (ref 21–32)
CREAT SERPL-MCNC: 1.2 MG/DL (ref 0.6–1)
EOS #: 0 X10^3/UL (ref 0–0.7)
EOS %: 0 % (ref 0–3)
GFR SERPLBLD BASED ON 1.73 SQ M-ARVRAT: 53.7 ML/MIN
GLOBULIN SER-MCNC: 3.2 G/DL (ref 2.2–3.8)
GLUCOSE SERPL-MCNC: 128 MG/DL (ref 70–99)
HCG SERPL-ACNC: 10.9 X10^3/UL (ref 4–11)
HEMATOCRIT: 33.6 % (ref 36–47)
HEMOGLOBIN: 10.7 G/DL (ref 12–15.5)
LYMPH #: 0.9 X10^3/UL (ref 1–4.8)
LYMPH %: 9 % (ref 24–48)
MEAN CORPUSCULAR HEMOGLOBIN: 27 PG (ref 25–35)
MEAN CORPUSCULAR HGB CONC: 32 G/DL (ref 31–37)
MEAN CORPUSCULAR VOLUME: 84 FL (ref 79–100)
MONO #: 0.4 X10^3/UL (ref 0–1.1)
MONO %: 3 % (ref 0–9)
NEUT #: 9.6 X10^3UL (ref 1.8–7.7)
NEUT %: 88 % (ref 31–73)
NT-PRO BNP: 581 PG/ML (ref 0–124)
PLATELET COUNT: 409 X10^3/UL (ref 140–400)
PLT ESTIMATE: ADEQUATE
POLYCHROMASIA: SLIGHT
POTASSIUM SERPL-SCNC: 3.8 MMOL/L (ref 3.5–5.1)
RED BLOOD COUNT: 4 X10^6/UL (ref 3.5–5.4)
RED CELL DISTRIBUTION WIDTH: 22.5 % (ref 11.5–14.5)
SODIUM: 143 MMOL/L (ref 136–145)
TOTAL BILIRUBIN: 0.2 MG/DL (ref 0.2–1)
TOTAL PROTEIN: 7.1 G/DL (ref 6.4–8.2)

## 2018-05-04 PROCEDURE — 36415 COLL VENOUS BLD VENIPUNCTURE: CPT

## 2018-05-04 PROCEDURE — 83880 ASSAY OF NATRIURETIC PEPTIDE: CPT

## 2018-05-04 PROCEDURE — 85007 BL SMEAR W/DIFF WBC COUNT: CPT

## 2018-05-04 PROCEDURE — 93005 ELECTROCARDIOGRAM TRACING: CPT

## 2018-05-04 PROCEDURE — 99285 EMERGENCY DEPT VISIT HI MDM: CPT

## 2018-05-04 PROCEDURE — 71045 X-RAY EXAM CHEST 1 VIEW: CPT

## 2018-05-04 PROCEDURE — 80053 COMPREHEN METABOLIC PANEL: CPT

## 2018-05-04 PROCEDURE — 85025 COMPLETE CBC W/AUTO DIFF WBC: CPT

## 2018-05-23 ENCOUNTER — HOSPITAL ENCOUNTER (OUTPATIENT)
Dept: HOSPITAL 63 - SURG | Age: 73
Discharge: HOME | End: 2018-05-23
Attending: ANESTHESIOLOGY
Payer: MEDICARE

## 2018-05-23 DIAGNOSIS — Z88.1: ICD-10-CM

## 2018-05-23 DIAGNOSIS — Z95.5: ICD-10-CM

## 2018-05-23 DIAGNOSIS — Z88.6: ICD-10-CM

## 2018-05-23 DIAGNOSIS — Z88.5: ICD-10-CM

## 2018-05-23 DIAGNOSIS — E78.00: ICD-10-CM

## 2018-05-23 DIAGNOSIS — I10: ICD-10-CM

## 2018-05-23 DIAGNOSIS — K21.9: ICD-10-CM

## 2018-05-23 DIAGNOSIS — Z98.890: ICD-10-CM

## 2018-05-23 DIAGNOSIS — J45.909: ICD-10-CM

## 2018-05-23 DIAGNOSIS — Z88.8: ICD-10-CM

## 2018-05-23 DIAGNOSIS — Z90.49: ICD-10-CM

## 2018-05-23 DIAGNOSIS — M54.16: Primary | ICD-10-CM

## 2018-05-23 PROCEDURE — 64484 NJX AA&/STRD TFRM EPI L/S EA: CPT

## 2018-05-23 PROCEDURE — 64483 NJX AA&/STRD TFRM EPI L/S 1: CPT

## 2018-06-05 ENCOUNTER — HOSPITAL ENCOUNTER (EMERGENCY)
Dept: HOSPITAL 61 - ER | Age: 73
Discharge: HOME | End: 2018-06-05
Payer: MEDICARE

## 2018-06-05 DIAGNOSIS — Z87.11: ICD-10-CM

## 2018-06-05 DIAGNOSIS — Z88.5: ICD-10-CM

## 2018-06-05 DIAGNOSIS — Z88.6: ICD-10-CM

## 2018-06-05 DIAGNOSIS — I10: ICD-10-CM

## 2018-06-05 DIAGNOSIS — Z88.8: ICD-10-CM

## 2018-06-05 DIAGNOSIS — M54.32: Primary | ICD-10-CM

## 2018-06-05 PROCEDURE — 99283 EMERGENCY DEPT VISIT LOW MDM: CPT

## 2018-06-05 RX ADMIN — HYDROCODONE BITARTRATE AND ACETAMINOPHEN 1 TAB: 5; 325 TABLET ORAL at 17:39

## 2018-06-26 ENCOUNTER — HOSPITAL ENCOUNTER (OUTPATIENT)
Dept: HOSPITAL 61 - RAD | Age: 73
Discharge: HOME | End: 2018-06-26
Payer: MEDICARE

## 2018-06-26 DIAGNOSIS — M51.37: Primary | ICD-10-CM

## 2018-06-26 DIAGNOSIS — M43.16: ICD-10-CM

## 2018-06-26 DIAGNOSIS — M12.88: ICD-10-CM

## 2018-06-26 PROCEDURE — 72110 X-RAY EXAM L-2 SPINE 4/>VWS: CPT

## 2018-07-09 ENCOUNTER — HOSPITAL ENCOUNTER (EMERGENCY)
Dept: HOSPITAL 61 - ER | Age: 73
Discharge: HOME | End: 2018-07-09
Payer: MEDICARE

## 2018-07-09 DIAGNOSIS — Z88.6: ICD-10-CM
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
## 2018-07-09 DIAGNOSIS — Z90.49: ICD-10-CM

## 2018-07-09 DIAGNOSIS — Z88.5: ICD-10-CM

## 2018-07-09 DIAGNOSIS — M48.061: ICD-10-CM

## 2018-07-09 DIAGNOSIS — M54.32: ICD-10-CM
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
## 2018-07-09 DIAGNOSIS — I10: ICD-10-CM

## 2018-07-09 DIAGNOSIS — I25.10: ICD-10-CM

## 2018-07-09 DIAGNOSIS — Z86.718: ICD-10-CM

## 2018-07-09 DIAGNOSIS — Z88.8: ICD-10-CM
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
## 2018-07-09 DIAGNOSIS — S32.029A: Primary | ICD-10-CM
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
## 2018-07-09 PROCEDURE — 72131 CT LUMBAR SPINE W/O DYE: CPT

## 2018-07-09 PROCEDURE — 93971 EXTREMITY STUDY: CPT

## 2018-07-09 PROCEDURE — 99284 EMERGENCY DEPT VISIT MOD MDM: CPT

## 2018-07-09 RX ADMIN — HYDROCODONE BITARTRATE AND ACETAMINOPHEN 1 TAB: 5; 325 TABLET ORAL at 07:18

## 2018-08-02 ENCOUNTER — HOSPITAL ENCOUNTER (EMERGENCY)
Dept: HOSPITAL 61 - ER | Age: 73
Discharge: HOME | End: 2018-08-02
Payer: MEDICARE

## 2018-08-02 DIAGNOSIS — I10: ICD-10-CM

## 2018-08-02 DIAGNOSIS — Z88.8: ICD-10-CM

## 2018-08-02 DIAGNOSIS — R22.42: ICD-10-CM

## 2018-08-02 DIAGNOSIS — Z88.5: ICD-10-CM

## 2018-08-02 DIAGNOSIS — Z86.718: ICD-10-CM

## 2018-08-02 DIAGNOSIS — I25.10: ICD-10-CM

## 2018-08-02 DIAGNOSIS — Z88.6: ICD-10-CM

## 2018-08-02 DIAGNOSIS — M79.605: Primary | ICD-10-CM

## 2018-08-02 DIAGNOSIS — I48.91: ICD-10-CM

## 2018-08-02 LAB
ADD MAN DIFF?: NO
ALBUMIN SERPL-MCNC: 4.1 G/DL (ref 3.4–5)
ALBUMIN/GLOB SERPL: 1.2 {RATIO} (ref 1–1.7)
ALP SERPL-CCNC: 320 U/L (ref 46–116)
ALT (SGPT): 19 U/L (ref 14–59)
ANION GAP SERPL CALC-SCNC: 12 MMOL/L (ref 6–14)
AST SERPL-CCNC: 15 U/L (ref 15–37)
BASO #: 0.1 X10^3/UL (ref 0–0.2)
BASO %: 1 % (ref 0–3)
BLOOD UREA NITROGEN: 12 MG/DL (ref 7–20)
BUN/CREAT SERPL: 11 (ref 6–20)
CALCIUM: 9.3 MG/DL (ref 8.5–10.1)
CHLORIDE: 104 MMOL/L (ref 98–107)
CO2 SERPL-SCNC: 25 MMOL/L (ref 21–32)
CREAT SERPL-MCNC: 1.1 MG/DL (ref 0.6–1)
EOS #: 0.1 X10^3/UL (ref 0–0.7)
EOS %: 2 % (ref 0–3)
GFR SERPLBLD BASED ON 1.73 SQ M-ARVRAT: 59.2 ML/MIN
GLOBULIN SER-MCNC: 3.5 G/DL (ref 2.2–3.8)
GLUCOSE SERPL-MCNC: 105 MG/DL (ref 70–99)
HCG SERPL-ACNC: 5.8 X10^3/UL (ref 4–11)
HEMATOCRIT: 29.7 % (ref 36–47)
HEMOGLOBIN: 9.5 G/DL (ref 12–15.5)
LYMPH #: 1.5 X10^3/UL (ref 1–4.8)
LYMPH %: 27 % (ref 24–48)
MAGNESIUM: 2.3 MG/DL (ref 1.8–2.4)
MEAN CORPUSCULAR HEMOGLOBIN: 25 PG (ref 25–35)
MEAN CORPUSCULAR HGB CONC: 32 G/DL (ref 31–37)
MEAN CORPUSCULAR VOLUME: 77 FL (ref 79–100)
MONO #: 0.5 X10^3/UL (ref 0–1.1)
MONO %: 8 % (ref 0–9)
NEUT #: 3.6 X10^3UL (ref 1.8–7.7)
NEUT %: 63 % (ref 31–73)
PLATELET COUNT: 539 X10^3/UL (ref 140–400)
POTASSIUM SERPL-SCNC: 3.6 MMOL/L (ref 3.5–5.1)
RED BLOOD COUNT: 3.88 X10^6/UL (ref 3.5–5.4)
RED CELL DISTRIBUTION WIDTH: 18.1 % (ref 11.5–14.5)
SODIUM: 141 MMOL/L (ref 136–145)
TOTAL BILIRUBIN: 0.3 MG/DL (ref 0.2–1)
TOTAL PROTEIN: 7.6 G/DL (ref 6.4–8.2)

## 2018-08-02 PROCEDURE — 96374 THER/PROPH/DIAG INJ IV PUSH: CPT

## 2018-08-02 PROCEDURE — 83735 ASSAY OF MAGNESIUM: CPT

## 2018-08-02 PROCEDURE — 36415 COLL VENOUS BLD VENIPUNCTURE: CPT

## 2018-08-02 PROCEDURE — 80053 COMPREHEN METABOLIC PANEL: CPT

## 2018-08-02 PROCEDURE — 93971 EXTREMITY STUDY: CPT

## 2018-08-02 PROCEDURE — 99285 EMERGENCY DEPT VISIT HI MDM: CPT

## 2018-08-02 PROCEDURE — 85025 COMPLETE CBC W/AUTO DIFF WBC: CPT

## 2018-08-02 RX ADMIN — FENTANYL CITRATE 1 MCG: 50 INJECTION INTRAMUSCULAR; INTRAVENOUS at 19:53

## 2018-08-10 ENCOUNTER — HOSPITAL ENCOUNTER (EMERGENCY)
Dept: HOSPITAL 61 - ER | Age: 73
Discharge: HOME | End: 2018-08-10
Payer: MEDICARE

## 2018-08-10 VITALS — DIASTOLIC BLOOD PRESSURE: 87 MMHG | SYSTOLIC BLOOD PRESSURE: 158 MMHG

## 2018-08-10 VITALS — BODY MASS INDEX: 32.1 KG/M2 | WEIGHT: 188 LBS | HEIGHT: 64 IN

## 2018-08-10 DIAGNOSIS — R11.0: ICD-10-CM

## 2018-08-10 DIAGNOSIS — Z86.718: ICD-10-CM

## 2018-08-10 DIAGNOSIS — Z88.8: ICD-10-CM

## 2018-08-10 DIAGNOSIS — R10.84: Primary | ICD-10-CM

## 2018-08-10 DIAGNOSIS — Z90.89: ICD-10-CM

## 2018-08-10 DIAGNOSIS — I25.10: ICD-10-CM

## 2018-08-10 DIAGNOSIS — Z88.6: ICD-10-CM

## 2018-08-10 DIAGNOSIS — I48.91: ICD-10-CM

## 2018-08-10 DIAGNOSIS — I10: ICD-10-CM

## 2018-08-10 DIAGNOSIS — R10.13: ICD-10-CM

## 2018-08-10 DIAGNOSIS — Z90.49: ICD-10-CM

## 2018-08-10 DIAGNOSIS — Z88.5: ICD-10-CM

## 2018-08-10 LAB
ALBUMIN SERPL-MCNC: 4.2 G/DL (ref 3.4–5)
ALBUMIN/GLOB SERPL: 1.4 {RATIO} (ref 1–1.7)
ALP SERPL-CCNC: 302 U/L (ref 46–116)
ALT SERPL-CCNC: 19 U/L (ref 14–59)
AMPHETAMINE/METHAMPHETAMINE: (no result)
ANION GAP SERPL CALC-SCNC: 11 MMOL/L (ref 6–14)
APTT BLD: 28 SEC (ref 24–38)
APTT PPP: YELLOW S
AST SERPL-CCNC: 11 U/L (ref 15–37)
BACTERIA #/AREA URNS HPF: 0 /HPF
BARBITURATES UR-MCNC: (no result) UG/ML
BASOPHILS # BLD AUTO: 0.1 X10^3/UL (ref 0–0.2)
BASOPHILS NFR BLD: 1 % (ref 0–3)
BENZODIAZ UR-MCNC: (no result) UG/L
BILIRUB SERPL-MCNC: 0.2 MG/DL (ref 0.2–1)
BILIRUB UR QL STRIP: NEGATIVE
BUN SERPL-MCNC: 15 MG/DL (ref 7–20)
BUN/CREAT SERPL: 15 (ref 6–20)
CALCIUM SERPL-MCNC: 9.5 MG/DL (ref 8.5–10.1)
CANNABINOIDS UR-MCNC: (no result) UG/L
CHLORIDE SERPL-SCNC: 104 MMOL/L (ref 98–107)
CK SERPL-CCNC: 86 U/L (ref 26–192)
CO2 SERPL-SCNC: 23 MMOL/L (ref 21–32)
COCAINE UR-MCNC: (no result) NG/ML
CREAT SERPL-MCNC: 1 MG/DL (ref 0.6–1)
EOSINOPHIL NFR BLD: 0.2 X10^3/UL (ref 0–0.7)
EOSINOPHIL NFR BLD: 3 % (ref 0–3)
ERYTHROCYTE [DISTWIDTH] IN BLOOD BY AUTOMATED COUNT: 18.8 % (ref 11.5–14.5)
FIBRINOGEN PPP-MCNC: CLEAR MG/DL
GFR SERPLBLD BASED ON 1.73 SQ M-ARVRAT: 66.1 ML/MIN
GLOBULIN SER-MCNC: 2.9 G/DL (ref 2.2–3.8)
GLUCOSE SERPL-MCNC: 111 MG/DL (ref 70–99)
HCT VFR BLD CALC: 30 % (ref 36–47)
HGB BLD-MCNC: 9.7 G/DL (ref 12–15.5)
LIPASE: 178 U/L (ref 73–393)
LYMPHOCYTES # BLD: 2 X10^3/UL (ref 1–4.8)
LYMPHOCYTES NFR BLD AUTO: 30 % (ref 24–48)
MCH RBC QN AUTO: 25 PG (ref 25–35)
MCHC RBC AUTO-ENTMCNC: 32 G/DL (ref 31–37)
MCV RBC AUTO: 76 FL (ref 79–100)
METHADONE SERPL-MCNC: (no result) NG/ML
MONO #: 0.6 X10^3/UL (ref 0–1.1)
MONOCYTES NFR BLD: 10 % (ref 0–9)
NEUT #: 3.9 X10^3UL (ref 1.8–7.7)
NEUTROPHILS NFR BLD AUTO: 57 % (ref 31–73)
NITRITE UR QL STRIP: NEGATIVE
OPIATES UR-MCNC: (no result) NG/ML
PCP SERPL-MCNC: (no result) MG/DL
PH UR STRIP: 5.5 [PH]
PLATELET # BLD AUTO: 506 X10^3/UL (ref 140–400)
POTASSIUM SERPL-SCNC: 3.9 MMOL/L (ref 3.5–5.1)
PROT SERPL-MCNC: 7.1 G/DL (ref 6.4–8.2)
PROT UR STRIP-MCNC: NEGATIVE MG/DL
PROTHROMBIN TIME: 13.1 SEC (ref 11.7–14)
RBC # BLD AUTO: 3.96 X10^6/UL (ref 3.5–5.4)
RBC #/AREA URNS HPF: (no result) /HPF (ref 0–2)
SODIUM SERPL-SCNC: 138 MMOL/L (ref 136–145)
SQUAMOUS #/AREA URNS LPF: (no result) /LPF
UROBILINOGEN UR-MCNC: 0.2 MG/DL
WBC # BLD AUTO: 6.8 X10^3/UL (ref 4–11)
WBC #/AREA URNS HPF: (no result) /HPF (ref 0–4)

## 2018-08-10 PROCEDURE — 85610 PROTHROMBIN TIME: CPT

## 2018-08-10 PROCEDURE — 85730 THROMBOPLASTIN TIME PARTIAL: CPT

## 2018-08-10 PROCEDURE — 93005 ELECTROCARDIOGRAM TRACING: CPT

## 2018-08-10 PROCEDURE — 87086 URINE CULTURE/COLONY COUNT: CPT

## 2018-08-10 PROCEDURE — 96375 TX/PRO/DX INJ NEW DRUG ADDON: CPT

## 2018-08-10 PROCEDURE — 80307 DRUG TEST PRSMV CHEM ANLYZR: CPT

## 2018-08-10 PROCEDURE — 82550 ASSAY OF CK (CPK): CPT

## 2018-08-10 PROCEDURE — 85025 COMPLETE CBC W/AUTO DIFF WBC: CPT

## 2018-08-10 PROCEDURE — 74176 CT ABD & PELVIS W/O CONTRAST: CPT

## 2018-08-10 PROCEDURE — G0479 DRUG TEST PRESUMP NOT OPT: HCPCS

## 2018-08-10 PROCEDURE — 36415 COLL VENOUS BLD VENIPUNCTURE: CPT

## 2018-08-10 PROCEDURE — 99285 EMERGENCY DEPT VISIT HI MDM: CPT

## 2018-08-10 PROCEDURE — 80053 COMPREHEN METABOLIC PANEL: CPT

## 2018-08-10 PROCEDURE — 71045 X-RAY EXAM CHEST 1 VIEW: CPT

## 2018-08-10 PROCEDURE — 96374 THER/PROPH/DIAG INJ IV PUSH: CPT

## 2018-08-10 PROCEDURE — 81001 URINALYSIS AUTO W/SCOPE: CPT

## 2018-08-10 PROCEDURE — 83690 ASSAY OF LIPASE: CPT

## 2018-08-10 PROCEDURE — 84484 ASSAY OF TROPONIN QUANT: CPT

## 2018-08-10 NOTE — EKG
[facility]

              [address] [place] [address]

Test Date:    2018-08-10               Test Time:    19:30:53

Pat Name:     BRANDON GUERRERO            Department:   

Patient ID:   [id number]           Room:          

Gender:       F                        Technician:   MS

:          1945               Requested By: TOMMIE COTTRELL

Order Number: [id number]            Reading MD:     

                                 Measurements

Intervals                              Axis          

Rate:         93                       P:            34

LA:           156                      QRS:          19

QRSD:         86                       T:            56

QT:           336                                    

QTc:          420                                    

                           Interpretive Statements

SINUS RHYTHM

LEFT ATRIAL ABNORMALITY

QRS(T) CONTOUR ABNORMALITY

CONSISTENT WITH ANTEROSEPTAL INFARCT

AGE UNDETERMINED

T ABNORMALITY IN HIGH LATERAL LEADS

ABNORMAL ECG

RI6.01

No previous ECG available for comparison

## 2018-08-10 NOTE — RAD
CT scan abdomen and pelvis without contrast 8/10/2018

 

CLINICAL HISTORY: Epigastric pain with nausea and vomiting.

 

TECHNIQUE: Unenhanced, contiguous, 2 mm axial sections were obtained 

through the abdomen and pelvis.

 

One or more of the following individualized dose reduction techniques were

utilized for this study:

 

1. Automated exposure control.

2. Adjustment of the mA and/or kV according to patient size.

3. Use of iterative reconstruction technique.

 

 

FINDINGS: Comparison study is dated 3/29/2018.

 

Images through the lung bases demonstrate linear bands of subsegmental 

atelectasis involving left lower lobe. There is a large hiatal hernia.

 

The liver, spleen, pancreas, and adrenal glands are within normal limits. 

A 2 cm rounded low-attenuation lesion is seen involving lower pole of the 

right kidney. This likely represents a cyst. A 4 mm nonobstructing 

calculus is seen involving the lower pole left kidney.

 

Atherosclerotic calcification abdominal aorta is seen. The abdominal aorta

tapers normally. The gallbladder is not visualized consistent with a 

cholecystectomy. No free fluid or free air is seen within the abdomen. 

There is no evidence of bowel obstruction. Air and stool is seen 

throughout the colon.

 

Images through the pelvis demonstrate the urinary bladder distended with 

urine. No adnexal mass is seen. Calcifications are seen within the pelvis 

consistent with phleboliths. No free fluid is noted. The osseous 

structures are unchanged.

 

IMPRESSION: No acute abnormality is seen.

 

Electronically signed by: Nathaniel Willingham MD (8/10/2018 8:45 PM) [facility]

## 2018-08-10 NOTE — RAD
AP portable chest  radiograph 8/10/2018

 

Clinical History: Epigastric pain for 3 days.

 

An AP erect portable digital radiograph of the chest was obtained.

 

Comparison study is dated 5/4/2018.

 

The cardiac silhouette is mildly enlarged. There is a moderate sized 

hiatal hernia. The thoracic aorta is mildly tortuous. No acute pulmonary 

infiltrate is seen. No pleural effusion or pneumothorax is noted. The 

osseous structures are unchanged.

 

Impression: No acute abnormality is seen.

 

Electronically signed by: Nathaniel Willingham MD (8/10/2018 9:50 PM) [facility]

## 2018-08-10 NOTE — PHYS DOC
Past Medical History


Past Medical History:  A-Fib, CAD, DVT, Hypertension, P.U.D.


Additional Past Medical Histor:  PEPTIC ULCER, BACK PROBLEMS


Past Surgical History:  Appendectomy, Cholecystectomy


Additional Past Surgical Histo:  LEFT GREAT TOE CORRECTION


Alcohol Use:  None


Drug Use:  None





Adult General


Chief Complaint


Chief Complaint:  ABDOMINAL PAIN





[facility]


HPI





Patient is a 73  year old female who presents with epigastric pain times off 

and on but worse today. Patient states she called her primary care Dr. Harkins 

and he stated to take an extra proton accident 30 minutes if the pain was not 

better to go to the ED. Patient states that she has not seen any blood in her 

bowel and she has not been vomiting. Patient states that she has nausea 

without. Patient states that she does not have chest pain or shortness of air. 

Patient is currently on medications for her gastric ulcers pushes Carafate, 

ranitidine, Mylanta, Protonix. Patient is also on iron pills and states that 

her bowels have some black and green color to it. Patient states today her 

bowels were little bit looser only because she  took Mylanta and every time she 

takes Mylanta and makes her bowels loose. Denies any fevers.





Review of Systems


Review of Systems





Constitutional: Denies fever or chills []


Eyes: Denies change in visual acuity, redness, or eye pain []


HENT: Denies nasal congestion or sore throat []


Respiratory: Denies cough or shortness of breath []


Cardiovascular: No additional information not addressed in HPI []


GI: Denies abdominal pain, nausea, vomiting, bloody stools or diarrhea []


: Denies dysuria or hematuria []


Musculoskeletal: Denies back pain or joint pain []


Integument: Denies rash or skin lesions []


Neurologic: Denies headache, focal weakness or sensory changes []


Endocrine: Denies polyuria or polydipsia []





All other systems were reviewed and found to be within normal limits, except as 

documented in this note.





Current Medications


Current Medications





Current Medications








 Medications


  (Trade)  Dose


 Ordered  Sig/Mayte  Start Time


 Stop Time Status Last Admin


Dose Admin


 


 Fentanyl Citrate


  (Fentanyl 2ml


 Vial)  50 mcg  1X  ONCE  8/10/18 21:15


 8/10/18 21:16 DC 8/10/18 21:39


50 MCG


 


 Multi-Ingredient


 Mouthwash/Gargle


  (Gi Cocktail)  20 ml  1X  ONCE  8/10/18 20:00


 8/10/18 20:01 DC 8/10/18 20:15


20 ML


 


 Ondansetron HCl


  (Zofran)  4 mg  1X  ONCE  8/10/18 19:45


 8/10/18 19:47 DC 8/10/18 20:15


4 MG











Allergies


Allergies





Allergies








Coded Allergies Type Severity Reaction Last Updated Verified


 


  aspirin Allergy Intermediate  17 Yes


 


  codeine Allergy Intermediate  17 Yes


 


  diazepam Allergy Intermediate  17 Yes


 


  ibuprofen Allergy Intermediate  17 Yes


 


  ketorolac Allergy Intermediate Shortness of Air 18 Yes


 


  morphine Allergy Intermediate takes ULTRAM at home 17 Yes


 


  promethazine Allergy Intermediate  17 Yes


 


Uncoded Allergies Type Severity Reaction Last Updated Verified


 


  IM SHOTS Allergy Unknown  14 











Physical Exam


Physical Exam





Constitutional: Well developed, well nourished, no acute distress, non-toxic 

appearance. []


HENT: Normocephalic, atraumatic, bilateral external ears normal, oropharynx 

moist, no oral exudates, nose normal. []


Eyes: PERRLA, EOMI, conjunctiva normal, no discharge. [] 


Neck: Normal range of motion, no tenderness, supple, no stridor. [] 


Cardiovascular:Heart rate regular rhythm, no murmur []


Lungs & Thorax:  Bilateral breath sounds clear to auscultation []


Abdomen: Bowel sounds normal, soft, no tenderness, no masses, no pulsatile 

masses. [] 


Skin: Warm, dry, no erythema, no rash. [] 


Back: No tenderness, no CVA tenderness. [] 


Extremities: No tenderness, no cyanosis, no clubbing, ROM intact, no edema. [] 


Neurologic: Alert and oriented X 3, normal motor function, normal sensory 

function, no focal deficits noted. []


Psychologic: Affect normal, judgement normal, mood normal. []





Current Patient Data


Vital Signs





 Vital Signs








  Date Time  Temp Pulse Resp B/P (MAP) Pulse Ox O2 Delivery O2 Flow Rate FiO2


 


8/10/18 21:39   18  100 Room Air  


 


8/10/18 19:25 99.5 99  191/89 (123)    





 99.5       








Lab Values





 Laboratory Tests








Test


 8/10/18


20:08 8/10/18


21:14


 


White Blood Count


 6.8 x10^3/uL


(4.0-11.0) 





 


Red Blood Count


 3.96 x10^6/uL


(3.50-5.40) 





 


Hemoglobin


 9.7 g/dL


(12.0-15.5)  L 





 


Hematocrit


 30.0 %


(36.0-47.0)  L 





 


Mean Corpuscular Volume


 76 fL ()


L 





 


Mean Corpuscular Hemoglobin 25 pg (25-35)   


 


Mean Corpuscular Hemoglobin


Concent 32 g/dL


(31-37) 





 


Red Cell Distribution Width


 18.8 %


(11.5-14.5)  H 





 


Platelet Count


 506 x10^3/uL


(140-400)  H 





 


Neutrophils (%) (Auto) 57 % (31-73)   


 


Lymphocytes (%) (Auto) 30 % (24-48)   


 


Monocytes (%) (Auto) 10 % (0-9)  H 


 


Eosinophils (%) (Auto) 3 % (0-3)   


 


Basophils (%) (Auto) 1 % (0-3)   


 


Neutrophils # (Auto)


 3.9 x10^3uL


(1.8-7.7) 





 


Lymphocytes # (Auto)


 2.0 x10^3/uL


(1.0-4.8) 





 


Monocytes # (Auto)


 0.6 x10^3/uL


(0.0-1.1) 





 


Eosinophils # (Auto)


 0.2 x10^3/uL


(0.0-0.7) 





 


Basophils # (Auto)


 0.1 x10^3/uL


(0.0-0.2) 





 


Prothrombin Time


 13.1 SEC


(11.7-14.0) 





 


Prothrombin Time INR 1.0 (0.8-1.1)   


 


PTT


 28 SEC (24-38)


 





 


Sodium Level


 138 mmol/L


(136-145) 





 


Potassium Level


 3.9 mmol/L


(3.5-5.1) 





 


Chloride Level


 104 mmol/L


() 





 


Carbon Dioxide Level


 23 mmol/L


(21-32) 





 


Anion Gap 11 (6-14)   


 


Blood Urea Nitrogen


 15 mg/dL


(7-20) 





 


Creatinine


 1.0 mg/dL


(0.6-1.0) 





 


Estimated GFR


(Cockcroft-Gault) 66.1  


 





 


BUN/Creatinine Ratio 15 (6-20)   


 


Glucose Level


 111 mg/dL


(70-99)  H 





 


Calcium Level


 9.5 mg/dL


(8.5-10.1) 





 


Total Bilirubin


 0.2 mg/dL


(0.2-1.0) 





 


Aspartate Amino Transferase


(AST) 11 U/L (15-37)


L 





 


Alanine Aminotransferase (ALT)


 19 U/L (14-59)


 





 


Alkaline Phosphatase


 302 U/L


()  H 





 


Creatine Kinase


 86 U/L


() 





 


Troponin I Quantitative


 < 0.017 ng/mL


(0.000-0.055) 





 


Total Protein


 7.1 g/dL


(6.4-8.2) 





 


Albumin


 4.2 g/dL


(3.4-5.0) 





 


Albumin/Globulin Ratio 1.4 (1.0-1.7)   


 


Lipase


 178 U/L


() 





 


Urine Collection Type  Unknown  


 


Urine Color  Yellow  


 


Urine Clarity  Clear  


 


Urine pH  5.5  


 


Urine Specific Gravity  1.015  


 


Urine Protein


 


 Negative mg/dL


(NEG-TRACE)


 


Urine Glucose (UA)


 


 Negative mg/dL


(NEG)


 


Urine Ketones (Stick)


 


 Negative mg/dL


(NEG)


 


Urine Blood  Small (NEG)  


 


Urine Nitrite


 


 Negative (NEG)





 


Urine Bilirubin


 


 Negative (NEG)





 


Urine Urobilinogen Dipstick


 


 0.2 mg/dL (0.2


mg/dL)


 


Urine Leukocyte Esterase


 


 Moderate (NEG)





 


Urine RBC


 


 6-10 /HPF


(0-2)


 


Urine WBC


 


 1-4 /HPF (0-4)





 


Urine Squamous Epithelial


Cells 


 Mod /LPF  





 


Urine Renal Epithelial Cells  Few /LPF  


 


Urine Bacteria


 


 0 /HPF (0-FEW)





 


Urine Mucus  Mod /LPF  


 


Urine Opiates Screen  Pos (NEG)  


 


Urine Methadone Screen  Neg (NEG)  


 


Urine Barbiturates  Neg (NEG)  


 


Urine Phencyclidine Screen  Neg (NEG)  


 


Urine


Amphetamine/Methamphetamine 


 Neg (NEG)  





 


Urine Benzodiazepines Screen  Neg (NEG)  


 


Urine Cocaine Screen  Neg (NEG)  


 


Urine Cannabinoids Screen  Neg (NEG)  


 


Urine Ethyl Alcohol  Neg (NEG)  





 Laboratory Tests


8/10/18 20:08








 Laboratory Tests


8/10/18 20:08











EKG


EKG


Sinus rhythm, no STEMI.[]


Interpretation Time:


Read by Dr. Bonds at 1930





Radiology/Procedures


Radiology/Procedures


Chest x ray, CT abdomen and pelvis wo contrast[]


Impressions:


[facility]


 [address]  [place], [place] [id number] [phone]


 


 IMAGING REPORT





 Signed





PATIENT: BRANDON GUERRERO ACCOUNT: [id number] MRN#: [id number]


: 1945 LOCATION: ER AGE: 73


SEX: F EXAM DT: 08/10/18 ACCESSION#: [id number]


STATUS: REG ER ORD. PHYSICIAN: TOMMIE COTTRELL 


REASON: abdominal pain


PROCEDURE: CT ABDOMEN PELVIS WO CONTRAST





CT scan abdomen and pelvis without contrast 8/10/2018


 


CLINICAL HISTORY: Epigastric pain with nausea and vomiting.


 


TECHNIQUE: Unenhanced, contiguous, 2 mm axial sections were obtained 


through the abdomen and pelvis.


 


One or more of the following individualized dose reduction techniques were


utilized for this study:


 


1. Automated exposure control.


2. Adjustment of the mA and/or kV according to patient size.


3. Use of iterative reconstruction technique.


 


 


FINDINGS: Comparison study is dated 3/29/2018.


 


Images through the lung bases demonstrate linear bands of subsegmental 


atelectasis involving left lower lobe. There is a large hiatal hernia.


 


The liver, spleen, pancreas, and adrenal glands are within normal limits. 


A 2 cm rounded low-attenuation lesion is seen involving lower pole of the 


right kidney. This likely represents a cyst. A 4 mm nonobstructing 


calculus is seen involving the lower pole left kidney.


 


Atherosclerotic calcification abdominal aorta is seen. The abdominal aorta


tapers normally. The gallbladder is not visualized consistent with a 


cholecystectomy. No free fluid or free air is seen within the abdomen. 


There is no evidence of bowel obstruction. Air and stool is seen 


throughout the colon.


 


Images through the pelvis demonstrate the urinary bladder distended with 


urine. No adnexal mass is seen. Calcifications are seen within the pelvis 


consistent with phleboliths. No free fluid is noted. The osseous 


structures are unchanged.


 


IMPRESSION: No acute abnormality is seen.


 


Electronically signed by: Nathaniel Willingham MD (8/10/2018 8:45 PM) [facility]














DICTATED and SIGNED BY:     NATHANIEL WILLINGHAM MD


DATE:     08/10/18 2041














Chest x ray with no acute findings. Read by Dr. Bonds.





Course & Med Decision Making


Course & Med Decision Making


Upon examination the patient has left and right epigastric abdominal 

tenderness. Patient states that the pain that she feels in her epigastric area 

is a burning pain just like her ulcers feel. Patient's abdomen is soft. Patient 

states she does not have chest pain or shortness of air or tingling or 

numbness. And is neurologically intact Patient states that she has not seen any 

blood in her bowel and she has not been vomiting. Patient states that she has 

nausea without. Patient states that she does not have chest pain or shortness 

of air. Patient is currently on medications for her gastric ulcers pushes 

Carafate, ranitidine, Mylanta, Protonix. Patient is also on iron pills and 

states that her bowels have some black and green color to it. Patient states 

today her bowels were little bit looser only because she  took Mylanta and 

every time she takes Mylanta and makes her bowels loose. Denies any fevers. 

Lungs are clear to auscultation. Patient's temperature today is 99 5. Patient's 

pain is an 8 out of 10. Patient's skin is pink, warm, dry. Patients EKG is 

sinus rhythm without STEMI. Patient is stable and in no respiratory distress. 

Patient is given Zofran and a GI cocktail in the ED. Patient's chest x-ray is 

read by Dr. Bonds shows no acute findings. Patient's CT of her abdomen showed 

no acute findings. CT does show a large hiatal hernia. Upon reevaluation the 

patient patient states that her pain is not any better. Patient is sitting up 

in the room holding her abdomen patient. Patient is given a Fentanyl IVP and 

states she is feeling much better. Patient denies any pain at this time. 

Patient is told that she needs to follow up with her primary care physician and 

her GI doctor. Patient is stable with her  at bedside. Patient to be 

discharged home. I have reviewed this patient with Dr Bonds and he agrees to 

this Discharge plan. 








[]





Dragon Disclaimer


Dragon Disclaimer


This electronic medical record was generated, in whole or in part, using a 

voice recognition dictation system.





Departure


Departure


Impression:  


 Primary Impression:  


 Abdominal pain


Disposition:   HOME, SELF-CARE


Condition:  STABLE


Referrals:  


MIN HARKINS MD (PCP)


Patient Instructions:  Abdominal Pain





Additional Instructions:  


Follow up with your primary care physician and gastrointestinal doctor. Take 

medications as prescribed.


Scripts


Tramadol Hcl (TRAMADOL HCL) 50 Mg Tablet


50 MG PO Q6HRS PRN for PAIN, #5 TAB


   Prov: TOMMIE COTTRELL APRN         8/10/18





Problem Qualifiers








 Primary Impression:  


 Abdominal pain


 Abdominal location:  generalized  Qualified Codes:  R10.84 - Generalized 

abdominal pain








TOMMIE COTTRELL APRN Aug 10, 2018 19:54

## 2018-09-07 ENCOUNTER — HOSPITAL ENCOUNTER (OUTPATIENT)
Dept: HOSPITAL 63 - SURG | Age: 73
Discharge: HOME | End: 2018-09-07
Attending: ANESTHESIOLOGY
Payer: MEDICARE

## 2018-09-07 DIAGNOSIS — E78.5: ICD-10-CM

## 2018-09-07 DIAGNOSIS — Z88.5: ICD-10-CM

## 2018-09-07 DIAGNOSIS — I10: ICD-10-CM

## 2018-09-07 DIAGNOSIS — Z90.49: ICD-10-CM

## 2018-09-07 DIAGNOSIS — J45.909: ICD-10-CM

## 2018-09-07 DIAGNOSIS — Z88.1: ICD-10-CM

## 2018-09-07 DIAGNOSIS — Z88.6: ICD-10-CM

## 2018-09-07 DIAGNOSIS — Z88.8: ICD-10-CM

## 2018-09-07 DIAGNOSIS — M48.07: ICD-10-CM

## 2018-09-07 DIAGNOSIS — K21.9: ICD-10-CM

## 2018-09-07 DIAGNOSIS — Z95.5: ICD-10-CM

## 2018-09-07 DIAGNOSIS — M48.061: Primary | ICD-10-CM

## 2018-09-07 DIAGNOSIS — E78.00: ICD-10-CM

## 2018-09-07 DIAGNOSIS — Z88.2: ICD-10-CM

## 2018-09-07 PROCEDURE — 99214 OFFICE O/P EST MOD 30 MIN: CPT

## 2018-10-31 ENCOUNTER — HOSPITAL ENCOUNTER (OUTPATIENT)
Dept: HOSPITAL 63 - SURG | Age: 73
Discharge: HOME | End: 2018-10-31
Attending: ANESTHESIOLOGY
Payer: COMMERCIAL

## 2018-10-31 DIAGNOSIS — Z79.899: ICD-10-CM

## 2018-10-31 DIAGNOSIS — M54.16: ICD-10-CM

## 2018-10-31 DIAGNOSIS — Z79.82: ICD-10-CM

## 2018-10-31 DIAGNOSIS — J45.909: ICD-10-CM

## 2018-10-31 DIAGNOSIS — M70.61: Primary | ICD-10-CM

## 2018-10-31 DIAGNOSIS — Z98.890: ICD-10-CM

## 2018-10-31 PROCEDURE — 20610 DRAIN/INJ JOINT/BURSA W/O US: CPT

## 2018-11-21 ENCOUNTER — HOSPITAL ENCOUNTER (EMERGENCY)
Dept: HOSPITAL 61 - ER | Age: 73
Discharge: HOME | End: 2018-11-21
Payer: MEDICARE

## 2018-11-21 VITALS — WEIGHT: 172 LBS | BODY MASS INDEX: 29.37 KG/M2 | HEIGHT: 64 IN

## 2018-11-21 VITALS — DIASTOLIC BLOOD PRESSURE: 78 MMHG | SYSTOLIC BLOOD PRESSURE: 166 MMHG

## 2018-11-21 DIAGNOSIS — K21.9: ICD-10-CM

## 2018-11-21 DIAGNOSIS — Z90.89: ICD-10-CM

## 2018-11-21 DIAGNOSIS — I48.91: ICD-10-CM

## 2018-11-21 DIAGNOSIS — Z88.5: ICD-10-CM

## 2018-11-21 DIAGNOSIS — I25.10: ICD-10-CM

## 2018-11-21 DIAGNOSIS — Z88.6: ICD-10-CM

## 2018-11-21 DIAGNOSIS — Z86.718: ICD-10-CM

## 2018-11-21 DIAGNOSIS — I10: ICD-10-CM

## 2018-11-21 DIAGNOSIS — K52.9: Primary | ICD-10-CM

## 2018-11-21 DIAGNOSIS — Z88.8: ICD-10-CM

## 2018-11-21 DIAGNOSIS — Z87.11: ICD-10-CM

## 2018-11-21 DIAGNOSIS — Z90.49: ICD-10-CM

## 2018-11-21 LAB
ALBUMIN SERPL-MCNC: 3.9 G/DL (ref 3.4–5)
ALBUMIN/GLOB SERPL: 1.1 {RATIO} (ref 1–1.7)
ALP SERPL-CCNC: 288 U/L (ref 46–116)
ALT SERPL-CCNC: 19 U/L (ref 14–59)
ANION GAP SERPL CALC-SCNC: 12 MMOL/L (ref 6–14)
ANISOCYTOSIS BLD QL SMEAR: (no result)
APTT PPP: YELLOW S
AST SERPL-CCNC: 15 U/L (ref 15–37)
BACTERIA #/AREA URNS HPF: 0 /HPF
BASOPHILS # BLD AUTO: 0 X10^3/UL (ref 0–0.2)
BASOPHILS NFR BLD: 1 % (ref 0–3)
BILIRUB SERPL-MCNC: 0.3 MG/DL (ref 0.2–1)
BILIRUB UR QL STRIP: NEGATIVE
BUN SERPL-MCNC: 9 MG/DL (ref 7–20)
BUN/CREAT SERPL: 10 (ref 6–20)
CALCIUM SERPL-MCNC: 9.3 MG/DL (ref 8.5–10.1)
CHLORIDE SERPL-SCNC: 104 MMOL/L (ref 98–107)
CO2 SERPL-SCNC: 27 MMOL/L (ref 21–32)
CREAT SERPL-MCNC: 0.9 MG/DL (ref 0.6–1)
EOSINOPHIL NFR BLD: 0.1 X10^3/UL (ref 0–0.7)
EOSINOPHIL NFR BLD: 1 % (ref 0–3)
ERYTHROCYTE [DISTWIDTH] IN BLOOD BY AUTOMATED COUNT: 25.5 % (ref 11.5–14.5)
FIBRINOGEN PPP-MCNC: CLEAR MG/DL
GFR SERPLBLD BASED ON 1.73 SQ M-ARVRAT: 74.7 ML/MIN
GLOBULIN SER-MCNC: 3.6 G/DL (ref 2.2–3.8)
GLUCOSE SERPL-MCNC: 94 MG/DL (ref 70–99)
HCT VFR BLD CALC: 33.8 % (ref 36–47)
HGB BLD-MCNC: 11.1 G/DL (ref 12–15.5)
LYMPHOCYTES # BLD: 1.1 X10^3/UL (ref 1–4.8)
LYMPHOCYTES NFR BLD AUTO: 21 % (ref 24–48)
MCH RBC QN AUTO: 26 PG (ref 25–35)
MCHC RBC AUTO-ENTMCNC: 33 G/DL (ref 31–37)
MCV RBC AUTO: 79 FL (ref 79–100)
MONO #: 0.4 X10^3/UL (ref 0–1.1)
MONOCYTES NFR BLD: 7 % (ref 0–9)
NEUT #: 3.9 X10^3UL (ref 1.8–7.7)
NEUTROPHILS NFR BLD AUTO: 71 % (ref 31–73)
NITRITE UR QL STRIP: NEGATIVE
OVALOCYTES BLD QL SMEAR: (no result)
PH UR STRIP: 6.5 [PH]
PLATELET # BLD AUTO: 387 X10^3/UL (ref 140–400)
PLATELET # BLD EST: ADEQUATE 10*3/UL
POTASSIUM SERPL-SCNC: 3.6 MMOL/L (ref 3.5–5.1)
PROT SERPL-MCNC: 7.5 G/DL (ref 6.4–8.2)
PROT UR STRIP-MCNC: NEGATIVE MG/DL
RBC # BLD AUTO: 4.26 X10^6/UL (ref 3.5–5.4)
RBC #/AREA URNS HPF: 0 /HPF (ref 0–2)
SODIUM SERPL-SCNC: 143 MMOL/L (ref 136–145)
SQUAMOUS #/AREA URNS LPF: (no result) /LPF
UROBILINOGEN UR-MCNC: 0.2 MG/DL
WBC # BLD AUTO: 5.6 X10^3/UL (ref 4–11)
WBC #/AREA URNS HPF: (no result) /HPF (ref 0–4)

## 2018-11-21 PROCEDURE — 96374 THER/PROPH/DIAG INJ IV PUSH: CPT

## 2018-11-21 PROCEDURE — 36415 COLL VENOUS BLD VENIPUNCTURE: CPT

## 2018-11-21 PROCEDURE — 99284 EMERGENCY DEPT VISIT MOD MDM: CPT

## 2018-11-21 PROCEDURE — 80053 COMPREHEN METABOLIC PANEL: CPT

## 2018-11-21 PROCEDURE — 81001 URINALYSIS AUTO W/SCOPE: CPT

## 2018-11-21 PROCEDURE — 74177 CT ABD & PELVIS W/CONTRAST: CPT

## 2018-11-21 PROCEDURE — 85025 COMPLETE CBC W/AUTO DIFF WBC: CPT

## 2018-11-21 PROCEDURE — 96375 TX/PRO/DX INJ NEW DRUG ADDON: CPT

## 2018-11-21 NOTE — RAD
CT study of the abdomen and pelvis with contrast

 

 

 

Clinical indications: Right lower quadrant pain. Diarrhea.

 

TECHNIQUE: After IV infusion of 75 cc of Optiray 300, helical CT scanning 

of abdomen and pelvis was performed. No GI contrast was administered. This

may decrease the sensitivity to detect GI tract pathology.

 

 

 

PQRS compliance Statement

 

One or more of the following individualized dose reduction techniques were

utilized for this study:

1.  Automated exposure control

2.  Adjustment of the mA and/or kV according to patient size

3.  Use of iterative reconstruction technique

 

COMPARISON: August 10, 2018.

 

FINDINGS: The liver and spleen and pancreas are unremarkable. The 

gallbladder is surgically absent. No progressive dilatation of the extra 

hepatic biliary tree is seen study. No adrenal mass is evident. There is a

cyst of the lower pole of the right kidney. No hydronephrosis or 

hydroureter is evident. There is a nonobstructing punctate stone of the 

lower pole of the left kidney. No focal aneurysmal dilatation of the 

abdominal aorta is seen. No enlarged abdominal or pelvic lymphadenopathy 

is evident. No uterine mass or fibroid is seen. No dominant ovarian cyst 

or mass is evident. There is segmental wall thickening of the colon 

involving the transverse and descending and sigmoid portions. This may be 

seen with colitis. No obstructive bowel pattern is evident. There are no 

CT findings of appendicitis. No free air or free fluid or mesenteric edema

is seen. No lung base consolidation is evident. There is trabecular 

thickening and expansion of T11 and L2 vertebral bodies which is unchanged

from the previous study and this may be seen with Paget's disease given 

the expansion. No new lytic process is seen. No new compression fracture 

is evident. 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 Since the previous study,

the patient has had L4-5 fusion. Grade 1 anterolisthesis at this level is 

stable.

 

IMPRESSION: Segmental wall thickening of the colon which may be seen with 

colitis.

 

Moderate-sized hiatal hernia.

 

Nonobstructing punctate stone of the left kidney.

 

Electronically signed by: Siddhartha Perry MD (11/21/2018 3:54 PM) 

[place] [id number]

## 2018-11-21 NOTE — PHYS DOC
Past Medical History


Past Medical History:  A-Fib, CAD, DVT, GERD, Hypertension, P.U.D.


Additional Past Medical Histor:  PEPTIC ULCER, BACK PROBLEMS


Past Surgical History:  Appendectomy, Cholecystectomy


Additional Past Surgical Histo:  LEFT GREAT TOE CORRECTION


Alcohol Use:  None


Drug Use:  None





Adult General


Chief Complaint


Chief Complaint:  ABDOMINAL PAIN





HPI


HPI





Patient is a 73  year old AA female who presents to the ER with complaints of 

RLQ abdominal pain and diarrhea today. She denies any fever, nausea, vomiting, 

back pain, or shortness of breath. She denies any food or liquid intolerance. 

Pt states that she has had both her appendix and her gallbladder removed. She 

denies any blood in her stool, dysuria, urinary frequency, or hematuria. 

Currently, she reports that her pain is an 8/10, she denies taking anything for 

pain relief prior to arrival.





Review of Systems


Review of Systems





Constitutional: Denies fever or chills []


Eyes: Denies change in visual acuity, redness, or eye pain []


HENT: Denies nasal congestion or sore throat []


Respiratory: Denies cough or shortness of breath []


Cardiovascular: No additional information not addressed in HPI []


GI: Denies abdominal pain, nausea, vomiting, bloody stools or diarrhea []


: Denies dysuria or hematuria []


Musculoskeletal: Denies back pain or joint pain []


Integument: Denies rash or skin lesions []


Neurologic: Denies headache, focal weakness or sensory changes []


Endocrine: Denies polyuria or polydipsia []





All other systems were reviewed and found to be within normal limits, except as 

documented in this note.





Current Medications


Current Medications





Current Medications








 Medications


  (Trade)  Dose


 Ordered  Sig/Mayte  Start Time


 Stop Time Status Last Admin


Dose Admin


 


 Fentanyl Citrate


  (Fentanyl 2ml


 Vial)  50 mcg  1X  ONCE  11/21/18 14:00


 11/21/18 14:01 DC 11/21/18 14:39


50 MCG


 


 Info


  (CONTRAST GIVEN


 -- Rx MONITORING)  1 each  PRN DAILY  PRN  11/21/18 14:45


 11/23/18 14:44   





 


 Iohexol


  (Omnipaque 300


 Mg/ml)  75 ml  1X  ONCE  11/21/18 14:45


 11/21/18 14:46 DC 11/21/18 14:49


75 ML


 


 Ondansetron HCl


  (Zofran)  4 mg  1X  ONCE  11/21/18 14:00


 11/21/18 14:01 DC 11/21/18 14:38


4 MG











Allergies


Allergies





Allergies








Coded Allergies Type Severity Reaction Last Updated Verified


 


  aspirin Allergy Intermediate  4/25/17 Yes


 


  codeine Allergy Intermediate  4/25/17 Yes


 


  diazepam Allergy Intermediate  4/25/17 Yes


 


  ibuprofen Allergy Intermediate  4/25/17 Yes


 


  ketorolac Allergy Intermediate Shortness of Air 8/2/18 Yes


 


  morphine Allergy Intermediate takes ULTRAM at home 8/7/17 Yes


 


  promethazine Allergy Intermediate  4/25/17 Yes


 


Uncoded Allergies Type Severity Reaction Last Updated Verified


 


  IM SHOTS Allergy Unknown  5/5/14 











Physical Exam


Physical Exam





Constitutional: Well developed, well nourished, no acute distress, non-toxic 

appearance. []


HENT: Normocephalic, atraumatic, bilateral external ears normal, oropharynx 

moist, no oral exudates, nose normal. []


Eyes: PERRLA, EOMI, conjunctiva normal, no discharge. [] 


Neck: Normal range of motion, no tenderness, supple, no stridor. [] 


Cardiovascular:Heart rate regular rhythm, no murmur []


Lungs & Thorax:  Bilateral breath sounds clear to auscultation []


Abdomen: Bowel sounds normal, soft, no tenderness, no masses, no pulsatile 

masses. [] 


Skin: Warm, dry, no erythema, no rash. [] 


Back: No tenderness, no CVA tenderness. [] 


Extremities: No tenderness, no cyanosis, no clubbing, ROM intact, no edema. [] 


Neurologic: Alert and oriented X 3, normal motor function, normal sensory 

function, no focal deficits noted. []


Psychologic: Affect normal, judgement normal, mood normal. []





Current Patient Data


Vital Signs





 Vital Signs








  Date Time  Temp Pulse Resp B/P (MAP) Pulse Ox O2 Delivery O2 Flow Rate FiO2


 


11/21/18 14:39   18  99 Room Air  


 


11/21/18 14:31  92  185/86 (119)    


 


11/21/18 12:41 98.1       





 98.1       








Lab Values





 Laboratory Tests








Test


 11/21/18


13:10


 


White Blood Count


 5.6 x10^3/uL


(4.0-11.0)


 


Red Blood Count


 4.26 x10^6/uL


(3.50-5.40)


 


Hemoglobin


 11.1 g/dL


(12.0-15.5)  L


 


Hematocrit


 33.8 %


(36.0-47.0)  L


 


Mean Corpuscular Volume


 79 fL ()





 


Mean Corpuscular Hemoglobin 26 pg (25-35)  


 


Mean Corpuscular Hemoglobin


Concent 33 g/dL


(31-37)


 


Red Cell Distribution Width


 25.5 %


(11.5-14.5)  H


 


Platelet Count


 387 x10^3/uL


(140-400)


 


Neutrophils (%) (Auto) 71 % (31-73)  


 


Lymphocytes (%) (Auto) 21 % (24-48)  L


 


Monocytes (%) (Auto) 7 % (0-9)  


 


Eosinophils (%) (Auto) 1 % (0-3)  


 


Basophils (%) (Auto) 1 % (0-3)  


 


Neutrophils # (Auto)


 3.9 x10^3uL


(1.8-7.7)


 


Lymphocytes # (Auto)


 1.1 x10^3/uL


(1.0-4.8)


 


Monocytes # (Auto)


 0.4 x10^3/uL


(0.0-1.1)


 


Eosinophils # (Auto)


 0.1 x10^3/uL


(0.0-0.7)


 


Basophils # (Auto)


 0.0 x10^3/uL


(0.0-0.2)


 


Platelet Estimate


 Adequate


(ADEQUATE)


 


Large Platelets Occ  


 


Anisocytosis Mod  


 


Ovalocytes Occ  


 


Urine Collection Type Void  


 


Urine Color Yellow  


 


Urine Clarity Clear  


 


Urine pH 6.5  


 


Urine Specific Gravity 1.020  


 


Urine Protein


 Negative mg/dL


(NEG-TRACE)


 


Urine Glucose (UA)


 Negative mg/dL


(NEG)


 


Urine Ketones (Stick)


 Negative mg/dL


(NEG)


 


Urine Blood


 Negative (NEG)





 


Urine Nitrite


 Negative (NEG)





 


Urine Bilirubin


 Negative (NEG)





 


Urine Urobilinogen Dipstick


 0.2 mg/dL (0.2


mg/dL)


 


Urine Leukocyte Esterase


 Negative (NEG)





 


Urine RBC 0 /HPF (0-2)  


 


Urine WBC


 Occ /HPF (0-4)





 


Urine Squamous Epithelial


Cells Occ /LPF  





 


Urine Bacteria


 0 /HPF (0-FEW)





 


Urine Mucus Mod /LPF  


 


Sodium Level


 143 mmol/L


(136-145)


 


Potassium Level


 3.6 mmol/L


(3.5-5.1)


 


Chloride Level


 104 mmol/L


()


 


Carbon Dioxide Level


 27 mmol/L


(21-32)


 


Anion Gap 12 (6-14)  


 


Blood Urea Nitrogen


 9 mg/dL (7-20)





 


Creatinine


 0.9 mg/dL


(0.6-1.0)


 


Estimated GFR


(Cockcroft-Gault) 74.7  





 


BUN/Creatinine Ratio 10 (6-20)  


 


Glucose Level


 94 mg/dL


(70-99)


 


Calcium Level


 9.3 mg/dL


(8.5-10.1)


 


Total Bilirubin


 0.3 mg/dL


(0.2-1.0)


 


Aspartate Amino Transferase


(AST) 15 U/L (15-37)





 


Alanine Aminotransferase (ALT)


 19 U/L (14-59)





 


Alkaline Phosphatase


 288 U/L


()  H


 


Total Protein


 7.5 g/dL


(6.4-8.2)


 


Albumin


 3.9 g/dL


(3.4-5.0)


 


Albumin/Globulin Ratio 1.1 (1.0-1.7)  





 Laboratory Tests


11/21/18 13:10








 Laboratory Tests


11/21/18 13:10














EKG


EKG


[]





Radiology/Procedures


Radiology/Procedures


PROCEDURE: CT ABD PELV W/ IV CONTRST ONLY





CT study of the abdomen and pelvis with contrast


 


 


 


Clinical indications: Right lower quadrant pain. Diarrhea.


 


TECHNIQUE: After IV infusion of 75 cc of Optiray 300, helical CT scanning 


of abdomen and pelvis was performed. No GI contrast was administered. This


may decrease the sensitivity to detect GI tract pathology.


 


 


 


PQRS compliance Statement


 


One or more of the following individualized dose reduction techniques were


utilized for this study:


1.  Automated exposure control


2.  Adjustment of the mA and/or kV according to patient size


3.  Use of iterative reconstruction technique


 


COMPARISON: August 10, 2018.


 


FINDINGS: The liver and spleen and pancreas are unremarkable. The 


gallbladder is surgically absent. No progressive dilatation of the extra 


hepatic biliary tree is seen study. No adrenal mass is evident. There is a


cyst of the lower pole of the right kidney. No hydronephrosis or 


hydroureter is evident. There is a nonobstructing punctate stone of the 


lower pole of the left kidney. No focal aneurysmal dilatation of the 


abdominal aorta is seen. No enlarged abdominal or pelvic lymphadenopathy 


is evident. No uterine mass or fibroid is seen. No dominant ovarian cyst 


or mass is evident. There is segmental wall thickening of the colon 


involving the transverse and descending and sigmoid portions. This may be 


seen with colitis. No obstructive bowel pattern is evident. There are no 


CT findings of appendicitis. No free air or free fluid or mesenteric edema


is seen. No lung base consolidation is evident. There is trabecular 


thickening and expansion of T11 and L2 vertebral bodies which is unchanged


from the previous study and this may be seen with Paget's disease given 


the expansion. No new lytic process is seen. No new compression fracture 


is evident. 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 Since the previous study,


the patient has had L4-5 fusion. Grade 1 anterolisthesis at this level is 


stable.


 


IMPRESSION: Segmental wall thickening of the colon which may be seen with 


colitis.


 


Moderate-sized hiatal hernia.


 


Nonobstructing punctate stone of the left kidney.[]





Course & Med Decision Making


Course & Med Decision Making


Pertinent Labs and Imaging studies reviewed. (See chart for details)


Dx: colitis


CT suggests colitis. Labs WNL. Offered admission to hospital to patient, 

patient declines admission. Pt reports decreased pain after medications. 

Prescriptions written for flagyl, cipro, and hydrocodone. Follow up with PCP in 

1-2 days, return to ER if symptoms worsen. 


Patient verbalized an understanding of home care, medications, follow-up, and 

return to ED instructions and was in agreement with the plan of care.


[]





Dragon Disclaimer


Dragon Disclaimer


This electronic medical record was generated, in whole or in part, using a 

voice recognition dictation system.





Departure


Departure


Impression:  


 Primary Impression:  


 Colitis


 Additional Impression:  


 Right lower quadrant abdominal pain


Disposition:  01 HOME, SELF-CARE


Condition:  STABLE


Referrals:  


MIN DUMONT MD (PCP)


Patient Instructions:  Colitis





Additional Instructions:  


Fill the prescriptions and use as directed. Increase clear fluids. Follow up 

with your PCP in 1-2 days. Return to the ER if your symptoms worsen.


Scripts


Hydrocodone Bit/Acetaminophen (HYDROCODONE-APAP 5-325  **) 1 Each Tablet


1 TAB PO PRN Q6HRS PRN for PAIN for 5 Days, #20 TAB 0 Refills


   Prov: ARIANNA CAT APRN         11/21/18 


Ciprofloxacin Hcl (CIPRO) 500 Mg Tablet


1 TAB PO BID, #20 TAB


   Prov: ARIANNA CAT APRN         11/21/18 


Metronidazole (FLAGYL) 250 Mg Tablet


1 TAB PO TID, #30 TAB


   Prov: ARIANNA CAT APRN         11/21/18





Problem Qualifiers











ARIANNA CAT APRN Nov 21, 2018 16:33

## 2019-01-04 ENCOUNTER — HOSPITAL ENCOUNTER (EMERGENCY)
Dept: HOSPITAL 61 - ER | Age: 74
LOS: 1 days | Discharge: HOME | End: 2019-01-05
Payer: COMMERCIAL

## 2019-01-04 VITALS — WEIGHT: 168 LBS | BODY MASS INDEX: 28.68 KG/M2 | HEIGHT: 64 IN

## 2019-01-04 VITALS
SYSTOLIC BLOOD PRESSURE: 217 MMHG | DIASTOLIC BLOOD PRESSURE: 85 MMHG | SYSTOLIC BLOOD PRESSURE: 217 MMHG | DIASTOLIC BLOOD PRESSURE: 85 MMHG

## 2019-01-04 DIAGNOSIS — M54.5: ICD-10-CM

## 2019-01-04 DIAGNOSIS — Z90.49: ICD-10-CM
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
## 2019-01-04 DIAGNOSIS — Z88.8: ICD-10-CM
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
## 2019-01-04 DIAGNOSIS — I48.91: ICD-10-CM

## 2019-01-04 DIAGNOSIS — Z90.89: ICD-10-CM

## 2019-01-04 DIAGNOSIS — G89.11: ICD-10-CM

## 2019-01-04 DIAGNOSIS — Z86.718: ICD-10-CM

## 2019-01-04 DIAGNOSIS — Z88.6: ICD-10-CM

## 2019-01-04 DIAGNOSIS — I10: ICD-10-CM

## 2019-01-04 DIAGNOSIS — Z87.11: ICD-10-CM
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
## 2019-01-04 DIAGNOSIS — I25.10: ICD-10-CM

## 2019-01-04 DIAGNOSIS — K21.9: ICD-10-CM

## 2019-01-04 DIAGNOSIS — G89.29: Primary | ICD-10-CM

## 2019-01-04 DIAGNOSIS — Z88.5: ICD-10-CM
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
## 2019-01-04 PROCEDURE — 99284 EMERGENCY DEPT VISIT MOD MDM: CPT

## 2019-01-05 NOTE — PHYS DOC
Past Medical History


Past Medical History:  A-Fib, CAD, DVT, GERD, Hypertension, P.U.D.


Additional Past Medical Histor:  PEPTIC ULCER, BACK PROBLEMS, DEGENERATIVE DISC 

DISEASE


Past Surgical History:  Appendectomy, Cholecystectomy


Additional Past Surgical Histo:  LEFT GREAT TOE CORRECTION, "BACK SURGERY"


Alcohol Use:  None


Drug Use:  None





Adult General


Chief Complaint


Chief Complaint:  LOWER BACK PAIN OR INJURY





HPI


HPI





Patient is a 73  year old female with history of hypertension, CAD, who 

presents today complaining of 9 out of 10 left low back pain that has been 

going on since December 24, 2018 when she fell. She states she was evaluated at 

[facility] and had negative x-rays done on December 27, 2018. She states around September 2018 she had L4-L5 lumbar laminectomy. She 

states she follows up with Dr. Awad. Patient denies any new injuries. Denies 

any loss of bowel bladder function, she states she has chronic pain radiating 

to bilateral lower extremities. Denies any numbness or tingling to bilateral 

lower extremities. She states her PCP usually gives her tramadol. She currently 

does not have any tramadol.





Review of Systems


Review of Systems





Constitutional: Denies fever or chills []


GI: Denies abdominal pain, nausea, vomiting, bloody stools or diarrhea []


: Denies dysuria or hematuria []


Musculoskeletal: Reports low back pain


Integument: Denies rash or skin lesions []


Neurologic: Denies headache, focal weakness or sensory changes []





All other systems were reviewed and found to be within normal limits, except as 

documented in this note.





Current Medications


Current Medications





Current Medications








 Medications


  (Trade)  Dose


 Ordered  Sig/[facility]  Start Time


 Stop Time Status Last Admin


Dose Admin


 


 Tramadol HCl


  (Ultram)  50 mg  1X  ONCE  1/5/19 00:30


 1/5/19 00:31  1/5/19 00:05


50 MG











Allergies


Allergies





Allergies








Coded Allergies Type Severity Reaction Last Updated Verified


 


  aspirin Allergy Intermediate  4/25/17 Yes


 


  codeine Allergy Intermediate  4/25/17 Yes


 


  diazepam Allergy Intermediate  4/25/17 Yes


 


  ibuprofen Allergy Intermediate  4/25/17 Yes


 


  ketorolac Allergy Intermediate Shortness of Air 8/2/18 Yes


 


  morphine Allergy Intermediate takes ULTRAM at home 8/7/17 Yes


 


  promethazine Allergy Intermediate  4/25/17 Yes


 


Uncoded Allergies Type Severity Reaction Last Updated Verified


 


  IM SHOTS Allergy Unknown  5/5/14 











Physical Exam


Physical Exam





Constitutional: Well developed, well nourished, no acute distress, non-toxic 

appearance. []


Abdomen: Bowel sounds normal, soft, no tenderness, no masses, no pulsatile 

masses. [] 


Skin: Warm, dry, no erythema, no rash. [] 


Back: Patient has a lumbar brace on. Diffuse paraspinal muscle tenderness 

bilateral lumbar spine worse on the left lower lumbar spine, no midline lumbar 

spine tenderness, no CVA tenderness. [] 


Extremities: No tenderness, no cyanosis, no clubbing, ROM intact, no edema. [] 


Neurologic: Alert and oriented X 3, normal motor function, normal sensory 

function, no focal deficits noted. []


Psychologic: Affect normal, judgement normal, mood normal. []





Current Patient Data


Vital Signs





 Vital Signs








  Date Time  Temp Pulse Resp B/P (MAP) Pulse Ox O2 Delivery O2 Flow Rate FiO2


 


1/4/19 23:30 98.4 80 20 217/85 (129) 100 Room Air  





 98.4       











EKG


EKG


[]





Radiology/Procedures


Radiology/Procedures


[]





Course & Med Decision Making


Course & Med Decision Making


Pertinent Labs and Imaging studies reviewed. (See chart for details)





This is a 73-year-old female patient presenting to the ED today with low back 

pain. She fell down around December 24, already had x-rays done December 27, 2018 which were negative. No new injuries. Patient is out of tramadol. She has 

a neurosurgeon she follows up with who did her lumbar laminectomy in September 2018. Patient was given prescription for 10 tablets of tramadol. Instructed to 

follow-up with her doctor in the course of next week. Her blood pressure was 217

/85. History of hypertension. Has not taken her evening medicines. Recommended 

she takes her evening medicines as she gets home.





Dragon Disclaimer


Dragon Disclaimer


This electronic medical record was generated, in whole or in part, using a 

voice recognition dictation system.





Departure


Departure


Impression:  


 Primary Impression:  


 Chronic back pain


 Additional Impression:  


 HTN (hypertension)


Disposition:  01 HOME, SELF-CARE


Condition:  STABLE


Referrals:  


MIN DUMONT MD (PCP)


Follow-up next week


Patient Instructions:  Back Pain, Adult, Easy-to-Read, Hypertension





Additional Instructions:  


You were evaluated in the emergency room for chronic low back pain. Please take 

the prescribed medications as ordered. Please follow-up with your own primary 

care doctor as well as neurosurgeon in the course of next week. Ensure you take 

her blood pressure medicine as prescribed by your primary care doctor. Follow-

up with your own primary care doctor in the course of next week.


Scripts


Tramadol Hcl (TRAMADOL HCL) 50 Mg Tablet


50 MG PO Q6HRS PRN for PAIN, #10 TAB


   Prov: JAREN CUNHA APRN         1/5/19





Problem Qualifiers








 Primary Impression:  


 Chronic back pain


 Back pain location:  low back pain  Back pain laterality:  bilateral  Sciatica 

presence:  with sciatica  Sciatica laterality:  bilateral sciatica  Qualified 

Codes:  M54.42 - Lumbago with sciatica, left side; M54.41 - Lumbago with 

sciatica, right side; G89.29 - Other chronic pain


 Additional Impression:  


 HTN (hypertension)


 Hypertension type:  unspecified  Qualified Codes:  I10 - Essential (primary) 

hypertension








JAREN CUNHA APRN Jan 5, 2019 00:15

## 2019-03-06 ENCOUNTER — HOSPITAL ENCOUNTER (OUTPATIENT)
Dept: HOSPITAL 61 - SLPLAB | Age: 74
Discharge: HOME | End: 2019-03-06
Attending: INTERNAL MEDICINE
Payer: COMMERCIAL

## 2019-03-06 DIAGNOSIS — G47.33: Primary | ICD-10-CM

## 2019-03-06 DIAGNOSIS — G47.61: ICD-10-CM

## 2019-03-06 PROCEDURE — 95810 POLYSOM 6/> YRS 4/> PARAM: CPT

## 2019-03-11 NOTE — SLEEP
DATE OF STUDY:  03/06/2019



ATTENDING PHYSICIAN:  Dr. Bossman Harkins.



REFERRING PHYSICIAN:  Dr. Dylan Burdick.



The patient is 73 years old who weighs 172 pounds with a BMI of 29.  The

patient's [place] score was 1.  The patient underwent diagnostic sleep study at

[facility].



During the night study, the patient spent 408 minutes in bed and slept for 352

minutes with a sleep efficiency of 86%.  Sleep latency was 26 minutes with a REM

latency of 137 minutes.



Sleep architecture showed normal stage 1 sleep, increased stage 2 sleep, normal

slow wave and normal REM sleep.



During the night study, the patient had 13 obstructive apneas, 3 mixed apneas, 1

central apnea and 15 hypopneas.  The patient's apnea hypopnea index was 6 per

hour, supine index 7 per hour and the REM index of 20 per hour.



EKG monitoring revealed a paced rhythm, average heart rate 72 beats per minute,

no sustained arrhythmias were observed.



The patient's average oxygen

saturation remained around 96% with the lowest of 90%.



PLMs were seen at index of 77 per hour and 9 per hour caused EEG arousals.



Due to low AHI, the patient did not meet the criteria for CPAP initiation.



IMPRESSION:



1.  Mild sleep apnea hypopnea syndrome with moderate increase during REM sleep. 

Total AHI 6 per hour with a REM AHI of 20 per hour.

2.  Severe periodic limb movements.

3.  No clinically significant nocturnal hypoxia.  



RECOMMENDATIONS:

1.  Due to low AHI, the patient did not meet the split night criteria for CPAP

initiation.

2.  I would recommend weight loss as the initial form of treatment.

3.  If the patient remains symptomatic despite weight loss, then consider

treatment of sleep apnea with either oral appliance or a trial of CPAP.

4.  PLMs can be treated with dopaminergic agonist agents if the patient is

clinically symptomatic.  The patient should also be further evaluated for

symptoms of restless legs during the day.

5.  Caution regarding driving until hypersomnia is resolved with above

recommendations.

 



______________________________

SAUL PENDLETON MD DR:  TEJINDER/dedrick  JOB#:  [id number] / [id number]

DD:  03/11/2019 15:03  DT:  03/11/2019 18:50



BOSSMAN Daniels MD, GEORGE MD

[facility]

## 2019-04-20 ENCOUNTER — HOSPITAL ENCOUNTER (EMERGENCY)
Dept: HOSPITAL 61 - ER | Age: 74
Discharge: HOME | End: 2019-04-20
Payer: COMMERCIAL

## 2019-04-20 VITALS — HEIGHT: 64 IN | WEIGHT: 172 LBS | BODY MASS INDEX: 29.37 KG/M2

## 2019-04-20 VITALS — DIASTOLIC BLOOD PRESSURE: 86 MMHG | SYSTOLIC BLOOD PRESSURE: 202 MMHG

## 2019-04-20 DIAGNOSIS — J45.909: ICD-10-CM

## 2019-04-20 DIAGNOSIS — Z90.49: ICD-10-CM

## 2019-04-20 DIAGNOSIS — I11.9: ICD-10-CM

## 2019-04-20 DIAGNOSIS — Z88.8: ICD-10-CM

## 2019-04-20 DIAGNOSIS — Z90.89: ICD-10-CM

## 2019-04-20 DIAGNOSIS — M54.42: ICD-10-CM

## 2019-04-20 DIAGNOSIS — Z95.5: ICD-10-CM

## 2019-04-20 DIAGNOSIS — Z88.5: ICD-10-CM

## 2019-04-20 DIAGNOSIS — G89.29: Primary | ICD-10-CM

## 2019-04-20 DIAGNOSIS — M54.41: ICD-10-CM

## 2019-04-20 DIAGNOSIS — Z98.890: ICD-10-CM

## 2019-04-20 DIAGNOSIS — Z88.6: ICD-10-CM

## 2019-04-20 PROCEDURE — 96372 THER/PROPH/DIAG INJ SC/IM: CPT

## 2019-04-20 PROCEDURE — 99284 EMERGENCY DEPT VISIT MOD MDM: CPT

## 2019-04-20 NOTE — PHYS DOC
Past Medical History


Past Medical History:  Asthma, Heart Disease, Hypertension


Additional Past Medical Histor:  CARDIAC STENT PLACED 2015


Past Surgical History:  Appendectomy, Cholecystectomy


Additional Past Surgical Histo:  LEFT GREAT TOE CORRECTION, "BACK SURGERY"


Alcohol Use:  None


Drug Use:  None





Adult General


Chief Complaint


Chief Complaint:  BACK PAIN - NO INJURY





HPI


HPI





Patient is a 73  year old female with a history of hypertension, CAD, who 

presents to the ED today complaining of 10 out of 10 bilateral low back pain 

that has been going on since Wednesday this week. Patient denies any known 

injury. She states she has history of chronic low back pain. She states she 

will was in a bus ride from [place] to [place] on Wednesday and arrived 

having increased pain. Patient states she takes tramadol for her pain, she 

states her tramadol was stolen/lost in the bus. She does not have any police 

reports to present to us. She states she already called her PCP Dr. Torin Keita who stated she can follow-up with him next week on Friday. Patient 

states the pain radiates to the left lower extremity. Denies any loss of bowel 

bladder function. Denies any numbness or tingling to bilateral lower 

extremities.





Review of Systems


Review of Systems





Constitutional: Denies fever or chills []


GI: Denies abdominal pain, nausea, vomiting, bloody stools or diarrhea []


: Denies dysuria or hematuria []


Musculoskeletal: Reports low back pain


Integument: Denies rash or skin lesions []


Neurologic: Denies headache, focal weakness or sensory changes []


Endocrine: Denies polyuria or polydipsia []


All other systems were reviewed and found to be within normal limits, except as 

documented in this note.





Current Medications


Current Medications





Current Medications








 Medications


  (Trade)  Dose


 Ordered  Sig/[facility]  Start Time


 Stop Time Status Last Admin


Dose Admin


 


 Dexamethasone


 Sodium Phosphate


  (Decadron)  10 mg  1X  ONCE  4/20/19 16:45


 4/20/19 16:46  4/20/19 16:38


10 MG


 


 Fentanyl Citrate


  (Fentanyl 2ml


 Vial)  50 mcg  1X  ONCE  4/20/19 16:45


 4/20/19 16:46  4/20/19 16:38


50 MCG











Allergies


Allergies





Allergies








Coded Allergies Type Severity Reaction Last Updated Verified


 


  aspirin Allergy Intermediate  4/25/17 Yes


 


  codeine Allergy Intermediate  4/25/17 Yes


 


  diazepam Allergy Intermediate  4/25/17 Yes


 


  ibuprofen Allergy Intermediate  4/25/17 Yes


 


  ketorolac Allergy Intermediate Shortness of Air 8/2/18 Yes


 


  morphine Allergy Intermediate takes ULTRAM at home 8/7/17 Yes


 


  promethazine Allergy Intermediate  4/25/17 Yes


 


Uncoded Allergies Type Severity Reaction Last Updated Verified


 


  IM SHOTS Allergy Unknown  5/5/14 











Physical Exam


Physical Exam





Constitutional: Well developed, well nourished, no acute distress, non-toxic 

appearance. []


Abdomen: Bowel sounds normal, soft, no tenderness, no masses, no pulsatile 

masses. [] 


Skin: Warm, dry, no erythema, no rash. [] 


Back: Diffuse paraspinal muscle tenderness bilateral lumbar spine, no midline 

lumbar spine tenderness, no CVA tenderness. Unable to perform straight leg 

raises because patient is laying in side position


Extremities: No tenderness, no cyanosis, no clubbing, ROM intact, no edema. [] 


Neurologic: Alert and oriented X 3, normal motor function, normal sensory 

function, no focal deficits noted. []


Psychologic: Affect normal, judgement normal, mood normal. []





Current Patient Data


Vital Signs





 Vital Signs








  Date Time  Temp Pulse Resp B/P (MAP) Pulse Ox O2 Delivery O2 Flow Rate FiO2


 


4/20/19 16:38   16  97 Room Air  


 


4/20/19 16:04 98.6 81  202/86 (124)    





 98.6       











EKG


EKG


[]





Radiology/Procedures


Radiology/Procedures


[]





Course & Med Decision Making


Course & Med Decision Making


Pertinent Labs and Imaging studies reviewed. (See chart for details)





This is a 73-year-old female patient presenting to the ED today with 

exacerbation of chronic low back pain, no known injury, see history of present 

illness, she states that tramadol was lost during a bus ride from [place] to 

[place] on Wednesday. Patient has no police report. Informed her we will 

not refill her tramadol, she received a 30 day supply from a primary care 

doctor on March 30, 2019. Gave her prescription for Robaxin and Medrol Dosepak. 

Discharged to home she has no cauda equina syndrome symptoms. Her neurological 

exam is intact. Her blood pressure is 202/86, she states she has Toprol at home 

amazingly this was not lost during the bus ride. Instructed to ensure she takes 

her BP medicine as soon as she gets home





Dragon Disclaimer


Dragon Disclaimer


This electronic medical record was generated, in whole or in part, using a 

voice recognition dictation system.





Departure


Departure


Impression:  


 Primary Impression:  


 Chronic back pain


 Additional Impression:  


 HTN (hypertension)


Disposition:  01 HOME, SELF-CARE


Condition:  STABLE


Referrals:  


MIN DUMONT MD (PCP)


follow up next week


Patient Instructions:  Back Pain, Adult, Hypertension





Additional Instructions:  


You were evaluated in the emergency room for chronic low back pain. Please 

follow-up with your own doctor as soon as you can.


Scripts


Methocarbamol (ROBAXIN) 500 Mg Tablet


1 TAB PO TID, #30 TAB


   Prov: JAREN CUNHA APRN         4/20/19 


Methylprednisolone (MEDROL) 4 Mg Tab.ds.pk


1 PKG PO UD, #1 PKG


   Prov: JAREN CUNHA APRN         4/20/19





Problem Qualifiers








 Primary Impression:  


 Chronic back pain


 Back pain location:  low back pain  Back pain laterality:  bilateral  Sciatica 

presence:  with sciatica  Sciatica laterality:  sciatica of left side  

Qualified Codes:  M54.42 - Lumbago with sciatica, left side; G89.29 - Other 

chronic pain


 Additional Impression:  


 HTN (hypertension)


 Hypertension type:  unspecified  Qualified Codes:  I10 - Essential (primary) 

hypertension








JAREN CUNHA APRDARYL Apr 20, 2019 16:39

## 2019-11-10 ENCOUNTER — HOSPITAL ENCOUNTER (EMERGENCY)
Dept: HOSPITAL 61 - ER | Age: 74
Discharge: HOME | End: 2019-11-10
Payer: COMMERCIAL

## 2019-11-10 VITALS — WEIGHT: 172 LBS | BODY MASS INDEX: 28.66 KG/M2 | HEIGHT: 65 IN

## 2019-11-10 VITALS
DIASTOLIC BLOOD PRESSURE: 93 MMHG | SYSTOLIC BLOOD PRESSURE: 204 MMHG | SYSTOLIC BLOOD PRESSURE: 204 MMHG | DIASTOLIC BLOOD PRESSURE: 93 MMHG

## 2019-11-10 DIAGNOSIS — I11.9: ICD-10-CM

## 2019-11-10 DIAGNOSIS — Z88.8: ICD-10-CM

## 2019-11-10 DIAGNOSIS — J45.909: ICD-10-CM

## 2019-11-10 DIAGNOSIS — Z90.89: ICD-10-CM

## 2019-11-10 DIAGNOSIS — G43.909: ICD-10-CM

## 2019-11-10 DIAGNOSIS — M54.5: ICD-10-CM

## 2019-11-10 DIAGNOSIS — Z88.5: ICD-10-CM

## 2019-11-10 DIAGNOSIS — G89.29: Primary | ICD-10-CM

## 2019-11-10 DIAGNOSIS — Z90.49: ICD-10-CM

## 2019-11-10 DIAGNOSIS — Z95.5: ICD-10-CM

## 2019-11-10 DIAGNOSIS — Z88.6: ICD-10-CM

## 2019-11-10 DIAGNOSIS — I10: ICD-10-CM

## 2019-11-10 PROCEDURE — 99284 EMERGENCY DEPT VISIT MOD MDM: CPT

## 2019-11-10 PROCEDURE — 72100 X-RAY EXAM L-S SPINE 2/3 VWS: CPT

## 2019-11-10 NOTE — PHYS DOC
Past Medical History


Past Medical History:  Asthma, Heart Disease, Hypertension, Migraines


Additional Past Medical Histor:  CARDIAC STENT PLACED 2015, ulcer, chronic back 

pain


Past Surgical History:  Appendectomy, Cholecystectomy


Additional Past Surgical Histo:  LEFT GREAT TOE CORRECTION, "BACK SURGERY"


Alcohol Use:  None


Drug Use:  None





Adult General


Chief Complaint


Chief Complaint:  PAIN CONTROL





HPI


HPI





Patient is a 74  year old AA female who presents to the emergency department 

with history of increased back pain over surgical site since yesterday. Patient 

has a history of chronic low back pain she had back surgery lumbar fusion over 

10 months ago. Since having that surgery she has had problems with left hand and

left foot numbness. Patient states that she fell down 5 steps 2 days ago but she

didn't feel like anything was hurt until last night when she developed pain in 

her lower lumbar spine. Patient denies any redness, warmth, or drainage at the 

surgical site. She denies any bruising, abrasions, swelling, or pain from the 

fall. Patient states that her pain is a 7 out of 10 on the pain scale. She took 

tramadol 2 hours ago with no relief of her pain. Patient reports that she took 

her blood pressure medications this morning as prescribed. She was seen by her 

primary care doctor last week on Wednesday for some x-rays of her spine but she 

does not have the results of those yet. Pt denies any saddle anesthesia, loss of

bowel/bladder control, nausea, vomiting, diarrhea, abdominal pain, increased 

urinary frequency, hematuria, or dysuria. All other ROS is neg unless otherwise 

noted in HPI.





Review of Systems


Review of Systems


See Above





Allergies


Allergies





Allergies








Coded Allergies Type Severity Reaction Last Updated Verified


 


  aspirin Allergy Intermediate  4/25/17 Yes


 


  codeine Allergy Intermediate  4/25/17 Yes


 


  diazepam Allergy Intermediate  4/25/17 Yes


 


  ibuprofen Allergy Intermediate  4/25/17 Yes


 


  ketorolac Allergy Intermediate Shortness of Air 8/2/18 Yes


 


  morphine Allergy Intermediate takes ULTRAM at home 8/7/17 Yes


 


  promethazine Allergy Intermediate  4/25/17 Yes











Physical Exam


Physical Exam


See Above


Constitutional: Well developed, well nourished, no acute distress, non-toxic 

appearance. []


HENT: Normocephalic, atraumatic, bilateral external ears normal, nose normal. []


Eyes: PERRLA, EOMI, conjunctiva normal, no discharge. [] 


Neck: Normal range of motion, no stridor. [] 


Cardiovascular:Heart rate regular rhythm


Lungs & Thorax:  Respirations even and unlabored, no retractions, no respiratory

 distress


Skin: Warm, dry, no erythema, no rash, no bruising [] 


Back: lumbar bony TTP, no paraspinal ttp, no CVA tenderness. [] 


Extremities: No cyanosis, no clubbing, ROM intact, no edema. [] 


Neurologic: Alert and oriented X 3, no focal deficits noted. []


Psychologic: Affect normal, judgement normal, mood normal. []





Current Patient Data


Vital Signs





                                   Vital Signs








  Date Time  Temp Pulse Resp B/P (MAP) Pulse Ox O2 Delivery O2 Flow Rate FiO2


 


11/10/19 17:15 98.5 81 16 204/93 (130) 98 Room Air  





 98.5       











EKG


EKG


[]





Radiology/Procedures


Radiology/Procedures


PROCEDURE: LUMBAR SPINE 2-3V





EXAM: AP, lateral and lumbosacral spot views of the lumbar spine


 


DATE: 11/10/2019 7:02 PM


 


INDICATION: Fall down 5 steps, low back pain


 


COMPARISON: 6/26/2018, 11/21/2018 CT


 


FINDINGS:


Mild height loss of the L2 and T11 vertebral body is essentially stable to


11/21/2018. Posterior spinal fusion hardware L4-5 is stable.


 


Multifocal expansile lesions within the T11, L1 vertebral bodies and 


sacrum with cortical and trabecular thickening may represent changes of 


Paget's disease.


 


Moderate to severe L5-S1 disc height loss. Mild L4-5 disc height loss. 


Mild L1-2 and L2-3 disc height loss.


 


IMPRESSION:


No acute fracture or subluxation. Height loss of the T11 and L1 vertebral 


bodies is stable.[]





Course & Med Decision Making


Course & Med Decision Making


Pertinent Labs and Imaging studies reviewed. (See chart for details)


dx: Chronic back pain


Patient is a 74-year-old female who presented to the emergency room with 

complaints of low back pain exacerbation since last night. She reported a fall 

the day before down approximately 5 steps, patient denied any saddle anesthesia,

 loss of bowel or bladder control. She reported chronic left foot and left hand 

numbness following a back surgery 10 months ago. Her lumbar plain films were 

negative for any acute fracture. Physical exam was not concerning for any acute 

condition including cauda equina. Patient was encouraged to take her pain 

medication as prescribed by her primary care doctor and to follow-up with him 

about her chronic pain issues. The patient's blood pressure was elevated on 

arrival, patient reported a history of hypertension, states that she takes her 

blood pressure medication at home as prescribed.





Patient verbalized an understanding of home care, medications, follow-up, and 

return to ED instructions and was in agreement with the plan of care.


[]





Dragon Disclaimer


Dragon Disclaimer


This electronic medical record was generated, in whole or in part, using a voice

 recognition dictation system.





Departure


Departure


Impression:  


   Primary Impression:  


   Chronic back pain


Disposition:  01 HOME, SELF-CARE


Condition:  STABLE


Referrals:  


ABDON CHERY MD (PCP)


Patient Instructions:  Back Pain, Adult, Easy-to-Read





Additional Instructions:  


Continue taking your pain medication as prescribed. Follow up with your primary 

care doctor for further evaluation and management of your chronic pain. Return 

to the ER if symptoms worsen.





Problem Qualifiers








   Primary Impression:  


   Chronic back pain


   Back pain location:  low back pain  Back pain laterality:  midline  Sciatica 

   presence:  without sciatica  Qualified Codes:  M54.5 - Low back pain; G89.29 

   - Other chronic pain








ARIANNA CAT APRDARYL       Nov 10, 2019 19:19

## 2019-11-10 NOTE — RAD
EXAM: AP, lateral and lumbosacral spot views of the lumbar spine

 

DATE: 11/10/2019 7:02 PM

 

INDICATION: Fall down 5 steps, low back pain

 

COMPARISON: 6/26/2018, 11/21/2018 CT

 

FINDINGS:

Mild height loss of the L2 and T11 vertebral body is essentially stable to

11/21/2018. Posterior spinal fusion hardware L4-5 is stable.

 

Multifocal expansile lesions within the T11, L1 vertebral bodies and 

sacrum with cortical and trabecular thickening may represent changes of 

Paget's disease.

 

Moderate to severe L5-S1 disc height loss. Mild L4-5 disc height loss. 

Mild L1-2 and L2-3 disc height loss.

 

IMPRESSION:

No acute fracture or subluxation. Height loss of the T11 and L1 vertebral 

bodies is stable.

 

Electronically signed by: Aleksandar Winston MD (11/10/2019 7:03 PM) [id number]

## 2020-04-02 ENCOUNTER — HOSPITAL ENCOUNTER (OUTPATIENT)
Dept: HOSPITAL 61 - KCIC | Age: 75
Discharge: HOME | End: 2020-04-02
Attending: FAMILY MEDICINE
Payer: COMMERCIAL

## 2020-04-02 DIAGNOSIS — J84.10: Primary | ICD-10-CM

## 2020-04-02 DIAGNOSIS — K44.9: ICD-10-CM

## 2020-04-02 PROCEDURE — 71046 X-RAY EXAM CHEST 2 VIEWS: CPT

## 2020-04-02 NOTE — KCIC
CHEST PA   LATERAL

 

Clinical indications: Dry cough for 2 months. Asthma. Nonsmoker. 

 

COMPARISON: January 14, 2019. February 3, 2018.

 

Findings: Again seen is a granuloma of the superior segment left lower 

lobe which is unchanged. No acute lung infiltrate or pleural effusion or 

pulmonary edema or lung mass or pneumothorax is seen.  Again seen is a 

retrocardiac hiatal hernia. The heart size, pulmonary vasculature, 

mediastinum and both ethan are otherwise unremarkable. The osseous 

structures appear intact. Thoracic spinal canal stimulator device has been

placed in the interim.

 

Impression: No acute radiographic abnormality is seen.

 

Electronically signed by: Siddhartha Perry MD (4/2/2020 10:53 AM) [id number]

## 2020-05-16 ENCOUNTER — HOSPITAL ENCOUNTER (EMERGENCY)
Dept: HOSPITAL 61 - ER | Age: 75
Discharge: HOME | End: 2020-05-16
Payer: COMMERCIAL

## 2020-05-16 VITALS — BODY MASS INDEX: 25.48 KG/M2 | WEIGHT: 149.25 LBS | HEIGHT: 64 IN

## 2020-05-16 VITALS — DIASTOLIC BLOOD PRESSURE: 77 MMHG | SYSTOLIC BLOOD PRESSURE: 152 MMHG
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
## 2020-05-16 DIAGNOSIS — Z90.49: ICD-10-CM

## 2020-05-16 DIAGNOSIS — G89.29: ICD-10-CM

## 2020-05-16 DIAGNOSIS — Z88.6: ICD-10-CM

## 2020-05-16 DIAGNOSIS — Z98.890: ICD-10-CM

## 2020-05-16 DIAGNOSIS — Z88.5: ICD-10-CM

## 2020-05-16 DIAGNOSIS — I11.9: ICD-10-CM

## 2020-05-16 DIAGNOSIS — S32.040A: Primary | ICD-10-CM

## 2020-05-16 DIAGNOSIS — Z88.2: ICD-10-CM

## 2020-05-16 DIAGNOSIS — Z90.89: ICD-10-CM
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
## 2020-05-16 DIAGNOSIS — Z88.8: ICD-10-CM

## 2020-05-16 DIAGNOSIS — J45.909: ICD-10-CM

## 2020-05-16 DIAGNOSIS — G43.909: ICD-10-CM
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
## 2020-05-16 PROCEDURE — 72100 X-RAY EXAM L-S SPINE 2/3 VWS: CPT

## 2020-05-16 PROCEDURE — 96372 THER/PROPH/DIAG INJ SC/IM: CPT

## 2020-05-16 PROCEDURE — 99285 EMERGENCY DEPT VISIT HI MDM: CPT

## 2020-05-16 NOTE — RAD
Three view lumbosacral spine

 

History: Pain

 

AP, coned-down lateral and lateral views of the lumbosacral spine were 

obtained.

 

COMPARISON: November 10, 2019

 

FINDINGS:

 

There is grade 1 anterolisthesis of L5 on S1 and there is mild 

retrolisthesis of L2 on L3. There is gross osteopenia. There is moderate 

loss of stature of the L2 vertebral body with impaction of the endplates. 

There has been prior fusion of L4-5 with bilateral pedicle screws and 

posterior fusion rods.

 

Impression: 

 

Progressive loss of stature of the L2 vertebral body is likely an acute or

subacute compression fracture.

 

End Impression

 

Electronically signed by: Nestor Frederick III, MD (5/16/2020 2:49 AM) [id number]

## 2020-05-16 NOTE — PHYS DOC
Past Medical History


Past Medical History:  Anemia, Asthma, Heart Disease, Hypertension, Migraines, 

P.U.D.


Additional Past Medical Histor:  CARDIAC STENT PLACED 2015, ulcer, chronic back 

pain


Past Surgical History:  Appendectomy, Cholecystectomy


Additional Past Surgical Histo:  LEFT GREAT TOE CORRECTION, "BACK SURGERY", R 

BREAST BIOPSY, EXPLORATORY ABD


Smoking Status:  Never Smoker


Alcohol Use:  None


Drug Use:  None





General Adult


EDM:


Chief Complaint:  BACK PAIN OR INJURY





HPI:


HPI:





Patient is a 74  year old female who presents with complaint of lower back pain 

for the last week.  Patient reportedly had fallen down stairs a week ago and was

seen at [facility] where they did x-rays but did not find any 

abnormalities.  Patient states that she was discharged home with no pain 

medication.  She rates her pain to be a 10 out of 10.  She denies any loss of 

bowel or bladder control.  She denies any pain radiation.  []





Review of Systems:


Review of Systems:


Constitutional:  Denies fever or chills. []


Respiratory:  Denies cough or shortness of breath. [] 


Cardiovascular:  Denies chest pain or edema. [] 


GI:  Denies abdominal pain, nausea, vomiting or diarrhea. [] 


Musculoskeletal: Complains of lower back pain. [] 


Integument:  Denies rash. [] 


Neurologic:  Denies headache, focal weakness or sensory changes. [] 





A full 10 point review of systems has been reviewed and is otherwise negative.





Heart Score:


Risk Factors:


Risk Factors:  DM, Current or recent (<one month) smoker, HTN, HLP, family 

history of CAD, obesity.


Risk Scores:


Score 0 - 3:  2.5% MACE over next 6 weeks - Discharge Home


Score 4 - 6:  20.3% MACE over next 6 weeks - Admit for Clinical Observation


Score 7 - 10:  72.7% MACE over next 6 weeks - Early Invasive Strategies





Current Medications:





Current Medications








 Medications


  (Trade)  Dose


 Ordered  Sig/[facility]  Start Time


 Stop Time Status Last Admin


Dose Admin


 


 Clonidine HCl


  (Catapres)  0.2 mg  1X  ONCE  5/16/20 02:30


 5/16/20 02:46 DC 5/16/20 02:27


0.2 MG


 


 Dexamethasone


 Sodium Phosphate


  (Decadron)  10 mg  1X  ONCE  5/16/20 02:45


 5/16/20 02:46 DC 5/16/20 02:42


10 MG


 


 Fentanyl Citrate


  (Fentanyl 2ml


 Vial)  100 mcg  STK-MED ONCE  5/16/20 03:17


 5/16/20 03:17 DC  





 


 Orphenadrine


 Citrate


  (Norflex)  60 mg  1X  ONCE  5/16/20 02:45


 5/16/20 02:46 DC 5/16/20 02:42


60 MG


 


 Tramadol HCl


  (Ultram)  50 mg  1X  ONCE  5/16/20 02:45


 5/16/20 02:46 DC 5/16/20 02:43


50 MG











Allergies:


Allergies:





Allergies








Coded Allergies Type Severity Reaction Last Updated Verified


 


  aspirin Allergy Intermediate  4/25/17 Yes


 


  codeine Allergy Intermediate  4/25/17 Yes


 


  diazepam Allergy Intermediate  4/25/17 Yes


 


  ibuprofen Allergy Intermediate  4/25/17 Yes


 


  ketorolac Allergy Intermediate Shortness of Air 8/2/18 Yes


 


  morphine Allergy Intermediate takes ULTRAM at home 8/7/17 Yes


 


  promethazine Allergy Intermediate  4/25/17 Yes











Physical Exam:


PE:





Constitutional: Well developed, well nourished, no acute distress, non-toxic 

appearance. []


HENT: Normocephalic, atraumatic, bilateral external ears normal, oropharynx 

moist, no oral exudates, nose normal. []


Eyes: PERRLA, EOMI, conjunctiva normal, no discharge. [] 


Neck: Normal range of motion, no tenderness, supple, no stridor. [] 


Cardiovascular: Regular rate and rhythm []


Lungs & Thorax:  Bilateral breath sounds clear to auscultation []


Abdomen: Bowel sounds normal, soft, no tenderness. [] 


Skin: Warm, dry, no erythema, no rash. [] 


Back: With reported tenderness to palpation in the bilateral paraspinal 

musculature of mid to lower lumbar region. [] 


Extremities: No tenderness, no cyanosis, no clubbing, ROM intact. [] 


Neurologic: Alert and oriented X 3, no focal deficits noted. []





Current Patient Data:


Vital Signs:





                                   Vital Signs








  Date Time  Temp Pulse Resp B/P (MAP) Pulse Ox O2 Delivery O2 Flow Rate FiO2


 


5/16/20 03:28 98.0 101 26 211/100 (137) 100 Room Air  





 98.0       











EKG:


EKG:


[]





Radiology/Procedures:


Radiology/Procedures:


[]


Impression:


PROCEDURE: LUMBAR SPINE 2-3V





 


Three view lumbosacral spine


 


History: Pain


 


AP, coned-down lateral and lateral views of the lumbosacral spine were 


obtained.


 


COMPARISON: November 10, 2019


 


FINDINGS:


 


There is grade 1 anterolisthesis of L5 on S1 and there is mild 


retrolisthesis of L2 on L3. There is gross osteopenia. There is moderate 


loss of stature of the L2 vertebral body with impaction of the endplates. 


There has been prior fusion of L4-5 with bilateral pedicle screws and 


posterior fusion rods.


 


Impression: 


 


Progressive loss of stature of the L2 vertebral body is likely an acute or


subacute compression fracture.


 


End Impression


 


Electronically signed by: Nestor Frederick III, MD (5/16/2020 2:49 AM) [id number]











Course & Med Decision Making:


Course & Med Decision Making


Pertinent Labs and Imaging studies reviewed. (See chart for details)





[]





Dragon Disclaimer:


Dragon Disclaimer:


This electronic medical record was generated, in whole or in part, using a voice

 recognition dictation system.





Departure


Departure


Impression:  


   Primary Impression:  


   Lumbar compression fracture


   Qualified Codes:  S32.040A - Wedge compression fracture of fourth lumbar 

   vertebra, initial encounter for closed fracture


Disposition:  01 HOME, SELF-CARE


Condition:  STABLE


Referrals:  


ABDON CHERY MD (PCP)


Patient Instructions:  Back, Compression Fracture


Scripts


Oxycodone/Apap 5-325 (PERCOCET 5-325 MG TABLET **) 1 Each Tablet


1-2 EACH PO Q6HRS PRN for PAIN, #20 TAB


   pain


   Prov: ITALIA RUIZ Jr. DO         5/16/20 


Orphenadrine Citrate (ORPHENADRINE CITRATE) 100 Mg Tablet.er


1 TAB PO BID PRN for MUSCLE SPASMS, #14 TAB


   Prov: ITALIA RUIZ Jr. DO         5/16/20











ITALIA RUIZ Jr. DO          May 16, 2020 03:54

## 2020-05-25 ENCOUNTER — HOSPITAL ENCOUNTER (EMERGENCY)
Dept: HOSPITAL 61 - ER | Age: 75
Discharge: HOME | End: 2020-05-25
Payer: COMMERCIAL

## 2020-05-25 VITALS
SYSTOLIC BLOOD PRESSURE: 158 MMHG | DIASTOLIC BLOOD PRESSURE: 66 MMHG | SYSTOLIC BLOOD PRESSURE: 158 MMHG | DIASTOLIC BLOOD PRESSURE: 66 MMHG | DIASTOLIC BLOOD PRESSURE: 66 MMHG | SYSTOLIC BLOOD PRESSURE: 158 MMHG

## 2020-05-25 VITALS — WEIGHT: 149.25 LBS | HEIGHT: 64 IN | BODY MASS INDEX: 25.48 KG/M2
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
## 2020-05-25 DIAGNOSIS — R10.2: ICD-10-CM

## 2020-05-25 DIAGNOSIS — G89.11: ICD-10-CM

## 2020-05-25 DIAGNOSIS — Z98.890: ICD-10-CM

## 2020-05-25 DIAGNOSIS — G43.909: ICD-10-CM

## 2020-05-25 DIAGNOSIS — J45.909: ICD-10-CM

## 2020-05-25 DIAGNOSIS — Z90.89: ICD-10-CM

## 2020-05-25 DIAGNOSIS — I11.9: ICD-10-CM

## 2020-05-25 DIAGNOSIS — M54.5: Primary | ICD-10-CM
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
## 2020-05-25 DIAGNOSIS — Z90.49: ICD-10-CM

## 2020-05-25 DIAGNOSIS — Z95.5: ICD-10-CM
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
## 2020-05-25 DIAGNOSIS — G89.29: ICD-10-CM
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
## 2020-05-25 PROCEDURE — 72170 X-RAY EXAM OF PELVIS: CPT

## 2020-05-25 PROCEDURE — 99285 EMERGENCY DEPT VISIT HI MDM: CPT

## 2020-05-25 PROCEDURE — 99284 EMERGENCY DEPT VISIT MOD MDM: CPT

## 2020-05-25 PROCEDURE — 72100 X-RAY EXAM L-S SPINE 2/3 VWS: CPT

## 2020-05-25 NOTE — RAD
Three-view lumbar spine and single view pelvis dated 5/25/2020.

 

Comparison made to 5/16/2020.

 

Clinical data indication: Pain after fall.

 

FINDINGS:

 

3 views lumbar spine show evidence of prior laminectomy and posterolateral

fusion at L4-L5. There is slight anterolisthesis at the disc level, 

unchanged. Hardware is intact. There is moderate compression deformity of 

L2, stable. There is also mild compression deformity of T11, unchanged. 

Vertebral body heights are otherwise maintained. Alignment is otherwise 

anatomic. Mild to moderate endplate hypertrophic changes throughout with 

multilevel disc space narrowing and facet arthropathy.

 

Single AP view pelvis shows normal bony alignment. No displaced fracture. 

The pelvic ring is intact. Mild hypertrophic change of the bilateral hip 

joint. Possible fusion of the bilateral SI joint.

 

IMPRESSION:

1. Wedge compression deformities at T11 and L2, stable from prior study. 

No new compression fracture identified.

2. Multilevel spondylosis with evidence of prior laminectomy and fusion at

L4-L5, stable from prior study.

3. No apparent pelvic fracture. If there is clinical concern for occult 

fracture or insufficiency fracture, MRI could better evaluate.

 

Electronically signed by: Bossman Hodgson MD (5/25/2020 2:33 PM) 

[facility]

## 2020-05-25 NOTE — RAD
Three-view lumbar spine and single view pelvis dated 5/25/2020.

 

Comparison made to 5/16/2020.

 

Clinical data indication: Pain after fall.

 

FINDINGS:

 

3 views lumbar spine show evidence of prior laminectomy and posterolateral

fusion at L4-L5. There is slight anterolisthesis at the disc level, 

unchanged. Hardware is intact. There is moderate compression deformity of 

L2, stable. There is also mild compression deformity of T11, unchanged. 

Vertebral body heights are otherwise maintained. Alignment is otherwise 

anatomic. Mild to moderate endplate hypertrophic changes throughout with 

multilevel disc space narrowing and facet arthropathy.

 

Single AP view pelvis shows normal bony alignment. No displaced fracture. 

The pelvic ring is intact. Mild hypertrophic change of the bilateral hip 

joint. Possible fusion of the bilateral SI joint.

 

IMPRESSION:

1. Wedge compression deformities at T11 and L2, stable from prior study. 

No new compression fracture identified.

2. Multilevel spondylosis with evidence of prior laminectomy and fusion at

L4-L5, stable from prior study.

3. No apparent pelvic fracture. If there is clinical concern for occult 

fracture or insufficiency fracture, MRI could better evaluate.

 

Electronically signed by: Bossman Hodgson MD (5/25/2020 2:33 PM) 

[facility]

## 2020-05-25 NOTE — PHYS DOC
Past Medical History


Past Medical History:  Anemia, Asthma, Heart Disease, Hypertension, Migraines, 

P.U.D.


Additional Past Medical Histor:  CARDIAC STENT PLACED 2015, ulcer, chronic back 

pain


Past Surgical History:  Appendectomy, Cholecystectomy


Additional Past Surgical Histo:  LEFT GREAT TOE CORRECTION, "BACK SURGERY", R 

BREAST BIOPSY, EXPLORATORY ABD


Smoking Status:  Never Smoker


Alcohol Use:  None


Drug Use:  None





General Adult


EDM:


Chief Complaint:  MECHANICAL FALL





HPI:


HPI:





Patient is a 74  year old  female who presented to the ER today for evaluation 

of lower back pain since she fell this morning.  Patient has history of chronic 

lower back pain.  Patient had nerve stimulator in her lower back area.  Patient 

tripped over her dog this morning, fell onto her pelvic and lower back.  She 

denied hitting her head.  Patient c/o of lower back pain, no bowel or bladder 

incontinence.





Review of Systems:


Review of Systems:


Constitutional:  Denies fever or chills. []


Eyes:  Denies change in visual acuity. []


HENT:  Denies nasal congestion or sore throat. [] 


Respiratory:  Denies cough or shortness of breath. [] 


Cardiovascular:  Denies chest pain or edema. [] 


GI:  Denies abdominal pain, nausea, vomiting, bloody stools or diarrhea. [] 


:  Denies dysuria. [] 


Musculoskeletal:  Positive for lower back pain.  


Integument:  Denies rash. [] 


Neurologic:  Denies headache, focal weakness or sensory changes. [] 


Endocrine:  Denies polyuria or polydipsia. [] 


Lymphatic:  Denies swollen glands. [] 


Psychiatric:  Denies depression or anxiety. []





Heart Score:


Risk Factors:


Risk Factors:  DM, Current or recent (<one month) smoker, HTN, HLP, family 

history of CAD, obesity.


Risk Scores:


Score 0 - 3:  2.5% MACE over next 6 weeks - Discharge Home


Score 4 - 6:  20.3% MACE over next 6 weeks - Admit for Clinical Observation


Score 7 - 10:  72.7% MACE over next 6 weeks - Early Invasive Strategies





Current Medications:





Current Medications








 Medications


  (Trade)  Dose


 Ordered  Sig/Mayte  Start Time


 Stop Time Status Last Admin


Dose Admin


 


 Acetaminophen/


 Hydrocodone Bitart


  (Lortab 5/325)  2 tab  1X  ONCE  20 14:15


 20 14:16 DC 20 14:37


2 TAB











Allergies:


Allergies:





Allergies








Coded Allergies Type Severity Reaction Last Updated Verified


 


  aspirin Allergy Intermediate  17 Yes


 


  codeine Allergy Intermediate  17 Yes


 


  diazepam Allergy Intermediate  17 Yes


 


  ibuprofen Allergy Intermediate  17 Yes


 


  ketorolac Allergy Intermediate Shortness of Air 18 Yes


 


  morphine Allergy Intermediate takes ULTRAM at home 17 Yes


 


  promethazine Allergy Intermediate  17 Yes











Physical Exam:


PE:





Constitutional: Well developed, well nourished, no acute distress, non-toxic 

appearance. []


HENT: Normocephalic, atraumatic, bilateral external ears normal, oropharynx 

moist, no oral exudates, nose normal. []


Eyes: PERRLA, EOMI, conjunctiva normal, no discharge. [] 


Neck: Normal range of motion, no tenderness, supple, no stridor. [] 


Cardiovascular:Heart rate regular rhythm, no murmur []


Lungs & Thorax:  Bilateral breath sounds clear to auscultation []


Abdomen: Bowel sounds normal, soft, no tenderness, no masses, no pulsatile 

masses. [] 


Skin: Warm, dry, no erythema, no rash. [] 


Back: No tenderness, no CVA tenderness.THERE IS NO BONY STEP OFF, NO CONTUSION. 

 There is tenderness to palpation at the L5/S1 area.  ] 


Extremities: No tenderness, no cyanosis, no clubbing, ROM intact, no edema. [] 


Neurologic: Alert and oriented X 3, normal motor function, normal sensory 

function, no focal deficits noted. []


Psychologic: Affect normal, judgement normal, mood normal. []





Current Patient Data:


Vital Signs:





                                   Vital Signs








  Date Time  Temp Pulse Resp B/P (MAP) Pulse Ox O2 Delivery O2 Flow Rate FiO2


 


20 14:37   19  98 Room Air  


 


20 13:17 98.2 82  183/81 (115)    





 98.2       











EKG:


EKG:


[]





Radiology/Procedures:


Radiology/Procedures:


[][facility]


                    [address]  [place], [place] [id number] [phone]


                                        


                                 IMAGING REPORT





                                     Signed





PATIENT: BRANDON GUERRERO  ACCOUNT: [id number]     MRN#: [id number]


: 1945           LOCATION: ER              AGE: 74


SEX: F                    EXAM DT: 20         ACCESSION#: [id number]


STATUS: REG ER            ORD. PHYSICIAN: SILVINO FIGUEROA DO


REASON: fell, back pain, pelvic pain


PROCEDURE: PELVIS





Three-view lumbar spine and single view pelvis dated 2020.


 


Comparison made to 2020.


 


Clinical data indication: Pain after fall.


 


FINDINGS:


 


3 views lumbar spine show evidence of prior laminectomy and posterolateral


fusion at L4-L5. There is slight anterolisthesis at the disc level, 


unchanged. Hardware is intact. There is moderate compression deformity of 


L2, stable. There is also mild compression deformity of T11, unchanged. 


Vertebral body heights are otherwise maintained. Alignment is otherwise 


anatomic. Mild to moderate endplate hypertrophic changes throughout with 


multilevel disc space narrowing and facet arthropathy.


 


Single AP view pelvis shows normal bony alignment. No displaced fracture. 


The pelvic ring is intact. Mild hypertrophic change of the bilateral hip 


joint. Possible fusion of the bilateral SI joint.


 


IMPRESSION:


1. Wedge compression deformities at T11 and L2, stable from prior study. 


No new compression fracture identified.


2. Multilevel spondylosis with evidence of prior laminectomy and fusion at


L4-L5, stable from prior study.


3. No apparent pelvic fracture. If there is clinical concern for occult 


fracture or insufficiency fracture, MRI could better evaluate.


 


Electronically signed by: Bossman Hodgson MD (2020 2:33 PM) 


[facility] [place]














DICTATED and SIGNED BY:     BOSSMAN HODGSON MD


DATE:     20 1433





Course & Med Decision Making:


Course & Med Decision Making


Pertinent Labs and Imaging studies reviewed. (See chart for details)





[]





Dragon Disclaimer:


Dragon Disclaimer:


This electronic medical record was generated, in whole or in part, using a voice

 recognition dictation system.





Departure


Departure


Impression:  


   Primary Impression:  


   Back pain


Disposition:   HOME, SELF-CARE


Condition:  STABLE


Referrals:  


ABDON CHERY MD (PCP)


FOLLOW UP WITH YOUR FAMILY DOCTOR AS NEEDED FOR FOLLOW UP.


Patient Instructions:  Back Pain, Adult


Scripts


Tramadol Hcl (TRAMADOL HCL) 50 Mg Tablet


50 MG PO Q6HRS PRN for PAIN, #20 TAB


   Prov: SILVINO FIGUEROA DO         20











SILVINO FIGUEROA DO                May 25, 2020 16:00

## 2020-08-11 ENCOUNTER — HOSPITAL ENCOUNTER (OUTPATIENT)
Dept: HOSPITAL 61 - RAD | Age: 75
Discharge: HOME | End: 2020-08-11
Attending: FAMILY MEDICINE
Payer: COMMERCIAL

## 2020-08-11 DIAGNOSIS — M17.12: Primary | ICD-10-CM

## 2020-08-11 DIAGNOSIS — M25.562: ICD-10-CM

## 2020-08-11 DIAGNOSIS — M25.552: ICD-10-CM

## 2020-08-11 PROCEDURE — 73560 X-RAY EXAM OF KNEE 1 OR 2: CPT

## 2020-08-11 PROCEDURE — 73502 X-RAY EXAM HIP UNI 2-3 VIEWS: CPT

## 2020-08-11 NOTE — RAD
EXAM: AP pelvis, AP and lateral views left hip

 

DATE: 8/11/2020 12:00 AM

 

INDICATION: Reason: LEFT HIP PAIN. / Spl. Instructions:  / History:  

 

COMPARISON: No Prior

 

FINDINGS:

Mild medial joint space narrowing of the hips bilaterally accentuated by 

patient rotation with associated coxa profunda SI joint fusion 

bilaterally. Expansile appearance of the bony structures of the sacrum, of

uncertain clinical significance although possibly Paget's given the 

trabecular and cortical prominence.

 

 

IMPRESSION:

1.  No evidence of acute fracture or dislocation.

2.  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

 

Electronically signed by: Aleksandar Winston MD (8/11/2020 7:51 PM) [facility]

## 2020-08-11 NOTE — RAD
EXAM: AP and lateral views left knee

 

DATE: 8/11/2020 12:00 AM

 

INDICATION: Reason: LEFT KNEE PAIN. / Spl. Instructions:  / History:  

 

COMPARISON: No Prior

 

FINDINGS/

IMPRESSION:

1.  No evidence of acute fracture or dislocation. 

2.  Left knee joint osteoarthritis with moderate medial compartment joint 

space narrowing with tricompartmental osteophytes. 

3.  No joint effusion.

 

Electronically signed by: Aleksandar Winston MD (8/11/2020 6:21 PM) [facility]

## 2020-10-19 ENCOUNTER — HOSPITAL ENCOUNTER (OUTPATIENT)
Dept: HOSPITAL 61 - KCIC | Age: 75
End: 2020-10-19
Attending: FAMILY MEDICINE
Payer: COMMERCIAL

## 2020-10-19 DIAGNOSIS — N28.9: ICD-10-CM

## 2020-10-19 DIAGNOSIS — N32.89: ICD-10-CM

## 2020-10-19 DIAGNOSIS — K44.9: Primary | ICD-10-CM

## 2020-10-19 DIAGNOSIS — N13.39: ICD-10-CM

## 2020-10-19 DIAGNOSIS — R63.4: ICD-10-CM

## 2020-10-19 DIAGNOSIS — N20.0: ICD-10-CM

## 2020-10-19 DIAGNOSIS — R74.8: ICD-10-CM

## 2020-10-19 DIAGNOSIS — K76.0: ICD-10-CM

## 2020-10-19 DIAGNOSIS — D64.9: ICD-10-CM

## 2020-10-19 PROCEDURE — 74177 CT ABD & PELVIS W/CONTRAST: CPT

## 2020-10-19 NOTE — [facility]
CT ABD PELV W/ORAL IV CONTRAST 

 

Indication: Anemia, 15 pound weight loss in one month, abnormal labs

 

Technique: Postcontrast CT imaging was performed of the abdomen and 

pelvis, multiplanar reconstruction images submitted. Oral contrast was 

also given. One or more of the following individualized dose reduction 

techniques were utilized for this examination:  1. Automated exposure 

control  2. Adjustment of the mA and/or kV according to patient size  3. 

Use of iterative reconstruction technique.

 

Comparison: November 21, 2018

 

Findings:  

There is mild motion. There is again moderate size hiatal hernia. There is

possible hepatic steatosis although poorly evaluated on postcontrast 

imaging. There is again 0.3 cm left renal calculus. There are couple of 

hypodense foci of the right kidney, largest inferiorly about 1.8 cm with 

density characteristics of a cyst. There is new mild right hydronephrosis.

There is no left hydronephrosis. Small 0.4 cm hypodense lesion of the 

inferior left kidney is too small to further accurately characterize. 

Ureters are difficult to entirely visualize on this exam, no delayed 

images available. Urinary bladder is somewhat distended. No focal 

abnormality is identified of the pancreas. There is some stool distention 

of the splenic flexure. Small bowel is not dilated. There is no free fluid

or free air. Appendix is not confidently identified if still present. 

There is again posterolateral fusion hardware L4-5 with bilateral pedicle 

screws attached to vertical rods. There is L2 compression fracture in part

present previously although progression of height loss and osseous 

retropulsion greater superiorly, suspected moderate to severe spinal 

stenosis. There is again focus of bone lysis of the T11 vertebral body, 

probably an underlying hemangioma. There is multilevel thoracolumbar 

degenerative disc disease. There is bone demineralization. No new 

significant lymphadenopathy is identified. There are thoracic spinal 

stimulator leads. Sacroiliac joints are fused bilaterally.

 

IMPRESSION:

1.  There is new mild right hydronephrosis, ureters poorly visualized on 

this exam and degree of ureteral obstruction by stricture or mass not 

excluded. Urinary bladder is somewhat distended. There is again small left

renal calculus.

2. There has been progression of L2 vertebral body height loss with 

increased osseous retropulsion which contributes to suspected moderate to 

severe spinal stenosis. There is bone demineralization.

3. There is stool distention near splenic flexure.

4. There are some hypodense foci of the kidneys, largest inferior focus on

the right with features of a cyst, other foci too small to further 

accurately characterize.

5. There is again moderate size hiatal hernia.

 

Electronically signed by: Devonte Galaviz MD (10/19/2020 10:32 AM) 

[facility]

## 2020-11-16 ENCOUNTER — HOSPITAL ENCOUNTER (EMERGENCY)
Dept: HOSPITAL 61 - ER | Age: 75
Discharge: HOME | End: 2020-11-16
Payer: COMMERCIAL

## 2020-11-16 VITALS — BODY MASS INDEX: 24.16 KG/M2 | HEIGHT: 65 IN | WEIGHT: 145 LBS

## 2020-11-16 VITALS — SYSTOLIC BLOOD PRESSURE: 144 MMHG | DIASTOLIC BLOOD PRESSURE: 81 MMHG

## 2020-11-16 DIAGNOSIS — Z90.49: ICD-10-CM

## 2020-11-16 DIAGNOSIS — Z88.8: ICD-10-CM

## 2020-11-16 DIAGNOSIS — I11.9: ICD-10-CM

## 2020-11-16 DIAGNOSIS — Z90.89: ICD-10-CM

## 2020-11-16 DIAGNOSIS — G89.29: ICD-10-CM

## 2020-11-16 DIAGNOSIS — R42: ICD-10-CM

## 2020-11-16 DIAGNOSIS — G24.9: Primary | ICD-10-CM

## 2020-11-16 DIAGNOSIS — Z98.890: ICD-10-CM
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
## 2020-11-16 DIAGNOSIS — J45.909: ICD-10-CM

## 2020-11-16 DIAGNOSIS — T43.215A: ICD-10-CM

## 2020-11-16 DIAGNOSIS — Z88.6: ICD-10-CM

## 2020-11-16 DIAGNOSIS — Z88.1: ICD-10-CM

## 2020-11-16 DIAGNOSIS — G43.909: ICD-10-CM

## 2020-11-16 PROCEDURE — 96372 THER/PROPH/DIAG INJ SC/IM: CPT

## 2020-11-16 PROCEDURE — 99285 EMERGENCY DEPT VISIT HI MDM: CPT

## 2020-11-16 NOTE — PHYS DOC
Past Medical History


Past Medical History:  Anemia, Asthma, Heart Disease, Hypertension, Migraines, 

P.U.D.


Additional Past Medical Histor:  CARDIAC STENT PLACED 2015, ulcer, chronic back 

pain


Past Surgical History:  Appendectomy, Cholecystectomy


Additional Past Surgical Histo:  LEFT GREAT TOE CORRECTION, "BACK SURGERY", R 

BREAST BIOPSY, EXPLORATORY ABD


Smoking Status:  Never Smoker


Alcohol Use:  None


Drug Use:  None





General Adult


EDM:


Chief Complaint:  DIZZY/LIGHT HEADED





HPI:


HPI:





Patient is a 75  year old female who presented to ER for evaluation of feeling 

dizzy, shakiness, uneasy after she took 30 mg of Cymbalta that her doctor just 

prescribed for her today.  This is her first time taking the medication.  

Patient denies any seizure, no chest pain, no trouble breathing, no headache, no

nausea vomiting.  She says she has been allergic to a lot of different 

medication in the past.





Review of Systems:


Review of Systems:


Constitutional:   Denies fever or chills. []


Eyes:   Denies change in visual acuity. []


HENT:   Denies nasal congestion or sore throat. [] 


Respiratory:   Denies cough or shortness of breath. [] 


Cardiovascular:   Denies chest pain or edema. [] 


GI:   Denies abdominal pain, nausea, vomiting, bloody stools or diarrhea. [] 


:  Denies dysuria. [] 


Musculoskeletal:   Denies back pain or joint pain. [] 


Integument:   Denies rash. [] 


Neurologic:   Denies headache, focal weakness or sensory changes.  Positive for 

dizziness


Endocrine:   Denies polyuria or polydipsia. [] 


Lymphatic:  Denies swollen glands. [] 


Psychiatric:  Denies depression, positive for shakiness and anxious





Heart Score:


Risk Factors:


Risk Factors:  DM, Current or recent (<one month) smoker, HTN, HLP, family 

history of CAD, obesity.


Risk Scores:


Score 0 - 3:  2.5% MACE over next 6 weeks - Discharge Home


Score 4 - 6:  20.3% MACE over next 6 weeks - Admit for Clinical Observation


Score 7 - 10:  72.7% MACE over next 6 weeks - Early Invasive Strategies





Allergies:


Allergies:





Allergies








Coded Allergies Type Severity Reaction Last Updated Verified


 


  aspirin Allergy Intermediate  4/25/17 Yes


 


  codeine Allergy Intermediate  4/25/17 Yes


 


  diazepam Allergy Intermediate  4/25/17 Yes


 


  ibuprofen Allergy Intermediate  4/25/17 Yes


 


  ketorolac Allergy Intermediate Shortness of Air 8/2/18 Yes


 


  morphine Allergy Intermediate takes ULTRAM at home 8/7/17 Yes


 


  promethazine Allergy Intermediate  4/25/17 Yes











Physical Exam:


PE:





Constitutional: Well developed, well nourished, no acute distress, non-toxic 

appearance. []


HENT: Normocephalic, atraumatic, bilateral external ears normal, oropharynx 

moist, no oral exudates, nose normal. []


Eyes: PERRLA, EOMI, conjunctiva normal, no discharge. [] 


Neck: Normal range of motion, no tenderness, supple, no stridor. [] 


Cardiovascular:Heart rate regular rhythm, no murmur []


Lungs & Thorax:  Bilateral breath sounds clear to auscultation []


Abdomen: Bowel sounds normal, soft, no tenderness, no masses, no pulsatile 

masses. [] 


Skin: Warm, dry, no erythema, no rash. [] 


Back: No tenderness, no CVA tenderness. [] 


Extremities: No tenderness, no cyanosis, no clubbing, ROM intact, no edema. [] 


Neurologic: Alert and oriented X 3, normal motor function, normal sensory 

function, no focal deficits noted. []


Psychologic: Affect normal, judgement normal, mood normal. []





EKG:


EKG:


[]





Radiology/Procedures:


Radiology/Procedures:


[]





Course & Med Decision Making:


Course & Med Decision Making


Pertinent Labs and Imaging studies reviewed. (See chart for details)





Patient is a 74-year-old female who was taking Cymbalta for secondary, developed

 a dystonic reaction, was given medication in ER, she felt much better.  Patient

 WILL  be discharged home.





Dragon Disclaimer:


Dragon Disclaimer:


This electronic medical record was generated, in whole or in part, using a voice

 recognition dictation system.





Departure


Departure


Impression:  


   Primary Impression:  


   Drug reaction


   Additional Impression:  


   Dystonia


Disposition:  01 DC HOME SELF CARE/HOMELESS


Condition:  IMPROVED


Referrals:  


ABDON CHERY MD (PCP)


Patient Instructions:  Drug Allergy, Dystonic Reaction





Additional Instructions:  


DO NOT TAKE CYMBALTA FROM NOW ON.


Scripts


Benztropine Mesylate (BENZTROPINE MESYLATE) 1 Mg Tablet


1 TAB PO BID for 2 Days, #4 TAB


   Prov: SILVINO FIGUEROA DO         11/16/20











SILVINO FIGUEROA DO                Nov 16, 2020 18:10

## 2020-11-19 ENCOUNTER — HOSPITAL ENCOUNTER (EMERGENCY)
Dept: HOSPITAL 61 - ER | Age: 75
Discharge: HOME | End: 2020-11-19
Payer: COMMERCIAL

## 2020-11-19 VITALS — WEIGHT: 160.94 LBS | BODY MASS INDEX: 26.81 KG/M2 | HEIGHT: 65 IN

## 2020-11-19 VITALS — SYSTOLIC BLOOD PRESSURE: 177 MMHG | SYSTOLIC BLOOD PRESSURE: 177 MMHG | DIASTOLIC BLOOD PRESSURE: 84 MMHG

## 2020-11-19 DIAGNOSIS — G89.29: ICD-10-CM

## 2020-11-19 DIAGNOSIS — Z88.5: ICD-10-CM

## 2020-11-19 DIAGNOSIS — J45.909: ICD-10-CM

## 2020-11-19 DIAGNOSIS — I10: ICD-10-CM

## 2020-11-19 DIAGNOSIS — R53.1: Primary | ICD-10-CM

## 2020-11-19 DIAGNOSIS — Z88.6: ICD-10-CM

## 2020-11-19 DIAGNOSIS — Z88.8: ICD-10-CM

## 2020-11-19 DIAGNOSIS — Z20.828: ICD-10-CM

## 2020-11-19 LAB
ALBUMIN SERPL-MCNC: 3.8 G/DL (ref 3.4–5)
ALBUMIN/GLOB SERPL: 1.3 {RATIO} (ref 1–1.7)
ALP SERPL-CCNC: 240 U/L (ref 46–116)
ALT SERPL-CCNC: 24 U/L (ref 14–59)
AMPHETAMINE/METHAMPHETAMINE: (no result)
ANION GAP SERPL CALC-SCNC: 12 MMOL/L (ref 6–14)
ANISOCYTOSIS BLD QL SMEAR: (no result)
APTT PPP: YELLOW S
AST SERPL-CCNC: 13 U/L (ref 15–37)
BACTERIA #/AREA URNS HPF: 0 /HPF
BARBITURATES UR-MCNC: (no result) UG/ML
BASOPHILS # BLD AUTO: 0.1 X10^3/UL (ref 0–0.2)
BASOPHILS NFR BLD: 1 % (ref 0–3)
BENZODIAZ UR-MCNC: (no result) UG/L
BILIRUB SERPL-MCNC: 0.3 MG/DL (ref 0.2–1)
BILIRUB UR QL STRIP: NEGATIVE
BUN SERPL-MCNC: 20 MG/DL (ref 7–20)
BUN/CREAT SERPL: 25 (ref 6–20)
CALCIUM SERPL-MCNC: 8.9 MG/DL (ref 8.5–10.1)
CANNABINOIDS UR-MCNC: (no result) UG/L
CHLORIDE SERPL-SCNC: 106 MMOL/L (ref 98–107)
CK SERPL-CCNC: 96 U/L (ref 26–192)
CO2 SERPL-SCNC: 26 MMOL/L (ref 21–32)
COCAINE UR-MCNC: (no result) NG/ML
CREAT SERPL-MCNC: 0.8 MG/DL (ref 0.6–1)
EOSINOPHIL NFR BLD: 0.1 X10^3/UL (ref 0–0.7)
EOSINOPHIL NFR BLD: 2 % (ref 0–3)
ERYTHROCYTE [DISTWIDTH] IN BLOOD BY AUTOMATED COUNT: 22.4 % (ref 11.5–14.5)
FIBRINOGEN PPP-MCNC: CLEAR MG/DL
GFR SERPLBLD BASED ON 1.73 SQ M-ARVRAT: 85.1 ML/MIN
GLUCOSE SERPL-MCNC: 89 MG/DL (ref 70–99)
HCT VFR BLD CALC: 27.7 % (ref 36–47)
HGB BLD-MCNC: 8.8 G/DL (ref 12–15.5)
HYPOCHROMIA BLD QL SMEAR: SLIGHT
INFLUENZA A PATIENT: NEGATIVE
INFLUENZA B PATIENT: NEGATIVE
LYMPHOCYTES # BLD: 1.5 X10^3/UL (ref 1–4.8)
LYMPHOCYTES NFR BLD AUTO: 23 % (ref 24–48)
MAGNESIUM SERPL-MCNC: 2.5 MG/DL (ref 1.8–2.4)
MCH RBC QN AUTO: 24 PG (ref 25–35)
MCHC RBC AUTO-ENTMCNC: 32 G/DL (ref 31–37)
MCV RBC AUTO: 75 FL (ref 79–100)
METHADONE SERPL-MCNC: (no result) NG/ML
MICROCYTES BLD QL SMEAR: SLIGHT
MONO #: 0.5 X10^3/UL (ref 0–1.1)
MONOCYTES NFR BLD: 8 % (ref 0–9)
NEUT #: 4.4 X10^3/UL (ref 1.8–7.7)
NEUTROPHILS NFR BLD AUTO: 67 % (ref 31–73)
NITRITE UR QL STRIP: NEGATIVE
OPIATES UR-MCNC: (no result) NG/ML
OVALOCYTES BLD QL SMEAR: (no result)
PCP SERPL-MCNC: (no result) MG/DL
PH UR STRIP: 7 [PH]
PLATELET # BLD AUTO: 339 X10^3/UL (ref 140–400)
PLATELET # BLD EST: ADEQUATE 10*3/UL
POIKILOCYTOSIS BLD QL SMEAR: SLIGHT
POTASSIUM SERPL-SCNC: 3.6 MMOL/L (ref 3.5–5.1)
PROT SERPL-MCNC: 6.7 G/DL (ref 6.4–8.2)
PROT UR STRIP-MCNC: NEGATIVE MG/DL
RBC # BLD AUTO: 3.71 X10^6/UL (ref 3.5–5.4)
RBC #/AREA URNS HPF: 0 /HPF (ref 0–2)
SCHISTOCYTES BLD QL SMEAR: (no result)
SODIUM SERPL-SCNC: 144 MMOL/L (ref 136–145)
UROBILINOGEN UR-MCNC: 0.2 MG/DL
WBC # BLD AUTO: 6.7 X10^3/UL (ref 4–11)
WBC #/AREA URNS HPF: (no result) /HPF (ref 0–4)

## 2020-11-19 PROCEDURE — 80053 COMPREHEN METABOLIC PANEL: CPT

## 2020-11-19 PROCEDURE — C9803 HOPD COVID-19 SPEC COLLECT: HCPCS

## 2020-11-19 PROCEDURE — 84443 ASSAY THYROID STIM HORMONE: CPT

## 2020-11-19 PROCEDURE — 83735 ASSAY OF MAGNESIUM: CPT

## 2020-11-19 PROCEDURE — 72125 CT NECK SPINE W/O DYE: CPT

## 2020-11-19 PROCEDURE — 87040 BLOOD CULTURE FOR BACTERIA: CPT

## 2020-11-19 PROCEDURE — 71045 X-RAY EXAM CHEST 1 VIEW: CPT

## 2020-11-19 PROCEDURE — 85025 COMPLETE CBC W/AUTO DIFF WBC: CPT

## 2020-11-19 PROCEDURE — 70450 CT HEAD/BRAIN W/O DYE: CPT

## 2020-11-19 PROCEDURE — U0003 INFECTIOUS AGENT DETECTION BY NUCLEIC ACID (DNA OR RNA); SEVERE ACUTE RESPIRATORY SYNDROME CORONAVIRUS 2 (SARS-COV-2) (CORONAVIRUS DISEASE [COVID-19]), AMPLIFIED PROBE TECHNIQUE, MAKING USE OF HIGH THROUGHPUT TECHNOLOGIES AS DESCRIBED BY CMS-2020-01-R: HCPCS

## 2020-11-19 PROCEDURE — 84145 PROCALCITONIN (PCT): CPT

## 2020-11-19 PROCEDURE — 83605 ASSAY OF LACTIC ACID: CPT

## 2020-11-19 PROCEDURE — 80307 DRUG TEST PRSMV CHEM ANLYZR: CPT

## 2020-11-19 PROCEDURE — 83880 ASSAY OF NATRIURETIC PEPTIDE: CPT

## 2020-11-19 PROCEDURE — 81001 URINALYSIS AUTO W/SCOPE: CPT

## 2020-11-19 PROCEDURE — 84484 ASSAY OF TROPONIN QUANT: CPT

## 2020-11-19 PROCEDURE — 87804 INFLUENZA ASSAY W/OPTIC: CPT

## 2020-11-19 PROCEDURE — 99285 EMERGENCY DEPT VISIT HI MDM: CPT

## 2020-11-19 PROCEDURE — 36415 COLL VENOUS BLD VENIPUNCTURE: CPT

## 2020-11-19 PROCEDURE — 93005 ELECTROCARDIOGRAM TRACING: CPT

## 2020-11-19 PROCEDURE — 82553 CREATINE MB FRACTION: CPT

## 2020-11-19 PROCEDURE — 96360 HYDRATION IV INFUSION INIT: CPT

## 2020-11-19 NOTE — PHYS DOC
Past Medical History


Past Medical History:  Arthritis, Asthma, Hypertension


Additional Past Medical Histor:  CARDIAC STENT PLACED 2015, ulcer, chronic back 

pain


Past Surgical History:  Other


Additional Past Surgical Histo:  LEFT GREAT TOE CORRECTION, "BACK SURGERY", R 

BREAST BIOPSY, EXPLORATORY ABD


Smoking Status:  Never Smoker


Alcohol Use:  None


Drug Use:  None





General Adult


EDM:


Chief Complaint:  GENERALIZED BODY ACHES





HPI:


HPI:





Patient is a 75  year old female with a history of hypertension, kicking leg 

syndrome, who presents to the ED today complaining of generalized weakness, 

patient states symptoms began 10 this morning.  Denies any chest pain, shortness

of breath.  Denies any fever, coughing, congestion.  She states she is on a new 

medicine called Benzotropin for kicking leg syndrome.  She states actually the 

medicine has been helping with her kicking leg syndrome.  She also states she 

had cervical fusion in June and is experiencing pain to her posterior neck.  

Denies any injury.  Denies any numbness or tingling to bilateral upper extremit

ies.





Review of Systems:


Review of Systems:


Constitutional:   Denies fever or chills. []


Eyes:   Denies change in visual acuity. []


HENT:   Denies nasal congestion or sore throat. [] 


Respiratory:   Denies cough or shortness of breath. [] 


Cardiovascular:   Denies chest pain or edema. [] 


GI:   Denies abdominal pain, nausea, vomiting, bloody stools or diarrhea. [] 


:  Denies dysuria. [] 


Musculoskeletal:   Denies back pain or joint pain. [] 


Integument:   Denies rash. [] 


Neurologic: Reports generalized weakness.  Denies headache, focal weakness or 

sensory changes. [] 


Psychiatric:  Denies depression or anxiety. []





Heart Score:


Risk Factors:


Risk Factors:  DM, Current or recent (<one month) smoker, HTN, HLP, family 

history of CAD, obesity.


Risk Scores:


Score 0 - 3:  2.5% MACE over next 6 weeks - Discharge Home


Score 4 - 6:  20.3% MACE over next 6 weeks - Admit for Clinical Observation


Score 7 - 10:  72.7% MACE over next 6 weeks - Early Invasive Strategies





Current Medications:





Current Medications








 Medications


  (Trade)  Dose


 Ordered  Sig/Mayte  Start Time


 Stop Time Status Last Admin


Dose Admin


 


 Acetaminophen


  (Tylenol)  1,000 mg  1X  ONCE  11/19/20 17:30


 11/19/20 17:31 DC 11/19/20 17:30


1,000 MG


 


 Clonidine HCl


  (Catapres)  0.2 mg  1X  ONCE  11/19/20 18:30


 11/19/20 18:31 DC 11/19/20 18:42


0.2 MG


 


 Sodium Chloride  1,000 ml @ 


 1,000 mls/hr  1X  ONCE  11/19/20 17:30


 11/19/20 18:29 DC 11/19/20 17:30


1,000 MLS/HR











Allergies:


Allergies:





Allergies








Coded Allergies Type Severity Reaction Last Updated Verified


 


  aspirin Allergy Intermediate  4/25/17 Yes


 


  codeine Allergy Intermediate  4/25/17 Yes


 


  diazepam Allergy Intermediate  4/25/17 Yes


 


  ibuprofen Allergy Intermediate  4/25/17 Yes


 


  ketorolac Allergy Intermediate Shortness of Air 8/2/18 Yes


 


  morphine Allergy Intermediate takes ULTRAM at home 8/7/17 Yes


 


  promethazine Allergy Intermediate  4/25/17 Yes











Physical Exam:


PE:





Constitutional: Well developed, well nourished, no acute distress, non-toxic 

appearance. []


HENT: Normocephalic, atraumatic, bilateral external ears normal, oropharynx 

moist, no oral exudates, nose normal. []


Eyes: PERRLA, EOMI, conjunctiva normal, no discharge. [] 


Neck: Surgical incision on posterior cervical spine is well approximated, no 

signs of infection, normal range of motion, no tenderness, supple, no stridor. 

[] 


Cardiovascular:Heart rate regular rhythm, no murmur []


Lungs & Thorax:  Bilateral breath sounds clear to auscultation []


Abdomen: Bowel sounds normal, soft, no tenderness, no masses, no pulsatile 

masses. [] 


Skin: Warm, dry, no erythema, no rash. [] 


Back: No tenderness, no CVA tenderness. [] 


Extremities: No tenderness, no cyanosis, no clubbing, ROM intact, no edema. [] 


Neurologic: Alert and oriented X 3, normal motor function, normal sensory 

function, no focal deficits noted. []


Psychologic: Affect normal, judgement normal, mood normal. []





Current Patient Data:


Labs:





                                Laboratory Tests








Test


 11/19/20


17:40


 


White Blood Count


 6.7 x10^3/uL


(4.0-11.0)


 


Red Blood Count


 3.71 x10^6/uL


(3.50-5.40)


 


Hemoglobin


 8.8 g/dL


(12.0-15.5)  L


 


Hematocrit


 27.7 %


(36.0-47.0)  L


 


Mean Corpuscular Volume


 75 fL ()


L


 


Mean Corpuscular Hemoglobin


 24 pg (25-35)


L


 


Mean Corpuscular Hemoglobin


Concent 32 g/dL


(31-37)


 


Red Cell Distribution Width


 22.4 %


(11.5-14.5)  H


 


Platelet Count


 339 x10^3/uL


(140-400)


 


Neutrophils (%) (Auto) 67 % (31-73)  


 


Lymphocytes (%) (Auto) 23 % (24-48)  L


 


Monocytes (%) (Auto) 8 % (0-9)  


 


Eosinophils (%) (Auto) 2 % (0-3)  


 


Basophils (%) (Auto) 1 % (0-3)  


 


Neutrophils # (Auto)


 4.4 x10^3/uL


(1.8-7.7)


 


Lymphocytes # (Auto)


 1.5 x10^3/uL


(1.0-4.8)


 


Monocytes # (Auto)


 0.5 x10^3/uL


(0.0-1.1)


 


Eosinophils # (Auto)


 0.1 x10^3/uL


(0.0-0.7)


 


Basophils # (Auto)


 0.1 x10^3/uL


(0.0-0.2)


 


Platelet Estimate


 Adequate


(ADEQUATE)


 


Hypochromasia Slight  


 


Poikilocytosis Slight  


 


Anisocytosis Mod  


 


Microcytosis Slight  


 


Ovalocytes Few  


 


Schistocytes Occ  


 


Urine Collection Type Unknown  


 


Urine Color Yellow  


 


Urine Clarity Clear  


 


Urine pH


 7.0 (<5.0-8.0)





 


Urine Specific Gravity


 1.010


(1.000-1.030)


 


Urine Protein


 Negative mg/dL


(NEG-TRACE)


 


Urine Glucose (UA)


 Negative mg/dL


(NEG)


 


Urine Ketones (Stick)


 Negative mg/dL


(NEG)


 


Urine Blood


 Negative (NEG)





 


Urine Nitrite


 Negative (NEG)





 


Urine Bilirubin


 Negative (NEG)





 


Urine Urobilinogen Dipstick


 0.2 mg/dL (0.2


mg/dL)


 


Urine Leukocyte Esterase


 Negative (NEG)





 


Urine RBC 0 /HPF (0-2)  


 


Urine WBC


 Occ /HPF (0-4)





 


Urine Squamous Epithelial


Cells Few /LPF  





 


Urine Bacteria


 0 /HPF (0-FEW)





 


Sodium Level


 144 mmol/L


(136-145)


 


Potassium Level


 3.6 mmol/L


(3.5-5.1)


 


Chloride Level


 106 mmol/L


()


 


Carbon Dioxide Level


 26 mmol/L


(21-32)


 


Anion Gap 12 (6-14)  


 


Blood Urea Nitrogen


 20 mg/dL


(7-20)


 


Creatinine


 0.8 mg/dL


(0.6-1.0)


 


Estimated GFR


(Cockcroft-Gault) 85.1  





 


BUN/Creatinine Ratio 25 (6-20)  H


 


Glucose Level


 89 mg/dL


(70-99)


 


Lactic Acid Level


 0.9 mmol/L


(0.4-2.0)


 


Calcium Level


 8.9 mg/dL


(8.5-10.1)


 


Magnesium Level


 2.5 mg/dL


(1.8-2.4)  H


 


Total Bilirubin


 0.3 mg/dL


(0.2-1.0)


 


Aspartate Amino Transferase


(AST) 13 U/L (15-37)


L


 


Alanine Aminotransferase (ALT)


 24 U/L (14-59)





 


Alkaline Phosphatase


 240 U/L


()  H


 


Creatine Kinase


 96 U/L


()


 


Creatine Kinase MB (Mass)


 1.3 ng/mL


(0.0-3.6)


 


Creatine Kinase MB Relative


Index 1.4 % (0-4)  





 


Troponin I Quantitative


 < 0.017 ng/mL


(0.000-0.055)


 


NT-Pro-B-Type Natriuretic


Peptide 312 pg/mL


(0-124)  H


 


Total Protein


 6.7 g/dL


(6.4-8.2)


 


Albumin


 3.8 g/dL


(3.4-5.0)


 


Albumin/Globulin Ratio 1.3 (1.0-1.7)  


 


Procalcitonin


 < 0.10 ng/mL


(0.00-0.10)


 


Thyroid Stimulating Hormone


(TSH) 0.180 uIU/mL


(0.358-3.74)  L


 


Urine Opiates Screen Neg (NEG)  


 


Urine Methadone Screen Neg (NEG)  


 


Urine Barbiturates Neg (NEG)  


 


Urine Phencyclidine Screen Neg (NEG)  


 


Urine


Amphetamine/Methamphetamine Neg (NEG)  





 


Urine Benzodiazepines Screen Neg (NEG)  


 


Urine Cocaine Screen Neg (NEG)  


 


Urine Cannabinoids Screen Pos (NEG)  


 


Urine Ethyl Alcohol Neg (NEG)  


 


Influenza Type A Antigen


 Negative


(NEGATIVE)


 


Influenza Type B Antigen


 Negative


(NEGATIVE)





                                Laboratory Tests


11/19/20 17:40








                                Laboratory Tests


11/19/20 17:40








Vital Signs:





                                   Vital Signs








  Date Time  Temp Pulse Resp B/P (MAP) Pulse Ox O2 Delivery O2 Flow Rate FiO2


 


11/19/20 19:31  68  183/81 (115)    


 


11/19/20 18:00 98.8    100   





 98.8       


 


11/19/20 17:20   18     











EKG:


EKG:


[]





Radiology/Procedures:


Radiology/Procedures:


[]PROCEDURE: CT HEAD AND CERVICAL SPINE WO





Examination: CT HEAD AND CERVICAL SPINE WO


 


History: Reason: neck pain. weakness / Spl. Instructions:  / History: 


 


Comparison/Correlation: None


 


Findings: Axial images of the head and cervical spine were obtained 


without contrast. Sagittal and coronal reformatted images of the cervical 


spine were provided.


 


Atrophy is present. Chronic ischemic changes white matter. No intracranial


hemorrhage, midline shift, or mass effect. Cranial vault is unremarkable.


 


Spinal rods and associated screws are present along the posterior aspect 


of the cervical spine. Left-sided screws associated with a round extend 


from C3 to C5 and on the right, screws are present from C3 to C6. 


Bilateral laminectomy from C4 to C6 noted.


 


Atlantoaxial joint is unremarkable and is present. C2-3 disc space fusion 


is present. Fusion of facet joints also seen at C2-3. This may be 


developmental. C4-5 fusion is present. Correlate with medical history. 


Severe disc space narrowing is present from C5 to C7. Right C4-5 neural 


foraminal narrowing is evident. Soft tissues of neck are unremarkable.


 


 


Impression:


No intracranial hemorrhage.


 


Degenerative changes of the cervical spine.


Postoperative findings of the cervical spine. No acute process.


 


 


 


PQRS Compliance Statement:


 


One or more of the following individualized dose reduction techniques were


utilized for this examination:  


1. Automated exposure control  


2. Adjustment of the mA and/or kV according to patient size  


3. Use of iterative reconstruction technique


 


Electronically signed by: Duane Paul MD (11/19/2020 6:53 PM) [facility]














DICTATED and SIGNED BY:     DUANE PAUL MD


DATE:     11/19/20 [id number]


PROCEDURE: PORTABLE CHEST 1V





Examination: PORTABLE CHEST 1V


 


History: Reason: fever / Spl. Instructions:  / History: 


 


Comparison/Correlation: 04/02/2020 2V chest x-ray exam


 


Findings: Portable upright frontal view of the chest was obtained. 


Postoperative findings of the cervical spine noted. Heart size and 


pulmonary vessels are normal. No infiltrate or effusions. Spinal 


stimulator leads are present overlying the lower thoracic spine. No 


pneumothorax.


 


 


Impression:


No active disease.


 


Electronically signed by: Duane Paul MD (11/19/2020 6:54 PM) [facility]














DICTATED and SIGNED BY:     DUANE PAUL MD


DATE:     11/19/20 [id number] 0





Course & Med Decision Making:


Course & Med Decision Making


Pertinent Labs and Imaging studies reviewed. (See chart for details)





This is a 75-year-old female patient presenting to the ED today complaining of 

generalized weakness that began today.  She is also complaining of posterior 

neck pain, she had a cervical fusion in June 2020.





CT of the head and cervical spine are negative for any acute findings, chest x-

ray is negative, CBC with hemoglobin of 8.8, hematocrit 27.7, patient reports 

history of chronic anemia, denies any bleeding.  Urine analysis negative for 

infection, CMP with no acute findings, troponin is normal, CK is normal.





Patient was discharged to home.  Follow-up with her PCP.  Instructed to contact 

her PCP tomorrow and help with the PCP no she is feeling weak with the 

introduction of benzotropon





Dragon Disclaimer:


Dragon Disclaimer:


This electronic medical record was generated, in whole or in part, using a voice

 recognition dictation system.





Departure


Departure


Impression:  


   Primary Impression:  


   Generalized weakness


Disposition:  01 DC HOME SELF CARE/HOMELESS


Condition:  STABLE


Referrals:  


ABDON CHERY MD (PCP)


Call his office tomorrow and set up a follow-up appointment


Patient Instructions:  Weakness, Easy-to-Read





Additional Instructions:  


You were evaluated in the emergency room, your work-up was negative for any 

acute findings.  Please contact your doctor tomorrow and let him know you were 

in the emergency room today feeling weak.  Do not take your benzotropin today.  

Talk to your doctor tomorrow before you resume this medicine.











JAREN CUNHA              Nov 19, 2020 20:25

## 2020-11-19 NOTE — RAD
Examination: CT HEAD AND CERVICAL SPINE WO

 

History: Reason: neck pain. weakness / Spl. Instructions:  / History: 

 

Comparison/Correlation: None

 

Findings: Axial images of the head and cervical spine were obtained 

without contrast. Sagittal and coronal reformatted images of the cervical 

spine were provided.

 

Atrophy is present. Chronic ischemic changes white matter. No intracranial

hemorrhage, midline shift, or mass effect. Cranial vault is unremarkable.

 

Spinal rods and associated screws are present along the posterior aspect 

of the cervical spine. Left-sided screws associated with a round extend 

from C3 to C5 and on the right, screws are present from C3 to C6. 

Bilateral laminectomy from C4 to C6 noted.

 

Atlantoaxial joint is unremarkable and is present. C2-3 disc space fusion 

is present. Fusion of facet joints also seen at C2-3. This may be 

developmental. C4-5 fusion is present. Correlate with medical history. 

Severe disc space narrowing is present from C5 to C7. Right C4-5 neural 

foraminal narrowing is evident. Soft tissues of neck are unremarkable.

 

 

Impression:

No intracranial hemorrhage.

 

Degenerative changes of the cervical spine.

Postoperative findings of the cervical spine. No acute process.

 

 

 

PQRS Compliance Statement:

 

One or more of the following individualized dose reduction techniques were

utilized for this examination:  

1. Automated exposure control  

2. Adjustment of the mA and/or kV according to patient size  

3. Use of iterative reconstruction technique

 

Electronically signed by: Duane Kwan MD (11/19/2020 6:53 PM) [facility]

## 2020-11-19 NOTE — RAD
Examination: PORTABLE CHEST 1V

 

History: Reason: fever / Spl. Instructions:  / History: 

 

Comparison/Correlation: 04/02/2020 2V chest x-ray exam

 

Findings: Portable upright frontal view of the chest was obtained. 

Postoperative findings of the cervical spine noted. Heart size and 

pulmonary vessels are normal. No infiltrate or effusions. Spinal 

stimulator leads are present overlying the lower thoracic spine. No 

pneumothorax.

 

 

Impression:

No active disease.

 

Electronically signed by: Duane Kwan MD (11/19/2020 6:54 PM) [facility]

## 2020-11-20 NOTE — EKG
[facility]

              [address] [place] [address]

Test Date:    2020               Test Time:    20:53:26

Pat Name:     BRANDON GUERRERO            Department:   

Patient ID:   [id number]           Room:          

Gender:       F                        Technician:   

:          [id number]               Requested By: JAREN CUNHA

Order Number: [id number]           Reading MD:     

                                 Measurements

Intervals                              Axis          

Rate:         83                       P:            12

DC:           144                      QRS:          -3

QRSD:         84                       T:            40

QT:           410                                    

QTc:          488                                    

                           Interpretive Statements

SINUS RHYTHM

ATRIAL PREMATURE COMPLEX(ES)

LEFTWARD AXIS

PROLONGED QT

NO SPECIFIC ECG ABNORMALITIES

RI6.01

Compared to ECG 2020 17:59:24

Left-axis deviation now present

Prolonged QT interval now present

Myocardial infarct finding no longer present

T-wave abnormality no longer present

## 2020-12-13 ENCOUNTER — HOSPITAL ENCOUNTER (EMERGENCY)
Dept: HOSPITAL 61 - ER | Age: 75
Discharge: HOME | End: 2020-12-13
Payer: COMMERCIAL

## 2020-12-13 VITALS
DIASTOLIC BLOOD PRESSURE: 85 MMHG | DIASTOLIC BLOOD PRESSURE: 85 MMHG | DIASTOLIC BLOOD PRESSURE: 85 MMHG | DIASTOLIC BLOOD PRESSURE: 85 MMHG | DIASTOLIC BLOOD PRESSURE: 85 MMHG | SYSTOLIC BLOOD PRESSURE: 177 MMHG | SYSTOLIC BLOOD PRESSURE: 177 MMHG | SYSTOLIC BLOOD PRESSURE: 177 MMHG | SYSTOLIC BLOOD PRESSURE: 177 MMHG

## 2020-12-13 VITALS — BODY MASS INDEX: 24.62 KG/M2 | HEIGHT: 64 IN | WEIGHT: 144.18 LBS

## 2020-12-13 DIAGNOSIS — R53.83: ICD-10-CM

## 2020-12-13 DIAGNOSIS — G89.29: Primary | ICD-10-CM

## 2020-12-13 DIAGNOSIS — Z20.828: ICD-10-CM

## 2020-12-13 DIAGNOSIS — M54.5: ICD-10-CM

## 2020-12-13 DIAGNOSIS — Z88.5: ICD-10-CM

## 2020-12-13 DIAGNOSIS — M19.90: ICD-10-CM

## 2020-12-13 DIAGNOSIS — Z88.8: ICD-10-CM

## 2020-12-13 DIAGNOSIS — R53.1: ICD-10-CM

## 2020-12-13 DIAGNOSIS — I10: ICD-10-CM

## 2020-12-13 DIAGNOSIS — Z88.6: ICD-10-CM

## 2020-12-13 DIAGNOSIS — Z98.890: ICD-10-CM

## 2020-12-13 DIAGNOSIS — J45.909: ICD-10-CM

## 2020-12-13 LAB
ACETAMIN: 3.68 MCG/ML (ref 10–30)
ALBUMIN SERPL-MCNC: 3.7 G/DL (ref 3.4–5)
ALBUMIN/GLOB SERPL: 1.2 {RATIO} (ref 1–1.7)
ALP SERPL-CCNC: 210 U/L (ref 46–116)
ALT SERPL-CCNC: 35 U/L (ref 14–59)
AMPHETAMINE/METHAMPHETAMINE: (no result)
ANION GAP SERPL CALC-SCNC: 7 MMOL/L (ref 6–14)
ANISOCYTOSIS BLD QL SMEAR: (no result)
APTT PPP: YELLOW S
AST SERPL-CCNC: 20 U/L (ref 15–37)
BACTERIA #/AREA URNS HPF: 0 /HPF
BARBITURATES UR-MCNC: (no result) UG/ML
BASOPHILS # BLD AUTO: 0 X10^3/UL (ref 0–0.2)
BASOPHILS NFR BLD: 1 % (ref 0–3)
BENZODIAZ UR-MCNC: (no result) UG/L
BILIRUB SERPL-MCNC: 0.3 MG/DL (ref 0.2–1)
BILIRUB UR QL STRIP: NEGATIVE
BUN SERPL-MCNC: 11 MG/DL (ref 7–20)
BUN/CREAT SERPL: 16 (ref 6–20)
CALCIUM SERPL-MCNC: 9.1 MG/DL (ref 8.5–10.1)
CANNABINOIDS UR-MCNC: (no result) UG/L
CHLORIDE SERPL-SCNC: 106 MMOL/L (ref 98–107)
CO2 SERPL-SCNC: 26 MMOL/L (ref 21–32)
COCAINE UR-MCNC: (no result) NG/ML
CREAT SERPL-MCNC: 0.7 MG/DL (ref 0.6–1)
EOSINOPHIL NFR BLD: 0.1 X10^3/UL (ref 0–0.7)
EOSINOPHIL NFR BLD: 1 % (ref 0–3)
ERYTHROCYTE [DISTWIDTH] IN BLOOD BY AUTOMATED COUNT: 21 % (ref 11.5–14.5)
ETHANOL SERPL-MCNC: < 10 MG/DL (ref 0–10)
FIBRINOGEN PPP-MCNC: CLEAR MG/DL
GFR SERPLBLD BASED ON 1.73 SQ M-ARVRAT: 99.2 ML/MIN
GLUCOSE SERPL-MCNC: 101 MG/DL (ref 70–99)
HCT VFR BLD CALC: 27.9 % (ref 36–47)
HGB BLD-MCNC: 8.8 G/DL (ref 12–15.5)
HYPOCHROMIA BLD QL SMEAR: (no result)
LIPASE: 56 U/L (ref 73–393)
LYMPHOCYTES # BLD: 1.3 X10^3/UL (ref 1–4.8)
LYMPHOCYTES NFR BLD AUTO: 18 % (ref 24–48)
MAGNESIUM SERPL-MCNC: 2.1 MG/DL (ref 1.8–2.4)
MCH RBC QN AUTO: 24 PG (ref 25–35)
MCHC RBC AUTO-ENTMCNC: 32 G/DL (ref 31–37)
MCV RBC AUTO: 74 FL (ref 79–100)
METHADONE SERPL-MCNC: (no result) NG/ML
MICROCYTES BLD QL SMEAR: (no result)
MONO #: 0.5 X10^3/UL (ref 0–1.1)
MONOCYTES NFR BLD: 8 % (ref 0–9)
NEUT #: 5.2 X10^3/UL (ref 1.8–7.7)
NEUTROPHILS NFR BLD AUTO: 73 % (ref 31–73)
NITRITE UR QL STRIP: NEGATIVE
OPIATES UR-MCNC: (no result) NG/ML
PCP SERPL-MCNC: (no result) MG/DL
PH UR STRIP: 7 [PH]
PLATELET # BLD AUTO: 403 X10^3/UL (ref 140–400)
PLATELET # BLD EST: ADEQUATE 10*3/UL
POLYCHROMASIA BLD QL SMEAR: SLIGHT
POTASSIUM SERPL-SCNC: 3.9 MMOL/L (ref 3.5–5.1)
PROT SERPL-MCNC: 6.8 G/DL (ref 6.4–8.2)
PROT UR STRIP-MCNC: NEGATIVE MG/DL
PROTHROMBIN TIME: 13.4 SEC (ref 11.7–14)
RBC # BLD AUTO: 3.75 X10^6/UL (ref 3.5–5.4)
RBC #/AREA URNS HPF: 0 /HPF (ref 0–2)
SALIC: < 2.8 MG/DL (ref 2.8–20)
SODIUM SERPL-SCNC: 139 MMOL/L (ref 136–145)
UROBILINOGEN UR-MCNC: 1 MG/DL
WBC # BLD AUTO: 7.1 X10^3/UL (ref 4–11)
WBC #/AREA URNS HPF: (no result) /HPF (ref 0–4)

## 2020-12-13 PROCEDURE — 85025 COMPLETE CBC W/AUTO DIFF WBC: CPT

## 2020-12-13 PROCEDURE — 80329 ANALGESICS NON-OPIOID 1 OR 2: CPT

## 2020-12-13 PROCEDURE — G0480 DRUG TEST DEF 1-7 CLASSES: HCPCS

## 2020-12-13 PROCEDURE — 83690 ASSAY OF LIPASE: CPT

## 2020-12-13 PROCEDURE — 70450 CT HEAD/BRAIN W/O DYE: CPT

## 2020-12-13 PROCEDURE — 96374 THER/PROPH/DIAG INJ IV PUSH: CPT

## 2020-12-13 PROCEDURE — U0003 INFECTIOUS AGENT DETECTION BY NUCLEIC ACID (DNA OR RNA); SEVERE ACUTE RESPIRATORY SYNDROME CORONAVIRUS 2 (SARS-COV-2) (CORONAVIRUS DISEASE [COVID-19]), AMPLIFIED PROBE TECHNIQUE, MAKING USE OF HIGH THROUGHPUT TECHNOLOGIES AS DESCRIBED BY CMS-2020-01-R: HCPCS

## 2020-12-13 PROCEDURE — 81001 URINALYSIS AUTO W/SCOPE: CPT

## 2020-12-13 PROCEDURE — 80307 DRUG TEST PRSMV CHEM ANLYZR: CPT

## 2020-12-13 PROCEDURE — 80053 COMPREHEN METABOLIC PANEL: CPT

## 2020-12-13 PROCEDURE — 85610 PROTHROMBIN TIME: CPT

## 2020-12-13 PROCEDURE — 36415 COLL VENOUS BLD VENIPUNCTURE: CPT

## 2020-12-13 PROCEDURE — 99285 EMERGENCY DEPT VISIT HI MDM: CPT

## 2020-12-13 PROCEDURE — C9803 HOPD COVID-19 SPEC COLLECT: HCPCS

## 2020-12-13 PROCEDURE — 83880 ASSAY OF NATRIURETIC PEPTIDE: CPT

## 2020-12-13 PROCEDURE — 71045 X-RAY EXAM CHEST 1 VIEW: CPT

## 2020-12-13 PROCEDURE — 83735 ASSAY OF MAGNESIUM: CPT

## 2020-12-13 PROCEDURE — 84443 ASSAY THYROID STIM HORMONE: CPT

## 2020-12-13 PROCEDURE — 84484 ASSAY OF TROPONIN QUANT: CPT

## 2020-12-13 NOTE — RAD
XR CHEST 1V



Clinical indications: Weakness.



COMPARISON: November 19, 2020.



Findings: No acute lung infiltrate or pleural effusion or pulmonary edema or lung mass or pneumothora
x is seen.  The heart size, pulmonary vasculature, mediastinum and both ethan are stable.



Impression: No acute radiographic abnormality is seen.



Electronically signed by: Siddhartha Perry MD (12/13/2020 2:40 PM) [id number]

## 2020-12-13 NOTE — RAD
EXAMINATION: CT HEAD/BRAIN WO (CT HEAD WITHOUT IV CONTRAST)



CLINICAL HISTORY: Weakness



TECHNIQUE: Serial axial images without IV contrast were obtained from the vertex to the foramen magnu
m.



CT Dose Reduction Employed: One or more of the following individualized dose reduction techniques wer
e utilized for this examination:  1. Automated exposure control  2. Adjustment of the mA and/or kV ac
cording to patient size  3. Use of iterative reconstruction technique.



COMPARISON: 11/19/2020





FINDINGS:   



Post-operative change: None.



Acute change: No evidence of an acute infarct or other acute parenchymal process. 

  

Hemorrhage: No evidence of acute intracranial hemorrhage. 



Mass Lesion / Mass Effect: There is no evidence of an intracranial mass or extraaxial fluid collectio
n. No significant mass effect. 

 

Chronic change: Atherosclerotic calcification of the bilateral carotid siphons. 



Parenchyma: There is mild generalized volume loss. The brain parenchyma is otherwise within normal li
mits for age. 



Ventricles: Ventricular enlargement concordant with the degree of parenchymal volume loss. 



Paranasal sinuses and skull base: The visualized paranasal sinuses are grossly clear. The skull base 
and imaged soft tissues are unremarkable.





IMPRESSION:  



No evidence of acute intracranial abnormality or significant interval change.



Electronically signed by: Anuel Diaz DO (12/13/2020 3:16 PM) [facility]

## 2020-12-13 NOTE — PHYS DOC
Past Medical History


Past Medical History:  Arthritis, Asthma, Hypertension, Sciatica


Additional Past Medical Histor:  CARDIAC STENT PLACED 2015, ulcer, chronic back 

pain


Past Surgical History:  Other


Additional Past Surgical Histo:  LEFT GREAT TOE CORRECTION, "BACK SURGERY", R 

BREAST BIOPSY, EXPLORATORY ABD


Smoking Status:  Never Smoker


Alcohol Use:  None


Drug Use:  None





General Adult


EDM:


Chief Complaint:  WEAKNESS/GENERALIZED





HPI:


HPI:





Patient is a 75  year old female with a history of hypertension, asthma, chronic

low back pain with a neurostimulator who presents to the ED today with multiple 

complaints.  Patient is complaining of generalized weakness and fatigue that 

began this morning.  He is also complaining of 7 out of 10 chronic low back pain

nonradiating in nature that she feels is worse this morning.  She states she 

took 1 hydrocodone with no relief.  She describes the pain as sharp and int

ermittent worse obstructive sleeping positions.  Denies any known injury.  

Denies any nausea or vomiting.





Review of Systems:


Review of Systems:


Constitutional: Reports generalized weakness and fatigue.  Denies fever or 

chills. []


Eyes:   Denies change in visual acuity. []


HENT:   Denies nasal congestion or sore throat. [] 


Respiratory:   Denies cough or shortness of breath. [] 


Cardiovascular:   Denies chest pain or edema. [] 


GI:   Denies abdominal pain, nausea, vomiting, bloody stools or diarrhea. [] 


:  Denies dysuria. [] 


Musculoskeletal:   Reports low back pain


Integument:   Denies rash. [] 


Neurologic:   Denies headache, focal weakness or sensory changes. [] 


Psychiatric:  Denies depression or anxiety. []





Heart Score:


Risk Factors:


Risk Factors:  DM, Current or recent (<one month) smoker, HTN, HLP, family 

history of CAD, obesity.


Risk Scores:


Score 0 - 3:  2.5% MACE over next 6 weeks - Discharge Home


Score 4 - 6:  20.3% MACE over next 6 weeks - Admit for Clinical Observation


Score 7 - 10:  72.7% MACE over next 6 weeks - Early Invasive Strategies





Current Medications:





Current Medications








 Medications


  (Trade)  Dose


 Ordered  Sig/Mayte  Start Time


 Stop Time Status Last Admin


Dose Admin


 


 Methylprednisolone


 Sodium Succinate


  (SOLU-Medrol


 125MG VIAL)  125 mg  1X  ONCE  20 14:30


 20 14:31 DC  














Allergies:


Allergies:





Allergies








Coded Allergies Type Severity Reaction Last Updated Verified


 


  aspirin Allergy Intermediate  17 Yes


 


  codeine Allergy Intermediate  17 Yes


 


  diazepam Allergy Intermediate  17 Yes


 


  ibuprofen Allergy Intermediate  17 Yes


 


  ketorolac Allergy Intermediate Shortness of Air 18 Yes


 


  morphine Allergy Intermediate takes ULTRAM at home 17 Yes


 


  promethazine Allergy Intermediate  17 Yes











Physical Exam:


PE:





Constitutional: Tired appearing patient.  Well developed, well nourished, no 

acute distress, non-toxic appearance. []


HENT: Normocephalic, atraumatic, bilateral external ears normal, oropharynx 

moist, no oral exudates, nose normal. []


Eyes: PERRLA, EOMI, conjunctiva normal, no discharge. [] 


Neck: Normal range of motion, no tenderness, supple, no stridor. [] 


Cardiovascular:Heart rate regular rhythm, no murmur []


Lungs & Thorax:  Bilateral breath sounds clear to auscultation []


Abdomen:  Bowel sounds normal, soft, no tenderness, no masses, no pulsatile 

masses. [] 


Skin: Warm, dry, no erythema, no rash. [] 


Back: Old healed surgical incisions noted on the lower lumbar spine.  

Neurostimulator noted on the left lumbar spine.  No midline lumbar spine 

tenderness, no CVA tenderness. [] 


Extremities: No tenderness, no cyanosis, no clubbing, ROM intact, no edema. [] 


Neurologic: Alert and oriented X 3, normal motor function, normal sensory 

function, no focal deficits noted.  Cranial nerves II through XII intact


Psychologic: Affect normal, judgement normal, mood normal. []





Current Patient Data:


Vital Signs:





                                   Vital Signs








  Date Time  Temp Pulse Resp B/P (MAP) Pulse Ox O2 Delivery O2 Flow Rate FiO2


 


20 14:16 98.9 89 20 210/79 (122) 96 Room Air  





 98.9       











EKG:


EK interpreted by Dr. Zabala sinus rhythm HR 61 no STEMI[]





Radiology/Procedures:


Radiology/Procedures:


PROCEDURE: CT HEAD WO CONTRAST





EXAMINATION: CT HEAD/BRAIN WO (CT HEAD WITHOUT IV CONTRAST)





CLINICAL HISTORY: Weakness





TECHNIQUE: Serial axial images without IV contrast were obtained from the vertex

 to the foramen magnum.





CT Dose Reduction Employed: One or more of the following individualized dose 

reduction techniques were utilized for this examination:  1. Automated exposure 

control  2. Adjustment of the mA and/or kV according to patient size  3. Use of 

iterative reconstruction technique.





COMPARISON: 2020








FINDINGS:   





Post-operative change: None.





Acute change: No evidence of an acute infarct or other acute parenchymal 

process. 


  


Hemorrhage: No evidence of acute intracranial hemorrhage. 





Mass Lesion / Mass Effect: There is no evidence of an intracranial mass or 

extraaxial fluid collection. No significant mass effect. 


 


Chronic change: Atherosclerotic calcification of the bilateral carotid siphons. 





Parenchyma: There is mild generalized volume loss. The brain parenchyma is 

otherwise within normal limits for age. 





Ventricles: Ventricular enlargement concordant with the degree of parenchymal 

volume loss. 





Paranasal sinuses and skull base: The visualized paranasal sinuses are grossly 

clear. The skull base and imaged soft tissues are unremarkable.








IMPRESSION:  





No evidence of acute intracranial abnormality or significant interval change.





Electronically signed by: Anuel Joshi DO (2020 3:16 PM) [facility]














DICTATED and SIGNED BY:     ANUEL JOSHI DO


DATE:     20 [id number] 0


PROCEDURE: PORTABLE CHEST 1V





XR CHEST 1V





Clinical indications: Weakness.





COMPARISON: 2020.





Findings: No acute lung infiltrate or pleural effusion or pulmonary edema or 

lung mass or pneumothorax is seen.  The heart size, pulmonary vasculature, 

mediastinum and both ethan are stable.





Impression: No acute radiographic abnormality is seen.





Electronically signed by: Emily Perry MD (2020 2:40 PM) [id number]














DICTATED and SIGNED BY:     EMILY PERRY MD


DATE:     20 [id number] 0





Course & Med Decision Making:


Course & Med Decision Making


Pertinent Labs and Imaging studies reviewed. (See chart for details)





This is a 75-year-old female patient presented to the ED today complaining of 

generalized weakness and fatigue since this morning she appears sleepy and 

reports taking hydrocodone prior to coming to the ED.  Also complaining of c

hronic low back pain.





Patient had a full work-up in the ED, EKG is negative, labs are negative for any

 acute findings.  UA is negative, CT of the head, chest x-ray were all negative 

for any acute findings.





She was discharged home.  Follow-up with her doctor in the course of this week.





Dragon Disclaimer:


Dragon Disclaimer:


This electronic medical record was generated, in whole or in part, using a voice

 recognition dictation system.





Departure


Departure


Impression:  


   Primary Impression:  


   Chronic back pain


   Qualified Codes:  M54.5 - Low back pain; G89.29 - Other chronic pain


   Additional Impressions:  


   Generalized weakness


   Fatigue


   Qualified Codes:  R53.83 - Other fatigue


   Person under investigation for COVID-19


Disposition:  01 DC HOME SELF CARE/HOMELESS


Condition:  STABLE


Referrals:  


ABDON CHERY MD (PCP)


follow up with your doctor in one week


Patient Instructions:  Back Pain, Adult, Fatigue, Weakness, Easy-to-Read





Additional Instructions:  


You were evaluated in the emergency room, your work-up was negative for any 

acute findings.  Please follow-up with your own doctor in the course of this 

week.  You were tested for COVID-19.  Quarantine yourself until you get results 

from us.  You can also follow-up with your doctor for back pain.











JAREN CUNHA APRN              Dec 13, 2020 14:37

## 2021-01-05 ENCOUNTER — HOSPITAL ENCOUNTER (OUTPATIENT)
Dept: HOSPITAL 61 - KCIC MRI | Age: 76
End: 2021-01-05
Attending: FAMILY MEDICINE
Payer: COMMERCIAL

## 2021-01-05 DIAGNOSIS — M43.22: ICD-10-CM

## 2021-01-05 DIAGNOSIS — M48.02: ICD-10-CM

## 2021-01-05 DIAGNOSIS — Z12.31: Primary | ICD-10-CM

## 2021-01-05 DIAGNOSIS — M47.812: ICD-10-CM

## 2021-01-05 PROCEDURE — 77067 SCR MAMMO BI INCL CAD: CPT

## 2021-01-05 PROCEDURE — 77063 BREAST TOMOSYNTHESIS BI: CPT

## 2021-01-05 PROCEDURE — 72141 MRI NECK SPINE W/O DYE: CPT

## 2021-01-05 NOTE — KCIC
MRI of the cervical spine without contrast 1/5/2021



CLINICAL HISTORY: Neck pain. Paresthesias involving both hands. History of previous cervical fusion.



TECHNIQUE: Unenhanced T1-weighted, T2-weighted and inversion recovery sagittal and gradient echo and 
T2-weighted axial images of the cervical spine were obtained.



FINDINGS: Comparison study is dated 4/26/2017. Additional comparison is made to a CT scan of the cerv
ical spine dated 11/19/2020.



Mild lateral curvature of the cervical spine is seen convex to the left. There is reversal of the nor
mal cervical lordosis. The patient is post laminectomy and posterolateral fusion using pedicle screws
 and stabilizing rods which extend from C3 to C6 on the right and C4 to C6 on the left. The patient a
ppears to be post anterior discectomy and fusion using bone graft material at C4-5. Degenerative sign
al changes and loss of height are seen involving the remaining discs of the cervical spine. Degenerat
adali signal changes are seen within the marrow surrounding these discs. Symmetric paracentral areas of
 abnormally increased signal intensity are seen involving the cervical spinal cord at the C4 level wh
ich measure 3 to 4 mm in size and are consistent with areas of myelomalacia. No additional area of ab
normal signal intensity is seen involving the cervical spinal cord.



At the C2-3 disc space there is a minimal generalized disc bulge. Degenerative changes are seen invol
ving the uncovertebral and facet joints, right greater than left. These findings do not result in sig
nificant central spinal canal stenosis. Mild to moderate right greater than left neural foraminal nicanor
nosis is seen.



At the C3-4 disc space there is a mild generalized disc bulge. Degenerative changes are seen involvin
g the uncovertebral and facet joints, left greater than right. These findings result in mild central 
spinal canal stenosis without evidence of cord impingement. Mild to moderate left greater than right 
neural foraminal stenosis is seen.



At the C4-5 level degenerative changes are seen involving the uncovertebral and facet joints bilatera
lly. These findings do not result in significant central spinal canal stenosis. Mild bilateral neural
 foraminal stenosis is seen.



At the C5-6 disc space there is a mild generalized disc bulge. Degenerative changes are seen involvin
g the uncovertebral and facet joints, right greater than left. These findings do not result in signif
icant central spinal canal stenosis. Mild right neural foraminal stenosis is seen. The left neural fo
ramen is patent.



At the C6-7 disc space there is a mild generalized disc bulge. Degenerative changes are seen involvin
g the uncovertebral and facet joints bilaterally. These findings do not result in significant central
 spinal canal stenosis. Mild to moderate bilateral neural foraminal stenosis is seen.



At the C7-T1 disc space there is a mild generalized disc bulge. Degenerative changes are seen involvi
ng the facet joints bilaterally. These findings do not result in significant central spinal canal or 
neural foraminal stenosis.



IMPRESSION:

1. Postsurgical changes are seen involving the cervical spine as discussed above.

2. Areas of myelomalacia are seen involving the cervical spinal cord centered at the C4 level.

3. Degenerative changes are seen throughout the cervical spine. These findings result in mild central
 spinal canal stenosis at C3-4 without evidence of cord impingement. Multilevel neural foraminal sten
osis is seen as discussed above.



Electronically signed by: Nathaniel Willingham MD (1/5/2021 4:03 PM) [id number]

## 2021-01-08 NOTE — KCIC
Bilateral digital screening mammograms and tomosynthesis



Reason for examination: Routine screening. 



Comparisons: No priors available. Current exam is a new baseline.



Routine CC and MLO digital views obtained. Interpretation was made with the benefit of CAD.



The skin and nipples show no abnormalities. No abnormal lymph nodes are seen. The breast parenchyma i
s scattered fibroglandular elements. (Breast density: Category B.) There are no suspicious masses, roach
spicious calcifications or architectural distortions. Benign calcifications.



Impression:



Negative mammogram. Recommend routine screening.



BI-RADS Category 1:  Negative.



"Our facility is accredited by the [employer] Mammography Program."



This patient's information has been entered into a reminder system for the patient to be notified wit
h the results of her examination and a target date for the next mammogram.



Electronically signed by: Nickolas Lopez MD (1/8/2021 5:23 PM) [id number]

## 2021-01-12 ENCOUNTER — HOSPITAL ENCOUNTER (EMERGENCY)
Dept: HOSPITAL 61 - ER | Age: 76
Discharge: LEFT BEFORE DISCHARGE | End: 2021-01-12
Payer: COMMERCIAL

## 2021-01-12 DIAGNOSIS — R20.2: Primary | ICD-10-CM

## 2021-01-12 DIAGNOSIS — Z53.21: ICD-10-CM

## 2021-03-17 ENCOUNTER — HOSPITAL ENCOUNTER (OUTPATIENT)
Dept: HOSPITAL 61 - US | Age: 76
End: 2021-03-17
Attending: PODIATRIST
Payer: COMMERCIAL

## 2021-03-17 DIAGNOSIS — I70.213: Primary | ICD-10-CM

## 2021-03-17 PROCEDURE — 93925 LOWER EXTREMITY STUDY: CPT

## 2021-03-18 NOTE — RAD
Bilateral lower extremity arterial duplex ultrasound study without comparison for cramping in the leg
s, nonpalpable pedal pulses.



TECHNIQUE: Real-time grayscale and color and spectral Doppler evaluation of the arteries of the lower
 extremities is performed. There is multifocal calcified atherosclerosis bilaterally. All vessels are
 patent. On the right, there is biphasic flow in all interrogated distributions. There is elevated pe
ak systolic velocity within the right common femoral artery which could signify stenosis in this dist
ribution, though no downstream spectral Doppler changes are evident to corroborate. No other focal ve
locity elevations are identified on the right. On the left, all waveforms are biphasic, and there is 
elevated velocity within the dorsalis pedis artery likely indicative of a high-grade stenosis at this
 level. No other focal velocity elevations are identified.



IMPRESSION:

1. Multifocal calcified atherosclerosis in virtually all arteries, with high-grade stenosis of the le
ft dorsalis pedis artery, and possible stenosis of the right common femoral artery based on elevated 
velocities.



Electronically signed by: Rosales Salmon MD (3/18/2021 9:46 AM) [id number]

## 2021-06-25 ENCOUNTER — HOSPITAL ENCOUNTER (OUTPATIENT)
Dept: HOSPITAL 61 - LAB | Age: 76
End: 2021-06-25
Attending: FAMILY MEDICINE
Payer: COMMERCIAL

## 2021-06-25 DIAGNOSIS — D50.9: ICD-10-CM

## 2021-06-25 DIAGNOSIS — R53.1: Primary | ICD-10-CM

## 2021-06-25 LAB
BASOPHILS # BLD AUTO: 0 X10^3/UL (ref 0–0.2)
BASOPHILS NFR BLD: 1 % (ref 0–3)
EOSINOPHIL NFR BLD: 0.1 X10^3/UL (ref 0–0.7)
EOSINOPHIL NFR BLD: 2 % (ref 0–3)
ERYTHROCYTE [DISTWIDTH] IN BLOOD BY AUTOMATED COUNT: 16.1 % (ref 11.5–14.5)
HCT VFR BLD CALC: 36.9 % (ref 36–47)
HGB BLD-MCNC: 12 G/DL (ref 12–15.5)
LYMPHOCYTES # BLD: 1.4 X10^3/UL (ref 1–4.8)
LYMPHOCYTES NFR BLD AUTO: 29 % (ref 24–48)
MCH RBC QN AUTO: 31 PG (ref 25–35)
MCHC RBC AUTO-ENTMCNC: 32 G/DL (ref 31–37)
MCV RBC AUTO: 96 FL (ref 79–100)
MONO #: 0.4 X10^3/UL (ref 0–1.1)
MONOCYTES NFR BLD: 8 % (ref 0–9)
NEUT #: 2.9 X10^3/UL (ref 1.8–7.7)
NEUTROPHILS NFR BLD AUTO: 61 % (ref 31–73)
PLATELET # BLD AUTO: 335 X10^3/UL (ref 140–400)
RBC # BLD AUTO: 3.86 X10^6/UL (ref 3.5–5.4)
WBC # BLD AUTO: 4.8 X10^3/UL (ref 4–11)

## 2021-06-25 PROCEDURE — 36415 COLL VENOUS BLD VENIPUNCTURE: CPT

## 2021-06-25 PROCEDURE — 85025 COMPLETE CBC W/AUTO DIFF WBC: CPT

## 2021-06-25 PROCEDURE — 83540 ASSAY OF IRON: CPT

## 2021-06-25 PROCEDURE — 82607 VITAMIN B-12: CPT

## 2021-06-25 PROCEDURE — 83550 IRON BINDING TEST: CPT

## 2021-09-02 ENCOUNTER — HOSPITAL ENCOUNTER (EMERGENCY)
Dept: HOSPITAL 61 - ER | Age: 76
Discharge: HOME | End: 2021-09-02
Payer: COMMERCIAL

## 2021-09-02 VITALS
SYSTOLIC BLOOD PRESSURE: 180 MMHG | DIASTOLIC BLOOD PRESSURE: 80 MMHG | SYSTOLIC BLOOD PRESSURE: 180 MMHG | DIASTOLIC BLOOD PRESSURE: 80 MMHG

## 2021-09-02 VITALS — HEIGHT: 64 IN | BODY MASS INDEX: 25.59 KG/M2 | WEIGHT: 149.91 LBS
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
## 2021-09-02 DIAGNOSIS — M25.551: ICD-10-CM

## 2021-09-02 DIAGNOSIS — I10: ICD-10-CM

## 2021-09-02 DIAGNOSIS — M19.90: ICD-10-CM

## 2021-09-02 DIAGNOSIS — Z88.8: ICD-10-CM

## 2021-09-02 DIAGNOSIS — Z88.5: ICD-10-CM

## 2021-09-02 DIAGNOSIS — Z88.6: ICD-10-CM

## 2021-09-02 DIAGNOSIS — M17.11: Primary | ICD-10-CM
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
## 2021-09-02 DIAGNOSIS — M25.561: ICD-10-CM
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
## 2021-09-02 DIAGNOSIS — J45.909: ICD-10-CM

## 2021-09-02 PROCEDURE — 99284 EMERGENCY DEPT VISIT MOD MDM: CPT

## 2021-09-02 PROCEDURE — 73501 X-RAY EXAM HIP UNI 1 VIEW: CPT

## 2021-09-02 PROCEDURE — 73562 X-RAY EXAM OF KNEE 3: CPT

## 2021-09-02 NOTE — RAD
Right knee 3 views, right hip 2 views.



HISTORY: Fell, limited mobility



Right knee



3 views were taken of the right knee. There is osteoarthritis with joint space narrowing in the bean
lofemoral compartment and medial joint compartment with hypertrophic spurring. There is no acute frac
ture or joint effusion.



Right hip



AP view was taken of the pelvis to include the right hip with an additional lateral view of the right
 hip. There is no pelvic fracture. Patient had previous lower lumbar spine fusion. A left hip fractur
e is not evident. There is no right hip fracture or acute osseous abnormality in the right hip.



IMPRESSION:

1. No pelvic fracture noted.

2. No right hip fracture noted.

3. Osteoarthritis right knee, no right knee fracture noted.



Electronically signed by: Best Acosta MD (9/2/2021 2:04 PM) [facility]

## 2021-09-02 NOTE — PHYS DOC
Past Medical History


Past Medical History:  Arthritis, Asthma, Hypertension, Sciatica


Additional Past Medical Histor:  CARDIAC STENT PLACED 2015, ulcer, chronic back 

pain


 (LEXYAUDIEJAREN BANDA APRN)


Past Surgical History:  No Surgical History


Additional Past Surgical Histo:  LEFT GREAT TOE CORRECTION, "BACK SURGERY", R 

BREAST BIOPSY, EXPLORATORY ABD


 (JAREN CUHNA SOLO APRN)


Smoking Status:  Never Smoker


Alcohol Use:  None


Drug Use:  None


 (JAHAIRAJAREN MOYA APRN)





General Adult


EDM:


Chief Complaint:  HIP PAIN





HPI:


HPI:





Patient is a 76  year old female who presents to the ED today complaining of 5 

out of 10 right hip and right knee pain, symptoms began last night after she 

slid out of her rocking chair and fell landing on her right knee.  Patient 

denies any loss of consciousness.  Patient denies any neck pain.  Denies hitting

her head on the ground.  States most of her pain is on ambulation but reports 

being able to get up and ambulate.  She states she uses a cane chronically.  

Denies anything specifically relieving her pain.  Describes her pain as sharp 

and intermittent


 (ALPHONSEJAREN BANDA APRN)





Review of Systems:


Review of Systems:


Constitutional:   Denies fever or chills. []


Musculoskeletal:   Reports right knee and right hip pain.  Denies any back pain


Integument:   Denies rash. [] 


Neurologic:   Denies headache, focal weakness or sensory changes. [] [] 


Psychiatric:  Denies depression or anxiety. []


 (JAHAIRAJAREN MOYA APRN)





Heart Score:


C/O Chest Pain:  N/A


Risk Factors:


Risk Factors:  DM, Current or recent (<one month) smoker, HTN, HLP, family 

history of CAD, obesity.


Risk Scores:


Score 0 - 3:  2.5% MACE over next 6 weeks - Discharge Home


Score 4 - 6:  20.3% MACE over next 6 weeks - Admit for Clinical Observation


Score 7 - 10:  72.7% MACE over next 6 weeks - Early Invasive Strategies


 (JAREN CUNHA APRN)





Allergies:


Allergies:





Allergies








Coded Allergies Type Severity Reaction Last Updated Verified


 


  aspirin Allergy Intermediate  4/25/17 Yes


 


  codeine Allergy Intermediate  4/25/17 Yes


 


  diazepam Allergy Intermediate  4/25/17 Yes


 


  ibuprofen Allergy Intermediate  4/25/17 Yes


 


  ketorolac Allergy Intermediate Shortness of Air 8/2/18 Yes


 


  morphine Allergy Intermediate takes ULTRAM at home 8/7/17 Yes


 


  promethazine Allergy Intermediate  4/25/17 Yes








 (JAREN CUNHA APRN)





Physical Exam:


PE:





Constitutional: Well developed, well nourished, no acute distress, non-toxic 

appearance. []


Skin: Warm, dry, no erythema, no rash. [] 


Back: No tenderness, no CVA tenderness. [] 


Extremities: Right knee with no obvious deformity, right hip with no obvious 

deformity.  No tenderness on palpation of the knee or the hip.  Full passive   

range of motion to the right hip including internal rotation, external rotation,

 extension and flexion of the hip.  Full range of motion to the right knee, 

negative Veronika sign, negative Lachman sign, negative anterior posterior 

drawer sign.  +2 right pedal pulse.  Cap refill less than 2 seconds to the right

 lower extremity


Neurologic: Alert and oriented X 3, normal motor function, normal sensory 

function, no focal deficits noted. []


Psychologic: Affect normal, judgement normal, mood normal. []


 (JAREN CUNHA APRN)





Current Patient Data:


Vital Signs:





                                   Vital Signs








  Date Time  Temp Pulse Resp B/P (MAP) Pulse Ox O2 Delivery O2 Flow Rate FiO2


 


9/2/21 13:23 98.4  16 180/80 100 Room Air  





 98.4       








 (JAREN CUNHA APRN)





EKG:


EKG:


[]


 (JAREN CUNHA APRN)





Radiology/Procedures:


Radiology/Procedures:


[]PROCEDURE: KNEE RIGHT 3V





Right knee 3 views, right hip 2 views.





HISTORY: Fell, limited mobility





Right knee





3 views were taken of the right knee. There is osteoarthritis with joint space 

narrowing in the patellofemoral compartment and medial joint compartment with 

hypertrophic spurring. There is no acute fracture or joint effusion.





Right hip





AP view was taken of the pelvis to include the right hip with an additional 

lateral view of the right hip. There is no pelvic fracture. Patient had previous

 lower lumbar spine fusion. A left hip fracture is not evident. There is no 

right hip fracture or acute osseous abnormality in the right hip.





IMPRESSION:


1. No pelvic fracture noted.


2. No right hip fracture noted.


3. Osteoarthritis right knee, no right knee fracture noted.





Electronically signed by: Best Acosta MD (9/2/2021 2:04 PM) [facility]














DICTATED and SIGNED BY:     BEST ACOSTA MD


DATE:     09/02/21 [id number] 0


 (JAREN CUNHA)





Course & Med Decision Making:


Course & Med Decision Making


Pertinent Labs and Imaging studies reviewed. (See chart for details)





This is a 76-year-old female patient presented to the ED today with right hip 

and right knee pain that began yesterday after she fell.  Right hip x-rays 

including pelvis as well as right knee x-rays are negative for any acute 

findings, noted for DJD on the right knee.  Discharge to home.  Ice elevation 

encouraged.  OTC pain relievers recommended.  Follow-up with PCP orthopedic 

doctor in 1 week if pain persist





 (JAREN CUNHA)


Course & Med Decision Making


I have reviewed and was available for consultation in the emergency department 

for this patient that was seen by midlevel provider. Agree with plan.





Ingrid Juárez DO


 (INGRID JUÁREZ DO)


Nas Disclaimer:


Dragon Disclaimer:


This electronic medical record was generated, in whole or in part, using a voice

 recognition dictation system.


 (JAREN CUNHA)





Departure


Departure


Impression:  


   Primary Impression:  


   Fall


   Qualified Codes:  W19.XXXA - Unspecified fall, initial encounter


   Additional Impressions:  


   Right knee pain


   Qualified Codes:  M25.561 - Pain in right knee


   Right hip pain


   Right knee DJD


   Qualified Codes:  M17.11 - Unilateral primary osteoarthritis, right knee


Disposition:  01 HOME / SELF CARE / HOMELESS


Condition:  STABLE


Referrals:  


ABDON CHERY MD (PCP)


Follow-up in 1 to 2 weeks





SOILA MARTINEZ MD


Follow-up in 1 to 2 weeks


Patient Instructions:  Fall Prevention and Home Safety, Hip Pain, Knee Pain, 

Easy-to-Read





Additional Instructions:  


You were seen for right hip and right knee pain, your right hip and right knee 

x-rays are negative for any acute findings.  You have arthritis in your right 

knee.  Try to ice and elevate the affected areas you can take over-the-counter 

pain relievers.  Please follow-up with orthopedic doctor or your own primary 

care doctor in 1 week if pain persists











JAREN CUNHA             Sep 2, 2021 14:47


INGRID JUÁREZ DO              Sep 2, 2021 16:06

## 2021-09-02 NOTE — RAD
Right knee 3 views, right hip 2 views.



HISTORY: Fell, limited mobility



Right knee



3 views were taken of the right knee. There is osteoarthritis with joint space narrowing in the bean
lofemoral compartment and medial joint compartment with hypertrophic spurring. There is no acute frac
ture or joint effusion.



Right hip



AP view was taken of the pelvis to include the right hip with an additional lateral view of the right
 hip. There is no pelvic fracture. Patient had previous lower lumbar spine fusion. A left hip fractur
e is not evident. There is no right hip fracture or acute osseous abnormality in the right hip.



IMPRESSION:

1. No pelvic fracture noted.

2. No right hip fracture noted.

3. Osteoarthritis right knee, no right knee fracture noted.



Electronically signed by: Best Acosta MD (9/2/2021 2:04 PM) [facility]

## 2021-09-28 ENCOUNTER — HOSPITAL ENCOUNTER (OUTPATIENT)
Dept: HOSPITAL 61 - KCIC | Age: 76
End: 2021-09-28
Attending: FAMILY MEDICINE
Payer: COMMERCIAL

## 2021-09-28 DIAGNOSIS — M16.0: Primary | ICD-10-CM

## 2021-09-28 DIAGNOSIS — M43.28: ICD-10-CM

## 2021-09-28 DIAGNOSIS — M85.88: ICD-10-CM

## 2021-09-28 PROCEDURE — 73501 X-RAY EXAM HIP UNI 1 VIEW: CPT

## 2021-09-28 NOTE — KCIC
EXAM:  XR HIP (WITH OR WITHOUT PELVIS) RIGHT 1 VIEW 9/28/2021 11:05 AM



CLINICAL INDICATION:  Right hip pain for over one month



COMPARISON:  Right hip radiograph 9/2/2021



TECHNIQUE:  2 views of the pelvis and right hip



FINDINGS:  The bones are diffusely demineralized. There is no displaced fracture. There is bilateral 
medial joint space narrowing of the hips with small osteophytes. Bilateral sacroiliac joint fusion. T
here is lower lumbar fusion hardware in the spinal stimulator, incompletely evaluated.



IMPRESSION:  

1. No acute osseous abnormality.

2. Osteopenia.

3. 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

4. Fusion of the sacroiliac joints bilaterally.



Electronically signed by: Demi Scott MD (9/28/2021 6:36 PM) [id number]

## 2022-03-18 ENCOUNTER — HOSPITAL ENCOUNTER (EMERGENCY)
Dept: HOSPITAL 61 - ER | Age: 77
Discharge: HOME | End: 2022-03-18
Payer: MEDICARE

## 2022-03-18 VITALS — WEIGHT: 150.36 LBS | HEIGHT: 65 IN | BODY MASS INDEX: 25.05 KG/M2

## 2022-03-18 VITALS — SYSTOLIC BLOOD PRESSURE: 171 MMHG | DIASTOLIC BLOOD PRESSURE: 82 MMHG
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
## 2022-03-18 DIAGNOSIS — G89.11: ICD-10-CM

## 2022-03-18 DIAGNOSIS — G89.29: ICD-10-CM

## 2022-03-18 DIAGNOSIS — M25.551: ICD-10-CM
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
## 2022-03-18 DIAGNOSIS — Z95.5: ICD-10-CM
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
## 2022-03-18 DIAGNOSIS — J45.909: ICD-10-CM

## 2022-03-18 DIAGNOSIS — M25.561: Primary | ICD-10-CM

## 2022-03-18 DIAGNOSIS — I10: ICD-10-CM
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
## 2022-03-18 PROCEDURE — 99283 EMERGENCY DEPT VISIT LOW MDM: CPT

## 2022-03-18 PROCEDURE — 73562 X-RAY EXAM OF KNEE 3: CPT

## 2022-03-18 RX ADMIN — HYDROCODONE BITARTRATE AND ACETAMINOPHEN ONE TAB: 5; 325 TABLET ORAL at 15:12

## 2022-03-18 NOTE — RAD
XR KNEE 3 [id number]



History: Reason: pain / Spl. Instructions:  / History: 



Technique: 2 views right knee



Comparison: September 2, 2021



Findings:

No dislocation. No acute fracture. Advanced right knee degenerative changes most prominent within the
 medial compartment. Basilar calcifications. No significant knee joint effusion.



Impression: 

1.  Advanced right knee DJD, similar compared to prior.



Electronically signed by: Vasyl Pizarro DO (3/18/2022 3:30 PM) [id number]

## 2022-03-18 NOTE — PHYS DOC
Past Medical History


Past Medical History:  Arthritis, Asthma, Hypertension, Sciatica


Additional Past Medical Histor:  CARDIAC STENT PLACED 2015, ulcer, chronic back 

pain


Past Surgical History:  Other


Additional Past Surgical Histo:  LEFT GREAT TOE CORRECTION, "BACK SURGERY", R 

BREAST BIOPSY, EXPLORATORY ABD


Smoking Status:  Never Smoker


Alcohol Use:  None


Drug Use:  None





General Adult


EDM:


Chief Complaint:  LOWER EXT PAIN





HPI:


HPI:





Patient is a 76  year old female who presents after a fall 4 weeks ago.  Patient

states that she was at home when she tripped and fell down concrete stairs.  

Patient states that she was seen at [facility] at that time and had x-rays 

of her hip and her knee.  Patient reports that x-rays were unremarkable.  

Patient is stating that she is still having large amount of pain to her right 

knee that is worse with ambulation.  Denies all other pain or injury.  Patient's

been taking Tylenol at home with little relief.  Patient has not followed up 

with her PCP since incident occurred.  History of asthma, hypertension.  

Up-to-date on immunizations.





Review of Systems:


Review of Systems:


ROS


At least 10 ROS systems have been reviewed and are negative except as documented

in the HPI.


General: Negative except as outlined in HPI above.


Skin: Negative except as outlined in HPI above.


HEENT: Negative except as outlined in HPI above.


Neck: Negative except as outlined in HPI above.


Respiratory: Negative except as outlined in HPI above..


Cardiovascular: Negative except as outlined in HPI above.


Abdomen: Negative except as outlined in HPI above.


: Negative except as outlined in HPI above.


Back/MSK: Negative except as outlined in HPI above.


Neuro: Negative except as outlined in HPI above.


Psych: Negative except as outlined in HPI above.





Heart Score:


C/O Chest Pain:  No


Risk Factors:


Risk Factors:  DM, Current or recent (<one month) smoker, HTN, HLP, family 

history of CAD, obesity.


Risk Scores:


Score 0 - 3:  2.5% MACE over next 6 weeks - Discharge Home


Score 4 - 6:  20.3% MACE over next 6 weeks - Admit for Clinical Observation


Score 7 - 10:  72.7% MACE over next 6 weeks - Early Invasive Strategies





Current Medications:





Current Medications








 Medications


  (Trade)  Dose


 Ordered  Sig/Mayte  Start Time


 Stop Time Status Last Admin


Dose Admin


 


 Acetaminophen/


 Hydrocodone Bitart


  (Lortab 5/325)  1 tab  1X  ONCE  3/18/22 15:15


 3/18/22 15:16 DC 3/18/22 15:12


1 TAB











Allergies:


Allergies:





Allergies








Coded Allergies Type Severity Reaction Last Updated Verified


 


  aspirin Allergy Intermediate  4/25/17 Yes


 


  codeine Allergy Intermediate  4/25/17 Yes


 


  diazepam Allergy Intermediate  4/25/17 Yes


 


  ibuprofen Allergy Intermediate  4/25/17 Yes


 


  ketorolac Allergy Intermediate Shortness of Air 8/2/18 Yes


 


  morphine Allergy Intermediate takes ULTRAM at home 8/7/17 Yes


 


  promethazine Allergy Intermediate  4/25/17 Yes











Physical Exam:


PE:





Constitutional: Well developed, well nourished, no acute distress, non-toxic 

appearance. []


HENT: Normocephalic, atraumatic, bilateral external ears normal, oropharynx 

moist, no oral exudates, nose normal. []


Eyes: PERRLA, EOMI, conjunctiva normal, no discharge. [] 


Neck: Normal range of motion, no tenderness, supple, no stridor. [] 


Cardiovascular:Heart rate regular rhythm, no murmur []


Lungs & Thorax:  Bilateral breath sounds clear to auscultation []


Abdomen: Bowel sounds normal, soft, no tenderness, no masses, no pulsatile 

masses. [] 


Skin: Warm, dry, no erythema, no rash. [] 


Back: No tenderness, no CVA tenderness. [] 


Extremities: Right knee tenderness, no swelling noted, range of motion intact, 

increasing pain with ambulation, no signs of trauma


Neurologic: Alert and oriented X 3, normal motor function, normal sensory 

function, no focal deficits noted. []


Psychologic: Affect normal, judgement normal, mood normal. []





Current Patient Data:


Vital Signs:





                                   Vital Signs








  Date Time  Temp Pulse Resp B/P (MAP) Pulse Ox O2 Delivery O2 Flow Rate FiO2


 


3/18/22 15:12   18  100 Room Air  


 


3/18/22 14:42 98.0 89  171/82 (111)    





 98.0       











EKG:


EKG:


[]





Radiology/Procedures:


Radiology/Procedures:


[]XR KNEE 3 VIEWS_RT





History: Reason: pain / Spl. Instructions:  / History: 





Technique: 2 views right knee





Comparison: September 2, 2021





Findings:


No dislocation. No acute fracture. Advanced right knee degenerative changes most

 prominent within the medial compartment. Basilar calcifications. No significant

 knee joint effusion.





Impression: 


1.  Advanced right knee DJD, similar compared to prior.





Electronically signed by: Vasyl Pizarro DO (3/18/2022 3:30 PM) [id number]





Course & Med Decision Making:


Course & Med Decision Making


Pertinent Labs and Imaging studies reviewed. (See chart for details)





[] 76-year-old female presents after a fall 4 weeks ago.  Patient reports she 

was seen at [facility] at that time for hip and knee pain.  

Patient states she had no fractures at that time.  Patient is reporting she is 

still having increased pain in her right knee.  Tylenol has not been helping 

with discomfort at home.  Patient currently sees Dr. Chery and has not had a 

follow-up appointment with him since incident.  Work-up in ER consisted of x-ray

 of right knee.  Patient given hydrocodone for pain.





Right knee x-ray is unremarkable.  Advised patient that she most likely needs 

further imaging such as an MRI due to pain not improving.  Patient sent home 

with pain medication.  Educated on RICE.  Patient verbalizes understanding to 

discharge instructions and follow-up with Dr. Chery on Monday.





Dragon Disclaimer:


Dragon Disclaimer:


This electronic medical record was generated, in whole or in part, using a voice

 recognition dictation system.





Departure


Departure


Impression:  


   Primary Impression:  


   Right knee pain


   Qualified Codes:  M25.561 - Pain in right knee


Disposition:  01 HOME / SELF CARE / HOMELESS


Condition:  STABLE


Referrals:  


ABDON CHERY MD (PCP)


Patient Instructions:  Knee Pain, Easy-to-Read





Additional Instructions:  


You are seen in the emergency room for knee pain.  X-ray was unremarkable.  

Sending you home with pain medication until you can follow-up.  You will need to

 call Dr. Chery and make a follow-up appointment for Monday for further imaging. 

 Rest, use ice to the area, elevate to help with swelling and pain.  Return to 

emergency room with worsening symptoms or concerns.





EMERGENCY DEPARTMENT GENERAL DISCHARGE INSTRUCTIONS





Thank you for coming to [facility] Emergency Department (ED) 

today and 


trusting us with you care.  We trust that you had a positive experience in our 

Emergency 


Department.  If you wish to speak to the department management, you may call the

 Director at 


[phone].





YOUR FOLLOW UP INSTRUCTIONS ARE AS FOLLOWS:





1.  Do you have a private Doctor?  If you do not have a private doctor, please 

ask for a 


resource list of physicians or clinics that may be able to assist you with 

follow up care.





2.  The Emergency Physicain has interpreted your x-rays.  The X-Ray specialist 

will also 


review them.  If there is a change in the findings, you will be notified in 48 

hours when at 


all possible.





3.  A lab test or culture has been done, your results will be reviewed and you 

will be 


notified if you need a change in treatment.





ADDITIONAL INSTRUCTIONS AND INFORMATION:





1.  Your care today has been supervised by a physician who is specially trained 

in emergency 


care.  Many problems require more than one evaluation for a complete diagnosis 

and 


treatment.  We recommend that you schedule your follow up appointment as 

recommended to 


ensure complete treatment of you illness or injury.  If you are unable to obtain

 follow up 


care and continue to have a problem, or if your condition worsens, we recommend 

that you 


return to the ED.





2.  We are not able to safely determine your condition over the phone nor are we

 able to 


give sound medical advice over the phone.  For these safety reasons, if you call

 for medical 


advice we will ask you to come to the ED for further evaluation.





3.  If you have any questions regarding these discharge instructions please call

 the ED at 


[phone].





SAFETY INFORMATION:





In the interest of safety, wellness, and injury prevention; we encourage you to 

wear your 


sealbelt, if you smoke; quite smoking, and we encourage family to use a 

protective helmet 


for bicycling and other sporting events that present an increased risk for head 

injury.





IF YOUR SYMPTOMS WORSEN OR NEW SYMPTOMS DEVELOP, OR YOU HAVE CONCERNS ABOUT YOUR

 CONDITION; 


OR IF YOUR CONDITION WORSENS WHILE YOU ARE WAITING FOR YOUR FOLLOW UP 

APPOINTMENT; EITHER 


CONTACT YOUR PRIMARY CARE DOCTOR, THE PHYSICIAN WHOSE NAME AND NUMBER YOU WERE 

GIVEN, OR 


RETURN TO THE ED IMMEDIATELY.


Scripts


Hydrocodone Bit/Acetaminophen (HYDROCODONE-APAP 5-325  **) 1 Tab Tablet


1 TAB PO PRN Q6HRS PRN for PAIN for 3 Days, #10 TAB 0 Refills


   Prov: CHERYLE LIVINGSTON         3/18/22 


Hydrocodone Bit/Acetaminophen (HYDROCODONE-APAP 5-325  **) 1 Tab Tablet


1 TAB PO PRN Q6HRS PRN for PAIN for 3 Days, #10 TAB 0 Refills


   Prov: CHERYLE LIVINGSTON APRN         3/18/22











CHERYLE LIVINGSTON               Mar 18, 2022 15:27

## 2025-04-05 NOTE — RAD
Breathalyzer: .364     MR CERVICAL SPINE 

 

HISTORY:PRIOR MRI PMIC 2014....PT C/O NECK PAIN W/BILATERAL HAND 

NUMBNESS...PRIOR NECK FUSIONReason: right sided neck pain with right 

cervical radiculitis / Spl. Instructions: / History: 

 

 

COMPARISON:MRI cervical spine from 10/20/2014 

 

 

Technique: Sagittal T2, sagittal STIR, sagittal T1, and axial gradient 

echo imaging was obtained of the cervical spine. 

 

 

FINDINGS: 

 

There is osseous fusion C4-C5. Vertebral body heights are maintained. No 

significant anterolisthesis or retrolisthesis. There is multilevel 

degenerative disc height loss at most levels of the cervical spine. This 

is most pronounced at the level of C2-C3 where there is near complete loss

of disc height with endplate irregularity and reactive endplate edema. The

cord is normal in caliber with no cord signal abnormality identified. 

 

At C2-3 there is bilateral uncovertebral hypertrophy causing mild to 

moderate bilateral foraminal stenosis. Correlate for C3 radiculopathy 

there is also a disc osteophyte complex which causes mild mass effect upon

the cord. 

 

At C3-C4 there is a disc osteophyte complex causing mild mass effect upon 

the cord. There is mild bilateral foraminal stenosis from uncovertebral 

hypertrophy and facet arthropathy. 

 

At C4-C5 there is bilateral uncovertebral hypertrophy worse on the right 

causing moderate right and mild left foraminal stenosis. Correlate for 

right C5 radiculopathy symptoms. 

 

At C5-C6 there is mild to moderate bilateral foraminal stenosis from 

uncovertebral hypertrophy. The disc osteophyte complex causes mild mass 

effect upon the cord. 

 

At C6-C7 there is moderate bilateral foraminal stenosis from uncovertebral

hypertrophy. Correlate for C7 radiculopathy symptoms. 

 

At C7-T1 there is no spinal stenosis. 

 

Impression: 

- When compared to the prior exam from 10/20/2014, there has been 

significant progression of degenerative disease at the level of C2-C3 with

worsening disc height loss and development of some reactive endplate 

edema. There is also moderate bilateral foraminal stenosis and mild mass 

effect upon the cord at this level.

- Other levels of moderate degenerative disc disease are not significantly

changed and are detailed at each level above.

 

 

 

 

 

Electronically signed by: Jaspreet Adan (Apr 26, 2017 12:46:44)